# Patient Record
Sex: MALE | Race: BLACK OR AFRICAN AMERICAN | NOT HISPANIC OR LATINO | Employment: UNEMPLOYED | URBAN - METROPOLITAN AREA
[De-identification: names, ages, dates, MRNs, and addresses within clinical notes are randomized per-mention and may not be internally consistent; named-entity substitution may affect disease eponyms.]

---

## 2018-11-08 PROBLEM — R53.1 ACUTE LEFT-SIDED WEAKNESS: Status: ACTIVE | Noted: 2018-11-08

## 2018-11-09 ENCOUNTER — HOSPITAL ENCOUNTER (INPATIENT)
Facility: HOSPITAL | Age: 43
LOS: 13 days | Discharge: NURSING, REHAB OR LONG-TERM CARE | DRG: 023 | End: 2018-11-22
Attending: EMERGENCY MEDICINE | Admitting: RADIOLOGY
Payer: MEDICAID

## 2018-11-09 ENCOUNTER — RESEARCH ENCOUNTER (OUTPATIENT)
Dept: NEUROLOGY | Facility: CLINIC | Age: 43
End: 2018-11-09

## 2018-11-09 DIAGNOSIS — I63.9 STROKE: ICD-10-CM

## 2018-11-09 DIAGNOSIS — Z92.82 S/P ADMN TPA IN DIFF FAC W/N LAST 24 HR BEF ADM TO CRNT FAC: ICD-10-CM

## 2018-11-09 DIAGNOSIS — Z72.0 TOBACCO ABUSE: ICD-10-CM

## 2018-11-09 DIAGNOSIS — I10 ESSENTIAL HYPERTENSION: ICD-10-CM

## 2018-11-09 DIAGNOSIS — I63.411 EMBOLIC STROKE INVOLVING RIGHT MIDDLE CEREBRAL ARTERY: Primary | ICD-10-CM

## 2018-11-09 DIAGNOSIS — R53.1 ACUTE LEFT-SIDED WEAKNESS: ICD-10-CM
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
## 2018-11-09 DIAGNOSIS — G93.6 CYTOTOXIC CEREBRAL EDEMA: ICD-10-CM

## 2018-11-09 DIAGNOSIS — I63.9 CVA (CEREBRAL VASCULAR ACCIDENT): ICD-10-CM

## 2018-11-09 PROBLEM — F17.200 SMOKER: Status: ACTIVE | Noted: 2018-11-09

## 2018-11-09 PROBLEM — I63.412 EMBOLIC STROKE INVOLVING LEFT MIDDLE CEREBRAL ARTERY: Status: ACTIVE | Noted: 2018-11-09

## 2018-11-09 LAB
ALBUMIN SERPL BCP-MCNC: 3.8 G/DL
ALP SERPL-CCNC: 88 U/L
ALT SERPL W/O P-5'-P-CCNC: 20 U/L
AMPHET+METHAMPHET UR QL: NORMAL
ANION GAP SERPL CALC-SCNC: 10 MMOL/L
ANION GAP SERPL CALC-SCNC: 10 MMOL/L
AST SERPL-CCNC: 24 U/L
BARBITURATES UR QL SCN>200 NG/ML: NEGATIVE
BASOPHILS # BLD AUTO: 0.03 K/UL
BASOPHILS NFR BLD: 0.3 %
BENZODIAZ UR QL SCN>200 NG/ML: NORMAL
BILIRUB SERPL-MCNC: 0.8 MG/DL
BUN SERPL-MCNC: 18 MG/DL
BUN SERPL-MCNC: 18 MG/DL
BZE UR QL SCN: NEGATIVE
CALCIUM SERPL-MCNC: 9.2 MG/DL
CALCIUM SERPL-MCNC: 9.4 MG/DL
CANNABINOIDS UR QL SCN: NORMAL
CHLORIDE SERPL-SCNC: 103 MMOL/L
CHLORIDE SERPL-SCNC: 108 MMOL/L
CHOLEST SERPL-MCNC: 201 MG/DL
CHOLEST/HDLC SERPL: 5.7 {RATIO}
CO2 SERPL-SCNC: 23 MMOL/L
CO2 SERPL-SCNC: 24 MMOL/L
CREAT SERPL-MCNC: 0.9 MG/DL
CREAT SERPL-MCNC: 1.2 MG/DL
CREAT UR-MCNC: 215 MG/DL
DIFFERENTIAL METHOD: ABNORMAL
EOSINOPHIL # BLD AUTO: 0 K/UL
EOSINOPHIL NFR BLD: 0.1 %
ERYTHROCYTE [DISTWIDTH] IN BLOOD BY AUTOMATED COUNT: 17.4 %
ERYTHROCYTE [SEDIMENTATION RATE] IN BLOOD BY WESTERGREN METHOD: 30 MM/HR
EST. GFR  (AFRICAN AMERICAN): >60 ML/MIN/1.73 M^2
EST. GFR  (AFRICAN AMERICAN): >60 ML/MIN/1.73 M^2
EST. GFR  (NON AFRICAN AMERICAN): >60 ML/MIN/1.73 M^2
EST. GFR  (NON AFRICAN AMERICAN): >60 ML/MIN/1.73 M^2
ESTIMATED AVG GLUCOSE: 123 MG/DL
ETHANOL UR-MCNC: <10 MG/DL
GLUCOSE SERPL-MCNC: 118 MG/DL
GLUCOSE SERPL-MCNC: 99 MG/DL
HBA1C MFR BLD HPLC: 5.9 %
HCT VFR BLD AUTO: 44.8 %
HDLC SERPL-MCNC: 35 MG/DL
HDLC SERPL: 17.4 %
HGB BLD-MCNC: 14.2 G/DL
IMM GRANULOCYTES # BLD AUTO: 0.02 K/UL
IMM GRANULOCYTES NFR BLD AUTO: 0.2 %
INR PPP: 1
LDLC SERPL CALC-MCNC: 141.4 MG/DL
LYMPHOCYTES # BLD AUTO: 2 K/UL
LYMPHOCYTES NFR BLD: 22.5 %
MCH RBC QN AUTO: 25.4 PG
MCHC RBC AUTO-ENTMCNC: 31.7 G/DL
MCV RBC AUTO: 80 FL
METHADONE UR QL SCN>300 NG/ML: NEGATIVE
MONOCYTES # BLD AUTO: 0.4 K/UL
MONOCYTES NFR BLD: 5 %
NEUTROPHILS # BLD AUTO: 6.4 K/UL
NEUTROPHILS NFR BLD: 71.9 %
NONHDLC SERPL-MCNC: 166 MG/DL
NRBC BLD-RTO: 0 /100 WBC
OPIATES UR QL SCN: NORMAL
PCP UR QL SCN>25 NG/ML: NEGATIVE
PLATELET # BLD AUTO: 242 K/UL
PMV BLD AUTO: 13 FL
POCT GLUCOSE: 113 MG/DL (ref 70–110)
POCT GLUCOSE: 118 MG/DL (ref 70–110)
POCT GLUCOSE: 120 MG/DL (ref 70–110)
POCT GLUCOSE: 130 MG/DL (ref 70–110)
POTASSIUM SERPL-SCNC: 3.5 MMOL/L
POTASSIUM SERPL-SCNC: 4.7 MMOL/L
PROT SERPL-MCNC: 7.5 G/DL
PROTHROMBIN TIME: 10.6 SEC
RBC # BLD AUTO: 5.6 M/UL
SODIUM SERPL-SCNC: 137 MMOL/L
SODIUM SERPL-SCNC: 141 MMOL/L
TOXICOLOGY INFORMATION: NORMAL
TRIGL SERPL-MCNC: 123 MG/DL
TSH SERPL DL<=0.005 MIU/L-ACNC: 1.56 UIU/ML
WBC # BLD AUTO: 8.88 K/UL

## 2018-11-09 PROCEDURE — B3161ZZ FLUOROSCOPY OF RIGHT INTERNAL CAROTID ARTERY USING LOW OSMOLAR CONTRAST: ICD-10-PCS | Performed by: RADIOLOGY

## 2018-11-09 PROCEDURE — G8996 SWALLOW CURRENT STATUS: HCPCS | Mod: CK

## 2018-11-09 PROCEDURE — 96376 TX/PRO/DX INJ SAME DRUG ADON: CPT

## 2018-11-09 PROCEDURE — 93010 ELECTROCARDIOGRAM REPORT: CPT | Mod: ,,, | Performed by: INTERNAL MEDICINE

## 2018-11-09 PROCEDURE — 82962 GLUCOSE BLOOD TEST: CPT

## 2018-11-09 PROCEDURE — G8997 SWALLOW GOAL STATUS: HCPCS | Mod: CI

## 2018-11-09 PROCEDURE — 03CG3ZZ EXTIRPATION OF MATTER FROM INTRACRANIAL ARTERY, PERCUTANEOUS APPROACH: ICD-10-PCS | Performed by: RADIOLOGY

## 2018-11-09 PROCEDURE — 99233 SBSQ HOSP IP/OBS HIGH 50: CPT | Mod: ,,, | Performed by: PSYCHIATRY & NEUROLOGY

## 2018-11-09 PROCEDURE — 85610 PROTHROMBIN TIME: CPT

## 2018-11-09 PROCEDURE — 94761 N-INVAS EAR/PLS OXIMETRY MLT: CPT

## 2018-11-09 PROCEDURE — 96374 THER/PROPH/DIAG INJ IV PUSH: CPT

## 2018-11-09 PROCEDURE — 99291 CRITICAL CARE FIRST HOUR: CPT | Mod: ,,, | Performed by: PHYSICIAN ASSISTANT

## 2018-11-09 PROCEDURE — 80053 COMPREHEN METABOLIC PANEL: CPT

## 2018-11-09 PROCEDURE — 25500020 PHARM REV CODE 255: Performed by: EMERGENCY MEDICINE

## 2018-11-09 PROCEDURE — 63600175 PHARM REV CODE 636 W HCPCS: Performed by: STUDENT IN AN ORGANIZED HEALTH CARE EDUCATION/TRAINING PROGRAM

## 2018-11-09 PROCEDURE — 80048 BASIC METABOLIC PNL TOTAL CA: CPT

## 2018-11-09 PROCEDURE — 63600175 PHARM REV CODE 636 W HCPCS: Performed by: RADIOLOGY

## 2018-11-09 PROCEDURE — 83036 HEMOGLOBIN GLYCOSYLATED A1C: CPT

## 2018-11-09 PROCEDURE — 92610 EVALUATE SWALLOWING FUNCTION: CPT

## 2018-11-09 PROCEDURE — 25000003 PHARM REV CODE 250: Performed by: STUDENT IN AN ORGANIZED HEALTH CARE EDUCATION/TRAINING PROGRAM

## 2018-11-09 PROCEDURE — 80061 LIPID PANEL: CPT

## 2018-11-09 PROCEDURE — 80307 DRUG TEST PRSMV CHEM ANLYZR: CPT

## 2018-11-09 PROCEDURE — 85652 RBC SED RATE AUTOMATED: CPT

## 2018-11-09 PROCEDURE — 25000003 PHARM REV CODE 250: Performed by: NURSE PRACTITIONER

## 2018-11-09 PROCEDURE — 93005 ELECTROCARDIOGRAM TRACING: CPT

## 2018-11-09 PROCEDURE — 99285 EMERGENCY DEPT VISIT HI MDM: CPT | Mod: 25

## 2018-11-09 PROCEDURE — 99231 SBSQ HOSP IP/OBS SF/LOW 25: CPT | Mod: ,,, | Performed by: SURGERY

## 2018-11-09 PROCEDURE — 85025 COMPLETE CBC W/AUTO DIFF WBC: CPT

## 2018-11-09 PROCEDURE — 20000000 HC ICU ROOM

## 2018-11-09 PROCEDURE — B3131ZZ FLUOROSCOPY OF RIGHT COMMON CAROTID ARTERY USING LOW OSMOLAR CONTRAST: ICD-10-PCS | Performed by: RADIOLOGY

## 2018-11-09 PROCEDURE — 84443 ASSAY THYROID STIM HORMONE: CPT

## 2018-11-09 PROCEDURE — 36620 INSERTION CATHETER ARTERY: CPT | Mod: ,,, | Performed by: PSYCHIATRY & NEUROLOGY

## 2018-11-09 PROCEDURE — 86038 ANTINUCLEAR ANTIBODIES: CPT

## 2018-11-09 PROCEDURE — 99223 1ST HOSP IP/OBS HIGH 75: CPT | Mod: 25,,, | Performed by: PSYCHIATRY & NEUROLOGY

## 2018-11-09 PROCEDURE — 25000003 PHARM REV CODE 250

## 2018-11-09 RX ORDER — GLUCAGON 1 MG
1 KIT INJECTION
Status: DISCONTINUED | OUTPATIENT
Start: 2018-11-09 | End: 2018-11-22 | Stop reason: HOSPADM

## 2018-11-09 RX ORDER — LABETALOL HYDROCHLORIDE 5 MG/ML
10 INJECTION, SOLUTION INTRAVENOUS ONCE
Status: DISCONTINUED | OUTPATIENT
Start: 2018-11-09 | End: 2018-11-09

## 2018-11-09 RX ORDER — SODIUM,POTASSIUM PHOSPHATES 280-250MG
2 POWDER IN PACKET (EA) ORAL
Status: DISCONTINUED | OUTPATIENT
Start: 2018-11-09 | End: 2018-11-16

## 2018-11-09 RX ORDER — SODIUM CHLORIDE 0.9 % (FLUSH) 0.9 %
5 SYRINGE (ML) INJECTION
Status: DISCONTINUED | OUTPATIENT
Start: 2018-11-09 | End: 2018-11-22 | Stop reason: HOSPADM

## 2018-11-09 RX ORDER — MIDAZOLAM HYDROCHLORIDE 1 MG/ML
INJECTION INTRAMUSCULAR; INTRAVENOUS CODE/TRAUMA/SEDATION MEDICATION
Status: COMPLETED | OUTPATIENT
Start: 2018-11-09 | End: 2018-11-09

## 2018-11-09 RX ORDER — AMLODIPINE BESYLATE 10 MG/1
10 TABLET ORAL DAILY
Status: DISCONTINUED | OUTPATIENT
Start: 2018-11-09 | End: 2018-11-22 | Stop reason: HOSPADM

## 2018-11-09 RX ORDER — POTASSIUM CHLORIDE 20 MEQ/15ML
40 SOLUTION ORAL
Status: DISCONTINUED | OUTPATIENT
Start: 2018-11-09 | End: 2018-11-16

## 2018-11-09 RX ORDER — ATORVASTATIN CALCIUM 20 MG/1
40 TABLET, FILM COATED ORAL DAILY
Status: DISCONTINUED | OUTPATIENT
Start: 2018-11-09 | End: 2018-11-16

## 2018-11-09 RX ORDER — MIDAZOLAM HYDROCHLORIDE 1 MG/ML
INJECTION INTRAMUSCULAR; INTRAVENOUS
Status: DISPENSED
Start: 2018-11-09 | End: 2018-11-10

## 2018-11-09 RX ORDER — LANOLIN ALCOHOL/MO/W.PET/CERES
800 CREAM (GRAM) TOPICAL
Status: DISCONTINUED | OUTPATIENT
Start: 2018-11-09 | End: 2018-11-16

## 2018-11-09 RX ORDER — LIDOCAINE HCL/PF 100 MG/5ML
SYRINGE (ML) INTRAVENOUS
Status: DISCONTINUED
Start: 2018-11-09 | End: 2018-11-09 | Stop reason: WASHOUT

## 2018-11-09 RX ORDER — NICARDIPINE HYDROCHLORIDE 0.2 MG/ML
1 INJECTION INTRAVENOUS CONTINUOUS
Status: DISCONTINUED | OUTPATIENT
Start: 2018-11-09 | End: 2018-11-10

## 2018-11-09 RX ORDER — LIDOCAINE HYDROCHLORIDE 10 MG/ML
1 INJECTION, SOLUTION EPIDURAL; INFILTRATION; INTRACAUDAL; PERINEURAL
Status: DISCONTINUED | OUTPATIENT
Start: 2018-11-09 | End: 2018-11-12

## 2018-11-09 RX ORDER — LIDOCAINE HYDROCHLORIDE 10 MG/ML
INJECTION INFILTRATION; PERINEURAL
Status: COMPLETED
Start: 2018-11-09 | End: 2018-11-09

## 2018-11-09 RX ORDER — HYDRALAZINE HYDROCHLORIDE 20 MG/ML
10 INJECTION INTRAMUSCULAR; INTRAVENOUS EVERY 4 HOURS PRN
Status: DISCONTINUED | OUTPATIENT
Start: 2018-11-09 | End: 2018-11-22 | Stop reason: HOSPADM

## 2018-11-09 RX ORDER — HYDRALAZINE HYDROCHLORIDE 25 MG/1
25 TABLET, FILM COATED ORAL ONCE
Status: COMPLETED | OUTPATIENT
Start: 2018-11-09 | End: 2018-11-09

## 2018-11-09 RX ORDER — INSULIN ASPART 100 [IU]/ML
1-10 INJECTION, SOLUTION INTRAVENOUS; SUBCUTANEOUS EVERY 6 HOURS PRN
Status: DISCONTINUED | OUTPATIENT
Start: 2018-11-09 | End: 2018-11-12

## 2018-11-09 RX ORDER — AMLODIPINE BESYLATE 10 MG/1
10 TABLET ORAL DAILY
COMMUNITY

## 2018-11-09 RX ORDER — HYDRALAZINE HYDROCHLORIDE 50 MG/1
50 TABLET, FILM COATED ORAL EVERY 8 HOURS
Status: DISCONTINUED | OUTPATIENT
Start: 2018-11-09 | End: 2018-11-13

## 2018-11-09 RX ORDER — POTASSIUM CHLORIDE 20 MEQ/15ML
60 SOLUTION ORAL
Status: DISCONTINUED | OUTPATIENT
Start: 2018-11-09 | End: 2018-11-16

## 2018-11-09 RX ORDER — HYDRALAZINE HYDROCHLORIDE 25 MG/1
25 TABLET, FILM COATED ORAL EVERY 8 HOURS
Status: DISCONTINUED | OUTPATIENT
Start: 2018-11-09 | End: 2018-11-09

## 2018-11-09 RX ORDER — LABETALOL HYDROCHLORIDE 5 MG/ML
INJECTION, SOLUTION INTRAVENOUS
Status: COMPLETED
Start: 2018-11-09 | End: 2018-11-09

## 2018-11-09 RX ADMIN — NICARDIPINE HYDROCHLORIDE 10 MG/HR: 0.2 INJECTION, SOLUTION INTRAVENOUS at 02:11

## 2018-11-09 RX ADMIN — HYDRALAZINE HYDROCHLORIDE 25 MG: 25 TABLET, FILM COATED ORAL at 11:11

## 2018-11-09 RX ADMIN — NICARDIPINE HYDROCHLORIDE 15 MG/HR: 0.2 INJECTION, SOLUTION INTRAVENOUS at 09:11

## 2018-11-09 RX ADMIN — HYDRALAZINE HYDROCHLORIDE 25 MG: 25 TABLET, FILM COATED ORAL at 05:11

## 2018-11-09 RX ADMIN — LABETALOL HYDROCHLORIDE 10 MG: 5 INJECTION INTRAVENOUS at 07:11

## 2018-11-09 RX ADMIN — MIDAZOLAM HYDROCHLORIDE 1 MG: 1 INJECTION, SOLUTION INTRAMUSCULAR; INTRAVENOUS at 05:11

## 2018-11-09 RX ADMIN — HYDRALAZINE HYDROCHLORIDE 10 MG: 20 INJECTION INTRAMUSCULAR; INTRAVENOUS at 09:11

## 2018-11-09 RX ADMIN — ATORVASTATIN CALCIUM 40 MG: 20 TABLET, FILM COATED ORAL at 11:11

## 2018-11-09 RX ADMIN — NICARDIPINE HYDROCHLORIDE 10 MG/HR: 0.2 INJECTION, SOLUTION INTRAVENOUS at 06:11

## 2018-11-09 RX ADMIN — AMLODIPINE BESYLATE 10 MG: 10 TABLET ORAL at 11:11

## 2018-11-09 RX ADMIN — MIDAZOLAM HYDROCHLORIDE 0.5 MG: 1 INJECTION, SOLUTION INTRAMUSCULAR; INTRAVENOUS at 06:11

## 2018-11-09 RX ADMIN — IOHEXOL 100 ML: 350 INJECTION, SOLUTION INTRAVENOUS at 03:11

## 2018-11-09 RX ADMIN — IOHEXOL 45 ML: 300 INJECTION, SOLUTION INTRAVENOUS at 06:11

## 2018-11-09 RX ADMIN — LIDOCAINE HYDROCHLORIDE: 10 INJECTION, SOLUTION INFILTRATION; PERINEURAL at 10:11

## 2018-11-09 NOTE — PT/OT/SLP EVAL
"Speech Language Pathology Evaluation  Bedside Swallow    Patient Name:  Tyree Pablo   MRN:  [id number]  Admitting Diagnosis: <principal problem not specified>    Recommendations:                 General Recommendations:  Speech language evaluation  Diet recommendations:  Puree, Thin   Aspiration Precautions: Check for pocketing/oral residue, Meds crushed in puree, No straws and Standard aspiration precautions   General Precautions: Standard, aspiration  Communication strategies:  none    History:     Past Medical History:   Diagnosis Date    Acute left-sided weakness 11/8/2018    Hypertension        History reviewed. No pertinent surgical history.    Social History: Patient lives with family.        Prior diet: Regular        Subjective     "That tastes good' per pt  Patient goals did not state    Pain/Comfort:  · Pain Rating 1: 0/10  · Pain Rating Post-Intervention 1: 0/10    Objective:     Oral Musculature Evaluation  · Oral Musculature: facial asymmetry present, left weakness  · Dentition: present and adequate  · Mucosal Quality: good  · Oral Labial Strength and Mobility: impaired retraction, impaired seal  · Lingual Strength and Mobility: impaired strength  · Velar Elevation: WFL  · Buccal Strength and Mobility: impaired  · Volitional Cough: strong  · Volitional Swallow: delayed  · Voice Prior to PO Intake: wfl    Bedside Swallow Eval:   Consistencies Assessed:  · Thin liquids 2 teaspoons then 6 sips via cup  · Puree 2 teaspoons  · Solids 1/2 cracker     Oral Phase:   · Anterior loss  · Impaired rotational chew  · Oral residue    Pharyngeal Phase:   · no overt clinical signs/symptoms of aspiration  · no overt clinical signs/symptoms of pharyngeal dysphagia    Compensatory Strategies  · None    Treatment:     Assessment:     Tyree Pablo is a 43 y.o. male with an SLP diagnosis of .  He presents with oral dysphagia  Goals:   Multidisciplinary Problems     SLP Goals        Problem: SLP Goal    Goal Priority " Disciplines Outcome   SLP Goal     SLP    Description:  Goals due 11/16  1.  Tolerate trials of [facility] soft diet with thin liquids with no s/s of aspiration  2.  Participate in speech language evaluation                    Plan:     · Patient to be seen:  5 x/week   · Plan of Care expires:  12/07/18  · Plan of Care reviewed with:  patient   · SLP Follow-Up:  Yes       Discharge recommendations:  rehabilitation facility       Time Tracking:     SLP Treatment Date:   11/09/18  Speech Start Time:  1135  Speech Stop Time:  1145     Speech Total Time (min):  10 min    Billable Minutes: Eval Swallow and Oral Function 10    Estefanía Yanez MA, CCC-SLP  11/09/2018

## 2018-11-09 NOTE — PROCEDURES
Radiology Post-Procedure Note    Pre Op Diagnosis: right MCA stroke    Post Op Diagnosis: same, S/P MCA thrombectomy    Procedure: Cerebral angiogram    Procedure performed by: Marek Solis MD    Written Informed Consent Obtained: Yes    Specimen Removed: YES thrombus from the right M1 segment    Estimated Blood Loss: less than 50     Procedure report:     A 8F sheath was placed into the right femoral artery and a 5F Desmond catheter was advanced into the aortic arch.  The right common and internal carotid arteries were subselected and angiography of the brain was performed.  Using suction aspiration, a thrombus was removed from the right MCA with TICI 2B reperfusion.      Time of groin access was 6:06am.]  !st pass TICI 2B was at 6:13am.      A right femoral artery angiogram was performed, the sheath removed and hemostasis achieved using Angioseal.  No hematoma was present at the time of hemostasis.    The patient tolerated the procedure well.     Marek Solis MD  Attending Radiologist  Interventional Neuroradiology

## 2018-11-09 NOTE — ASSESSMENT & PLAN NOTE
Q1H Neuro checks   Will follow repeat imaging for signs of edema   Consider Neurosurgery Consult for hemicrani

## 2018-11-09 NOTE — H&P
Inpatient Radiology Pre-procedure Note    History of Present Illness:  Tyree Pablo is a 43 y.o. male with right M1 occlusion who presents for cerebral angiogram with possible thrombectomy.    Admission H&P reviewed.  Past Medical History:   Diagnosis Date    Acute left-sided weakness 11/8/2018    Hypertension      History reviewed. No pertinent surgical history.    Review of Systems:   As documented in primary team H&P    Home Meds:   Prior to Admission medications    Medication Sig Start Date End Date Taking? Authorizing Provider   amLODIPine (NORVASC) 10 MG tablet Take 10 mg by mouth once daily.   Yes Historical Provider, MD     Scheduled Meds:   Continuous Infusions:   PRN Meds:  Anticoagulants/Antiplatelets: tpa    Allergies: Review of patient's allergies indicates:  No Known Allergies  Sedation Hx: have not been any systemic reactions    Labs:  Recent Labs   Lab 11/09/18 0353   INR 1.0       Recent Labs   Lab 11/09/18 0353   WBC 8.88   HGB 14.2   HCT 44.8   MCV 80*         Recent Labs   Lab 11/09/18 0353   GLU 99      K 4.7      CO2 24   BUN 18   CREATININE 1.2   CALCIUM 9.2   ALT 20   AST 24   ALBUMIN 3.8   BILITOT 0.8         Vitals:  Temp: 98.2 °F (36.8 °C) (11/09/18 0312)  Pulse: 84 (11/09/18 0534)  Resp: 18 (11/09/18 0504)  BP: 119/80 (11/09/18 0518)  SpO2: 100 % (11/09/18 0534)     Physical Exam:  ASA: 3  Mallampati: 2    Mental Status: alert   HEENT: normocephalic, atraumatic  Chest: unlabored breathing  Heart: regular heart rate  Abdomen: nondistended  Extremity: left hemiplegia    Plan: cerebral angiogram with thrombectomy  Sedation Plan: Moderate    Ashley Kilgore MD  Resident  Department of Radiology  Pager: [phone]
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
## 2018-11-09 NOTE — ED TRIAGE NOTES
Pt presents to ED as transfer from [facility] for CVA. Pt presented with left sided upper and lower extremity paralysis and left facial droop which started at 2030 on 11/8/18. Pt reports having similar symptoms a few days ago but reports that symptoms resolved after 30 minutes. Pt currently AAOx4, following commands, but still exhibits left sided paralysis and left facial droop. Pt's only known history is hypertension.       TPA Administration   Administered by [facility]   Bolus dose of 9mg given at 2234.   Infusion of 81mg started at 2235.   Infusion completed at 2336.   Last Seen Normal: 11/8/18 at 2030   Symptoms Onset 11/8/18 at 2030

## 2018-11-09 NOTE — ASSESSMENT & PLAN NOTE
-Area of cytotoxic cerebral edema identified when reviewing brain imaging in the territory of the right middle cerebral artery. There is no mass effect associated with it. We will continue to monitor the patients clinical exam for any worsening of symptoms which may indicate expansion of the stroke or the area of the edema resulting in the clinical change. The pattern is suggestive of embolic etiology.

## 2018-11-09 NOTE — PROCEDURES
"Tyree Pablo is a 43 y.o. male patient.    Temp: 97.4 °F (36.3 °C) (18 1105)  Pulse: 87 (18 1320)  Resp: 18 (18 1320)  BP: 139/65 (18 1320)  SpO2: 98 % (18 1320)       Arterial Line  Date/Time: 2018 3:59 PM  Location procedure was performed: [facility] NEURO CRITICAL CARE  Performed by: Brenda Adler MD  Authorized by: Brenda Adler MD   Assisting provider: Jeremy Parsons MD  Consent Done: Yes  Consent: Verbal consent obtained. Written consent obtained.  Risks and benefits: risks, benefits and alternatives were discussed  Consent given by: spouse (Verbal from patient, spouse signed due to patient's mechanical limitations in upper extremities)  Patient understanding: patient states understanding of the procedure being performed  Patient consent: the patient's understanding of the procedure matches consent given  Procedure consent: procedure consent matches procedure scheduled  Relevant documents: relevant documents present and verified  Site marked: the operative site was marked  Patient identity confirmed: , name and verbally with patient  Time out: Immediately prior to procedure a "time out" was called to verify the correct patient, procedure, equipment, support staff and site/side marked as required.  Preparation: Patient was prepped and draped in the usual sterile fashion.  Indications: hemodynamic monitoring  Location: right radial  Anesthesia: local infiltration    Anesthesia:  Local Anesthetic: lidocaine 1% without epinephrine  Anesthetic total: 1 mL  Patient sedated: no  Description of findings: Bright red pulsatile blood   Needle gauge: 18  Number of attempts: 1  Complications: No  Estimated blood loss (mL): 3  Specimens: No  Implants: No  Post-procedure: dressing applied  Post-procedure CMS: normal        Brenda Adler  2018    "

## 2018-11-09 NOTE — PROGRESS NOTES
Patient arrived to [facility] from OSH  by EMS    Type of stroke/diagnosis:  R MCA    TPA start and end time (if applicable) start: 2234 end: 2336    Thrombectomy start and end time (if applicable) start: 0606 end: 0622     Current symptoms: L sided weakness, dysarthria    Skin assessment done: Y  Wounds noted: extravasation in L AC; Swelling in R shin    NCC notified: Wong Bowden
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
## 2018-11-09 NOTE — SUBJECTIVE & OBJECTIVE
Past Medical History:   Diagnosis Date    Acute left-sided weakness 11/8/2018    Hypertension      History reviewed. No pertinent surgical history.   No current facility-administered medications on file prior to encounter.      Current Outpatient Medications on File Prior to Encounter   Medication Sig Dispense Refill    amLODIPine (NORVASC) 10 MG tablet Take 10 mg by mouth once daily.        Allergies: Patient has no known allergies.    Family History   Problem Relation Age of Onset    Diabetes Mother     Stroke Father      Social History     Tobacco Use    Smoking status: Current Every Day Smoker     Packs/day: 1.00     Years: 5.00     Pack years: 5.00     Types: Cigarettes    Smokeless tobacco: Never Used   Substance Use Topics    Alcohol use: Yes     Comment: Occasionally    Drug use: Yes     Types: Marijuana     Review of Systems   Constitutional: Negative for chills.   Eyes: Positive for photophobia. Negative for visual disturbance.   Respiratory: Negative for apnea and shortness of breath.    Cardiovascular: Negative for chest pain and leg swelling.   Gastrointestinal: Negative for abdominal distention, abdominal pain, nausea and vomiting.   Endocrine: Negative for cold intolerance.   Neurological: Negative for dizziness, seizures and headaches.     Objective:     Vitals:    Temp: 98.8 °F (37.1 °C)  Pulse: 84  BP: (!) 168/105  MAP (mmHg): 95  Resp: 17  SpO2: 100 %  O2 Device (Oxygen Therapy): nasal cannula    Temp  Min: 98.2 °F (36.8 °C)  Max: 98.8 °F (37.1 °C)  Pulse  Min: 80  Max: 117  BP  Min: 119/80  Max: 168/105  MAP (mmHg)  Min: 95  Max: 95  Resp  Min: 16  Max: 20  SpO2  Min: 97 %  Max: 100 %    No intake/output data recorded.           Physical Exam   Constitutional: He appears well-developed.   Neck: Normal range of motion.   Cardiovascular: Normal rate, regular rhythm and normal heart sounds.   Pulmonary/Chest: Effort normal and breath sounds normal.   Abdominal: Soft. Bowel sounds are  normal. He exhibits no distension and no mass. There is no rebound and no guarding.   Neurological:   E4 V5 M 6  GCS 15  NIH 15  Awaken to voice   Right Gaze Deviation, PERRLA  Left facial weakness, dysarthria    Left UE/ LE flaccid   Decreased sensation LUE/LLE      Psychiatric: He has a normal mood and affect. His speech is slurred. He is slowed.     I have perersonally reviewed pertinent medications, lines/drains/airways, imaging, cardiology results, laboratory results,

## 2018-11-09 NOTE — SUBJECTIVE & OBJECTIVE
Interval History: s/p TPA and angiogram with thrombectomy of right M1 branch of MCA. He continues to have no sensation or motor function of LUE and LLE, with left hemineglect.   States he is in no pain and have no current complaints.     Medications:  Continuous Infusions:   nicardipine 10 mg/hr (11/09/18 1627)     Scheduled Meds:   amLODIPine  10 mg Oral Daily    atorvastatin  40 mg Oral Daily    hydrALAZINE  25 mg Oral Once    hydrALAZINE  50 mg Oral Q8H     PRN Meds:dextrose 50%, glucagon (human recombinant), hydrALAZINE, insulin aspart U-100, lidocaine (PF) 10 mg/ml (1%), magnesium oxide, magnesium oxide, potassium chloride 10%, potassium chloride 10%, potassium chloride 10%, potassium, sodium phosphates, potassium, sodium phosphates, potassium, sodium phosphates, sodium chloride 0.9%     Review of patient's allergies indicates:  No Known Allergies  Objective:     Vital Signs (Most Recent):  Temp: 97.8 °F (36.6 °C) (11/09/18 1505)  Pulse: 91 (11/09/18 1620)  Resp: 18 (11/09/18 1620)  BP: 127/63 (11/09/18 1620)  SpO2: 100 % (11/09/18 1620) Vital Signs (24h Range):  Temp:  [97.4 °F (36.3 °C)-98.8 °F (37.1 °C)] 97.8 °F (36.6 °C)  Pulse:  [] 91  Resp:  [12-28] 18  SpO2:  [94 %-100 %] 100 %  BP: (110-186)/() 127/63  Arterial Line BP: (117-153)/(63-69) 127/63        There is no height or weight on file to calculate BMI.    Intake/Output - Last 3 Shifts       11/07 0700 - 11/08 0659 11/08 0700 - 11/09 0659 11/09 0700 - 11/10 0659    I.V.   520.8    Total Intake   520.8    Net   +520.8                 Physical Exam   Constitutional: He is oriented to person, place, and time. He appears well-developed and well-nourished. No distress.   HENT:   Head: Normocephalic and atraumatic.   Eyes: Pupils are equal, round, and reactive to light.   Left hemineglect   Neck: Normal range of motion. Neck supple.   Cardiovascular: Normal rate and regular rhythm.   Pulmonary/Chest: Effort normal. No respiratory distress.    Abdominal: Soft. He exhibits no distension and no mass. There is no tenderness. No hernia.   Musculoskeletal: Normal range of motion. He exhibits no edema, tenderness or deformity.   5/5 strength RUE and RLE. Normal sensation. Soft tissue swelling on anterior right calf which patient reports as chronic from previous leg surgery.   0/5 strength LLE and LUE. No reported sensation.     No bruising or skin changes. No obvious deformities   Neurological: He is alert and oriented to person, place, and time.   Skin: Skin is warm and dry. He is not diaphoretic.       Significant Labs:  CBC:   Recent Labs   Lab 11/09/18 0353   WBC 8.88   RBC 5.60   HGB 14.2   HCT 44.8      MCV 80*   MCH 25.4*   MCHC 31.7*     BMP:   Recent Labs   Lab 11/09/18  1545   *      K 3.5      CO2 23   BUN 18   CREATININE 0.9   CALCIUM 9.4     CMP:   Recent Labs   Lab 11/09/18 0353 11/09/18  1545   GLU 99 118*   CALCIUM 9.2 9.4   ALBUMIN 3.8  --    PROT 7.5  --     141   K 4.7 3.5   CO2 24 23    108   BUN 18 18   CREATININE 1.2 0.9   ALKPHOS 88  --    ALT 20  --    AST 24  --    BILITOT 0.8  --      LFTs:   Recent Labs   Lab 11/09/18 0353   ALT 20   AST 24   ALKPHOS 88   BILITOT 0.8   PROT 7.5   ALBUMIN 3.8       Significant Diagnostics:  Review imaging from the past 24 hours

## 2018-11-09 NOTE — PLAN OF CARE
Problem: SLP Goal  Goal: SLP Goal  Pureed diet with thin liquids recommended.  Crush meds in puree bolus.    Estefanía Yanez MA/[facility]SLP  Speech Language Pathologist  Pager [phone]  11/9/2018

## 2018-11-09 NOTE — ASSESSMENT & PLAN NOTE
S/p tPA given at 2234 - 1630 MRI order   CTA head reveals distal right M1 occlusion   Consult Vascular Neurology  IR following plans for Thrombectomy   BP <140   Q1H Neuro Monitoring   Repeat Imaging to follow   PT/OT/SLP

## 2018-11-09 NOTE — SUBJECTIVE & OBJECTIVE
Past Medical History:   Diagnosis Date    Acute left-sided weakness 11/8/2018    Hypertension      History reviewed. No pertinent surgical history.  Family History   Problem Relation Age of Onset    Diabetes Mother     Stroke Father      Social History     Tobacco Use    Smoking status: Current Every Day Smoker     Packs/day: 1.00     Years: 5.00     Pack years: 5.00     Types: Cigarettes    Smokeless tobacco: Never Used   Substance Use Topics    Alcohol use: Yes     Comment: Occasionally    Drug use: Yes     Types: Marijuana     Review of patient's allergies indicates:  No Known Allergies    Medications: I have reviewed the current medication administration record.    No current facility-administered medications for this encounter.     Current Outpatient Medications:     amLODIPine (NORVASC) 10 MG tablet, Take 10 mg by mouth once daily., Disp: , Rfl:       Review of Systems   Constitutional: Negative for chills and fever.   HENT: Negative for drooling and trouble swallowing.    Eyes: Positive for visual disturbance. Negative for discharge and redness.   Respiratory: Negative for cough and shortness of breath.    Cardiovascular: Negative for chest pain and leg swelling.   Gastrointestinal: Negative for abdominal pain, diarrhea, nausea and vomiting.   Endocrine: Negative for cold intolerance and heat intolerance.   Genitourinary: Negative for hematuria.   Musculoskeletal: Negative for neck pain and neck stiffness.   Skin: Negative for rash and wound.   Allergic/Immunologic: Negative for environmental allergies and food allergies.   Neurological: Positive for dizziness, facial asymmetry, speech difficulty, weakness, light-headedness, numbness and headaches.   Psychiatric/Behavioral: Negative for agitation and confusion.     Objective:     Vital Signs (Most Recent):  Temp: 98.2 °F (36.8 °C) (11/09/18 0312)  Pulse: 85 (11/09/18 0404)  Resp: 16 (11/09/18 0404)  BP: (!) 156/93 (11/09/18 0404)  SpO2: 99 %  (11/09/18 0404)    Vital Signs Range (Last 24H):  Temp:  [98.2 °F (36.8 °C)]   Pulse:  []   Resp:  [16-20]   BP: (151-160)/()   SpO2:  [97 %-100 %]     Physical Exam   Constitutional: He is oriented to person, place, and time. He appears well-developed and well-nourished. He appears lethargic. No distress.   HENT:   Head: Normocephalic and atraumatic.   Right Ear: External ear normal.   Left Ear: External ear normal.   Nose: Nose normal.   Mouth/Throat: Oropharynx is clear and moist. No oropharyngeal exudate.   Eyes: Conjunctivae and EOM are normal. Pupils are equal, round, and reactive to light. Right eye exhibits no discharge. Left eye exhibits no discharge. No scleral icterus.   Neck: Normal range of motion. Neck supple. No thyromegaly present.   Cardiovascular: Normal rate, regular rhythm, normal heart sounds and intact distal pulses.   No murmur heard.  Pulmonary/Chest: Effort normal and breath sounds normal. No respiratory distress. He has no wheezes.   Abdominal: He exhibits distension.   Musculoskeletal: He exhibits no edema.   Lymphadenopathy:     He has no cervical adenopathy.   Neurological: He is oriented to person, place, and time. He appears lethargic. GCS eye subscore is 4. GCS verbal subscore is 5. GCS motor subscore is 6.   Skin: Skin is warm and dry. He is not diaphoretic.   Psychiatric: He has a normal mood and affect.   Nursing note and vitals reviewed.      Neurological Exam:   LOC: drowsy  Attention Span: poor  Language: Global aphasia  Articulation: Dysarthria  Orientation: Person, Place, Time   Visual Fields: Hemianopsia left  EOM (CN III, IV, VI): Gaze preference  right  Pupils (CN II, III): PERRL  Facial Sensation (CN V): Normal  Facial Movement (CN VII): Lower facial weakness on the Left  Motor: Arm left  Paresis: 1/5  Leg left  Paresis: 1/5  Arm right  Normal 5/5  Leg right Normal 5/5  Cebellar: Lower Extremity Appendicular Ataxia (Heel to Shin)  Right  Sensation:  Yunior-anesthesia left      Laboratory:  CMP: No results for input(s): GLUCOSE, CALCIUM, ALBUMIN, PROT, NA, K, CO2, CL, BUN, CREATININE, ALKPHOS, ALT, AST, BILITOT in the last 24 hours.  CBC: No results for input(s): WBC, RBC, HGB, HCT, PLT, MCV, MCH, MCHC in the last 168 hours.  Lipid Panel: No results for input(s): CHOL, LDLCALC, HDL, TRIG in the last 168 hours.  Coagulation: No results for input(s): PT, INR, APTT in the last 168 hours.  Hgb A1C: No results for input(s): HGBA1C in the last 168 hours.  TSH: No results for input(s): TSH in the last 168 hours.    Diagnostic Results:      Brain imaging:  CTH 11/8/18 performed at OSH.  Negative for acute findings.    MRI Brain pending    Vessel Imaging:  CTA Stroke MP 11/09/2018 Abrupt termination of the distal M1 segment of the right MCA, compatible with occlusion.  Associated hypodensity centered within the right sub insular region is compatible with early ischemic change of right MCA territory infarction.  No definite hemorrhage, noting the adjacent basal ganglia appear hyperdense adjacent to the involved hypodense white matter.  Diminutive caliber of vessels, as above, possibly related to vasospasm.    Cardiac Evaluation:   2D Echo pending

## 2018-11-09 NOTE — HOSPITAL COURSE
11/09/18:  Pt transferred to [facility] s/p tPA. IR for right M1 thrombectomy. Admitted to [facility]  11/12/18:  Pt kept in ICU on hemicrani watch 2/2 large R MCA stroke, no significant L midline shift.  11/13/18:  Pt kept in ICU for frequent neuro checks. CT head w/ 2 mm MLS. If he remains stable, will step down.  11/14   Patient started on lisinopril 11/10 developed angioedema 11/13 lisinopril dcd. Given benadryl 25mg x1 and solumedrol 40mg q6h x24h. Swelling of lips and tongue decreased.today. Continue amlodipine and hydralazine for BP management  11/15Swelling of lips and tongue decreased. No difficulty swallowing. Hemodynamically stable. No significant events over night. Will step down to [facility]. Neuro today
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
## 2018-11-09 NOTE — SIGNIFICANT EVENT
Radiology Contrast Extravasation:    During power injection of contrast for CTA head and neck multiphase, 50 cc of omnipaque 350 contrast extravasated into the soft tissues of the pt's left anticubital fossa. The pt remained vascularly intact within the left upper extremity. However, as the patient suffered a right MCA infarction, he is unable to sense pain or light touch at the site.  At the time of my examination, the patient's left upper extremity had become tense both above and below the elbow.  Pulses remained equal.  NCC and ED aware and will continue to monitor for compartment syndrome.    Plan:  Cold pack was ordered to be placed for 1 hour on, 1 hour off for 4 cycles to be completed in total. The left upper extremity is to be elevated until the local swelling subsides. The arm is to be assessed Q30min x4 hours, then Q4H until discontinued.     The pt's primary team was informed of the situation.     Ashley Kilgore MD  Resident  Department of Radiology  Pager: [phone]

## 2018-11-09 NOTE — PROGRESS NOTES
Occupational Therapy   Not Seen    Tyree Pablo   MRN: [id number]       Patient not seen for initial evaluation on this date 2* being unavailable; team placing A line.  Will attempt eval 11/10.    GABRIELLE Bradshaw  11/9/2018
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
## 2018-11-09 NOTE — PROGRESS NOTES
Procedure complete pt tolerated well. Angioseal 8 fr used to seal R groin. Hemostasis achieved at 0622. Pt to remain flat for 2 hours, until 0822. Pt will transfer to ICU to recovery s/p cerebral angiogram. Pt will be transported back to room via ICU nurseSilvestre
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
## 2018-11-09 NOTE — ASSESSMENT & PLAN NOTE
43 y.o. male with significant past medical history of HTN, smoking presented to hospital as a transfer from [facility] for evaluation of acute left sided weakness likely due to R MCA stroke, s/p tPA administration.  R M1 occlusion with early ischemic changes and cytotoxic edema.    Antithrombotics for secondary stroke prevention: Antiplatelets: None: Hold all Antithrombotics x 24 hours after IV t-PA administration and ASA 81 mg daily 24 hrs post tPA administration    Statins for secondary stroke prevention and hyperlipidemia, if present:   Statins: Atorvastatin- 40 mg daily    Aggressive risk factor modification: HTN, Smoking, Obesity     Rehab efforts: PT/OT/SLP to evaluate and treat    Diagnostics ordered/pending: HgbA1C to assess blood glucose levels, Lipid Profile to assess cholesterol levels, MRI head without contrast to assess brain parenchyma, TTE to assess cardiac function/status , TSH to assess thyroid function    VTE prophylaxis: None: Reason for No Pharmacological VTE Prophylaxis: Mechanical prophylaxis: Place SCDs, hold pharmacologic prophylaxis for 24 hrs post tPA administration    BP parameters: Infarct: Post tPA, SBP <180

## 2018-11-09 NOTE — PLAN OF CARE
[id number] - [place], [place] - [address]  [address]  [place] [address]  Phone: [phone] Fax: [phone]    This CM spoke with patient sister and spouse at bedside.       11/09/18 1330   Discharge Assessment   Assessment Type Discharge Planning Assessment   Confirmed/corrected address and phone number on facesheet? Yes   Assessment information obtained from? Caregiver   Expected Length of Stay (days) 3   Communicated expected length of stay with patient/caregiver no   Prior to hospitilization cognitive status: Alert/Oriented   Prior to hospitalization functional status: Independent   Current cognitive status: Unable to Assess   Current Functional Status: Needs Assistance   Facility Arrived From: (via ambulance from [facility])   Lives With alone   Able to Return to Prior Arrangements unable to determine at this time (comments)   Is patient able to care for self after discharge? Unable to determine at this time (comments)   Who are your caregiver(s) and their phone number(s)? (mother Stacy Logan [phone] & spouse Chhaya Jacome [phone])   Patient's perception of discharge disposition other (comments)  (khai)   Readmission Within The Last 30 Days no previous admission in last 30 days   Patient currently being followed by outpatient case management? No   Patient currently receives any other outside agency services? No   Equipment Currently Used at Home none   Do you have any problems affording any of your prescribed medications? No   Is the patient taking medications as prescribed? yes   Does the patient have transportation home? Yes   Transportation Available family or friend will provide   Does the patient receive services at the [facility]? No   Discharge Plan A Rehab   Discharge Plan B Home with family   Patient/Family In Agreement With Plan yes     Heidy De La Paz RN/BSN/NILSA  [phone]  [facility]

## 2018-11-10 LAB
ALBUMIN SERPL BCP-MCNC: 3.6 G/DL
ALP SERPL-CCNC: 83 U/L
ALT SERPL W/O P-5'-P-CCNC: 15 U/L
ANION GAP SERPL CALC-SCNC: 8 MMOL/L
ASCENDING AORTA: 3.19 CM
AST SERPL-CCNC: 14 U/L
AV MEAN GRADIENT: 12.29 MMHG
AV PEAK GRADIENT: 18.49 MMHG
AV VALVE AREA: 2.58 CM2
BASOPHILS # BLD AUTO: 0.02 K/UL
BASOPHILS NFR BLD: 0.3 %
BILIRUB SERPL-MCNC: 1 MG/DL
BSA FOR ECHO PROCEDURE: 2.22 M2
BUN SERPL-MCNC: 16 MG/DL
CALCIUM SERPL-MCNC: 8.8 MG/DL
CHLORIDE SERPL-SCNC: 105 MMOL/L
CO2 SERPL-SCNC: 25 MMOL/L
CREAT SERPL-MCNC: 0.9 MG/DL
CV ECHO LV RWT: 0.41 CM
DIFFERENTIAL METHOD: ABNORMAL
DOP CALC AO PEAK VEL: 2.15 M/S
DOP CALC AO VTI: 29.12 CM
DOP CALC LVOT AREA: 4.08 CM2
DOP CALC LVOT DIAMETER: 2.28 CM
DOP CALC LVOT STROKE VOLUME: 75 CM3
DOP CALCLVOT PEAK VEL VTI: 18.38 CM
E WAVE DECELERATION TIME: 108.03 MSEC
E/A RATIO: 1.02
ECHO LV POSTERIOR WALL: 1.11 CM (ref 0.6–1.1)
EOSINOPHIL # BLD AUTO: 0 K/UL
EOSINOPHIL NFR BLD: 0.4 %
ERYTHROCYTE [DISTWIDTH] IN BLOOD BY AUTOMATED COUNT: 16.9 %
EST. GFR  (AFRICAN AMERICAN): >60 ML/MIN/1.73 M^2
EST. GFR  (NON AFRICAN AMERICAN): >60 ML/MIN/1.73 M^2
FRACTIONAL SHORTENING: 39 % (ref 28–44)
GLUCOSE SERPL-MCNC: 119 MG/DL
HCT VFR BLD AUTO: 40 %
HGB BLD-MCNC: 13.1 G/DL
IMM GRANULOCYTES # BLD AUTO: 0.02 K/UL
IMM GRANULOCYTES NFR BLD AUTO: 0.3 %
INTERVENTRICULAR SEPTUM: 1.07 CM (ref 0.6–1.1)
IVRT: 0.1 MSEC
LA MAJOR: 5.07 CM
LA MINOR: 5.23 CM
LA WIDTH: 4.21 CM
LEFT ATRIUM SIZE: 3.53 CM
LEFT ATRIUM VOLUME INDEX: 29.3 ML/M2
LEFT ATRIUM VOLUME: 65.04 CM3
LEFT INTERNAL DIMENSION IN SYSTOLE: 3.32 CM (ref 2.1–4)
LEFT VENTRICLE DIASTOLIC VOLUME INDEX: 65.72 ML/M2
LEFT VENTRICLE DIASTOLIC VOLUME: 145.9 ML
LEFT VENTRICLE MASS INDEX: 107 G/M2
LEFT VENTRICLE SYSTOLIC VOLUME INDEX: 20.1 ML/M2
LEFT VENTRICLE SYSTOLIC VOLUME: 44.63 ML
LEFT VENTRICULAR INTERNAL DIMENSION IN DIASTOLE: 5.48 CM (ref 3.5–6)
LEFT VENTRICULAR MASS: 237.63 G
LV LATERAL E/E' RATIO: 12.5
LYMPHOCYTES # BLD AUTO: 2 K/UL
LYMPHOCYTES NFR BLD: 25.2 %
MAGNESIUM SERPL-MCNC: 2.3 MG/DL
MCH RBC QN AUTO: 25.2 PG
MCHC RBC AUTO-ENTMCNC: 32.8 G/DL
MCV RBC AUTO: 77 FL
MONOCYTES # BLD AUTO: 0.5 K/UL
MONOCYTES NFR BLD: 6.1 %
MV PEAK A VEL: 0.98 M/S
MV PEAK E VEL: 1 M/S
NEUTROPHILS # BLD AUTO: 5.2 K/UL
NEUTROPHILS NFR BLD: 67.7 %
NRBC BLD-RTO: 0 /100 WBC
PHOSPHATE SERPL-MCNC: 3.5 MG/DL
PLATELET # BLD AUTO: 280 K/UL
PMV BLD AUTO: 11.4 FL
POCT GLUCOSE: 118 MG/DL (ref 70–110)
POTASSIUM SERPL-SCNC: 3.2 MMOL/L
PROT SERPL-MCNC: 6.6 G/DL
PULM VEIN S/D RATIO: 1.54
PV PEAK D VEL: 0.39 M/S
PV PEAK S VEL: 0.6 M/S
RA MAJOR: 4.37 CM
RA PRESSURE: 3 MMHG
RA WIDTH: 3.8 CM
RBC # BLD AUTO: 5.2 M/UL
RIGHT VENTRICULAR END-DIASTOLIC DIMENSION: 3.53 CM
RV TISSUE DOPPLER FREE WALL SYSTOLIC VELOCITY 1 (APICAL 4 CHAMBER VIEW): 24.44 M/S
SINUS: 3.13 CM
SODIUM SERPL-SCNC: 138 MMOL/L
STJ: 2.86 CM
TDI LATERAL: 0.08
TRICUSPID ANNULAR PLANE SYSTOLIC EXCURSION: 2.94 CM
WBC # BLD AUTO: 7.73 K/UL

## 2018-11-10 PROCEDURE — 80053 COMPREHEN METABOLIC PANEL: CPT

## 2018-11-10 PROCEDURE — 85025 COMPLETE CBC W/AUTO DIFF WBC: CPT

## 2018-11-10 PROCEDURE — 97162 PT EVAL MOD COMPLEX 30 MIN: CPT

## 2018-11-10 PROCEDURE — 97167 OT EVAL HIGH COMPLEX 60 MIN: CPT

## 2018-11-10 PROCEDURE — 63600175 PHARM REV CODE 636 W HCPCS: Performed by: STUDENT IN AN ORGANIZED HEALTH CARE EDUCATION/TRAINING PROGRAM

## 2018-11-10 PROCEDURE — 63600175 PHARM REV CODE 636 W HCPCS: Performed by: PSYCHIATRY & NEUROLOGY

## 2018-11-10 PROCEDURE — 84100 ASSAY OF PHOSPHORUS: CPT

## 2018-11-10 PROCEDURE — 76937 US GUIDE VASCULAR ACCESS: CPT

## 2018-11-10 PROCEDURE — 20000000 HC ICU ROOM

## 2018-11-10 PROCEDURE — 36569 INSJ PICC 5 YR+ W/O IMAGING: CPT

## 2018-11-10 PROCEDURE — 94761 N-INVAS EAR/PLS OXIMETRY MLT: CPT

## 2018-11-10 PROCEDURE — 25000003 PHARM REV CODE 250: Performed by: STUDENT IN AN ORGANIZED HEALTH CARE EDUCATION/TRAINING PROGRAM

## 2018-11-10 PROCEDURE — 83735 ASSAY OF MAGNESIUM: CPT

## 2018-11-10 PROCEDURE — 97112 NEUROMUSCULAR REEDUCATION: CPT

## 2018-11-10 PROCEDURE — 25000003 PHARM REV CODE 250: Performed by: NURSE PRACTITIONER

## 2018-11-10 PROCEDURE — C1751 CATH, INF, PER/CENT/MIDLINE: HCPCS

## 2018-11-10 PROCEDURE — 25000003 PHARM REV CODE 250: Performed by: PSYCHIATRY & NEUROLOGY

## 2018-11-10 PROCEDURE — 02HV33Z INSERTION OF INFUSION DEVICE INTO SUPERIOR VENA CAVA, PERCUTANEOUS APPROACH: ICD-10-PCS | Performed by: PSYCHIATRY & NEUROLOGY

## 2018-11-10 PROCEDURE — 97530 THERAPEUTIC ACTIVITIES: CPT

## 2018-11-10 PROCEDURE — 99233 SBSQ HOSP IP/OBS HIGH 50: CPT | Mod: ,,, | Performed by: NEUROLOGICAL SURGERY

## 2018-11-10 PROCEDURE — 99291 CRITICAL CARE FIRST HOUR: CPT | Mod: ,,, | Performed by: PSYCHIATRY & NEUROLOGY

## 2018-11-10 RX ORDER — NICARDIPINE HYDROCHLORIDE 0.2 MG/ML
1 INJECTION INTRAVENOUS CONTINUOUS
Status: DISCONTINUED | OUTPATIENT
Start: 2018-11-10 | End: 2018-11-11

## 2018-11-10 RX ORDER — HEPARIN SODIUM 5000 [USP'U]/ML
5000 INJECTION, SOLUTION INTRAVENOUS; SUBCUTANEOUS EVERY 8 HOURS
Status: DISCONTINUED | OUTPATIENT
Start: 2018-11-10 | End: 2018-11-22 | Stop reason: HOSPADM

## 2018-11-10 RX ORDER — LISINOPRIL 20 MG/1
40 TABLET ORAL DAILY
Status: DISCONTINUED | OUTPATIENT
Start: 2018-11-10 | End: 2018-11-13

## 2018-11-10 RX ADMIN — HYDRALAZINE HYDROCHLORIDE 10 MG: 20 INJECTION INTRAMUSCULAR; INTRAVENOUS at 08:11

## 2018-11-10 RX ADMIN — HEPARIN SODIUM 5000 UNITS: 5000 INJECTION, SOLUTION INTRAVENOUS; SUBCUTANEOUS at 02:11

## 2018-11-10 RX ADMIN — AMLODIPINE BESYLATE 10 MG: 10 TABLET ORAL at 08:11

## 2018-11-10 RX ADMIN — NICARDIPINE HYDROCHLORIDE 15 MG/HR: 0.2 INJECTION, SOLUTION INTRAVENOUS at 09:11

## 2018-11-10 RX ADMIN — HYDRALAZINE HYDROCHLORIDE 50 MG: 50 TABLET ORAL at 02:11

## 2018-11-10 RX ADMIN — HYDRALAZINE HYDROCHLORIDE 50 MG: 50 TABLET ORAL at 10:11

## 2018-11-10 RX ADMIN — HEPARIN SODIUM 5000 UNITS: 5000 INJECTION, SOLUTION INTRAVENOUS; SUBCUTANEOUS at 10:11

## 2018-11-10 RX ADMIN — HYDRALAZINE HYDROCHLORIDE 50 MG: 50 TABLET ORAL at 05:11

## 2018-11-10 RX ADMIN — ATORVASTATIN CALCIUM 40 MG: 20 TABLET, FILM COATED ORAL at 08:11

## 2018-11-10 RX ADMIN — POTASSIUM CHLORIDE 40 MEQ: 20 SOLUTION ORAL at 05:11

## 2018-11-10 RX ADMIN — POTASSIUM CHLORIDE 40 MEQ: 20 SOLUTION ORAL at 08:11

## 2018-11-10 RX ADMIN — LISINOPRIL 40 MG: 20 TABLET ORAL at 02:11

## 2018-11-10 RX ADMIN — NICARDIPINE HYDROCHLORIDE 12.5 MG/HR: 0.2 INJECTION, SOLUTION INTRAVENOUS at 11:11

## 2018-11-10 RX ADMIN — NICARDIPINE HYDROCHLORIDE 12.5 MG/HR: 0.2 INJECTION, SOLUTION INTRAVENOUS at 02:11

## 2018-11-10 NOTE — PROGRESS NOTES
Pt appears more lethargic during neuro exam with increased dysarthria. NCC notified. Will hold scheduled Hydralazine due to pt not being awake enough to swallow meds. MD Austen aware. Will continue to monitor.

## 2018-11-10 NOTE — PROGRESS NOTES
Radiology Contrast Extravasation Progress Note:    Examined patient at bedside to follow-up 50 cc LUE contrast extravasation.  Pt has remained vascularly intact, associated neuro evaluation limited secondary to right MCA stroke.  Significant LUE swelling remains.  Nursing instructed to continue LUE elevation with application of cold packs until local swelling subsides.      Ashley Kilgore MD  Resident  Department of Radiology  Pager: [phone]

## 2018-11-10 NOTE — PLAN OF CARE
Problem: Occupational Therapy Goal  Goal: Occupational Therapy Goal  OT evaluation completed.  GABRIELLE Bradshaw  11/10/2018

## 2018-11-10 NOTE — PROGRESS NOTES
R groin site assessed. 1 inch by 1/4 inch hematoma assessed. Pt reports pain upon palpation. NCC team notified. Will continue to monitor.

## 2018-11-10 NOTE — CONSULTS
Double lumen PICC placed to(R) BRACHIAL vein.   40 cm in length, 0 cm exposed, and 40 cm arm circumference.  Lot # [id number]

## 2018-11-10 NOTE — PLAN OF CARE
Problem: Patient Care Overview  Goal: Plan of Care Review  Outcome: Ongoing (interventions implemented as appropriate)  POC reviewed with pt and family at 0500. Pt and family verbalized understanding. Questions and concerns addressed. Pt progressing toward goals. Cardene gtt continued to maintain SBP < 140. No sensation remains on LUE/LLE. Ice packs applied on and off and elevation to extravasation site on LUE; swelling remains. Will continue to monitor. See flowsheets for full assessment and VS info

## 2018-11-10 NOTE — PROGRESS NOTES
Pt transferred to MRI at 2000 with 2 RNs, portable monitor, MRI IV tubing and pt transferred to regular sheets. Pt tolerated MRI well. Transported back to  90 at 2045. VSS and neuro status unchanged. Will continue to monitor.

## 2018-11-10 NOTE — PT/OT/SLP EVAL
Physical Therapy Evaluation    Patient Name:  Tyree Pablo   MRN:  [id number]    Recommendations:     Discharge Recommendations:  rehabilitation facility   Discharge Equipment Recommendations: 3-in-1 commode, bath bench   Barriers to discharge: Inaccessible home and Decreased caregiver support    Assessment:     Tyree Pablo is a 43 y.o. male admitted with a medical diagnosis of Embolic stroke involving right middle cerebral artery.  He presents with the following impairments/functional limitations:  weakness, gait instability, decreased upper extremity function, decreased lower extremity function, impaired balance, impaired endurance, decreased safety awareness, impaired functional mobilty, decreased coordination, impaired self care skills, visual deficits, decreased ROM, impaired sensation, abnormal tone, impaired coordination, impaired fine motor, impaired cognition. Pt performed bed mobility max A and sat EOB for 8 min with max A with L lat lean and R UE pushing; limited 2* pt reported HA. Pt will continue to benefit from skilled PT to improve deficits and increase overall functional mobility. Pt will benefit from IP Rehab to return to PLOF, decrease caregiver burden and ensure safety upon d/c.     Rehab Prognosis:  Good; patient would benefit from acute skilled PT services to address these deficits and reach maximum level of function.      Recent Surgery: * No surgery found *      Plan:     During this hospitalization, patient to be seen 4 x/week to address the above listed problems via gait training, therapeutic activities, therapeutic exercises, neuromuscular re-education  · Plan of Care Expires:  12/10/18   Plan of Care Reviewed with: patient, family    Subjective     Communicated with RN prior to session.  Patient found supine in bed and mother present upon PT entry to room, agreeable to evaluation.      Chief Complaint: weakness, HA at end of session  Patient comments/goals: return to  [facility]  Pain/Comfort:  · Pain Rating 1: 0/10  · Pain Rating Post-Intervention 1: 0/10    Patients cultural, spiritual, Buddhism conflicts given the current situation:      Living Environment:  Pt lives with 17 y/o son in 1-story house with 5 ROBBY with R handrails and tub/shower and walk-in with seat. Pt reports (I) with ADLs and amb. Pt currently working as . Pt mother present and reports she is able to provide assist upon d/c   Prior to admission, patients level of function was (I).  Patient has the following equipment: none.  DME owned (not currently used): none.  Upon discharge, patient will have assistance from mother.    Objective:     Patient found with: arterial line, blood pressure cuff, pulse ox (continuous), telemetry, peripheral IV, Condom Catheter     General Precautions: Standard, aspiration, fall, pureed diet   Orthopedic Precautions:N/A   Braces: N/A     Exams:  · Cognitive Exam:  Patient is oriented to Person, Place, Time and Situation  · Gross Motor Coordination:  impaired L LE  · Postural Exam:  Patient presented with the following abnormalities:    · -       Rounded shoulders  · Sensation:    · -       Impaired  light/touch L LE  · Skin Integrity/Edema:      · -       Skin integrity: Visible skin intact  · RLE ROM: WFL  · RLE Strength: WFL  · LLE ROM: WFL  · LLE Strength: 0/5    Functional Mobility:  Bed Mobility:     · Scooting: total assistance and of 2 persons  · Supine to Sit: maximal assistance  · Sit to Supine: maximal assistance    AM-PAC 6 CLICK MOBILITY  Total Score:7       Therapeutic Activities and Exercises:  Pt sat EOB for 8 min with max A with L lat lean and R UE pushing; limited 2* pt reported HA.   Pt with L facial droop and L neglect requiring max vc's to keep head at midline.   Pt and mother educated on:  -role of PT/POC  -safety with mobility  -importance of OOB activity    Patient left with bed in chair position with all lines intact, call button in reach and RN and  mother present.    GOALS:   Multidisciplinary Problems     Physical Therapy Goals        Problem: Physical Therapy Goal    Goal Priority Disciplines Outcome Goal Variances Interventions   Physical Therapy Goal     PT, PT/OT Ongoing (interventions implemented as appropriate)     Description:  Goals to be met by: 2018     Patient will increase functional independence with mobility by performin. Supine to sit with MInimal Assistance  2. Sit to supine with MInimal Assistance  3. Sit to stand transfer with Minimal Assistance  4. Bed to chair transfer with Minimal Assistance  5. Gait  x 50 feet with Moderate Assistance with or without appropriate AD.   6. Sitting at edge of bed x10 minutes with Contact Guard Assistance  7. Lower extremity exercise program x15 reps per handout, with assistance as needed                      History:     Past Medical History:   Diagnosis Date    Acute left-sided weakness 2018    Hypertension        History reviewed. No pertinent surgical history.    Clinical Decision Making:     Decision Making/ Complexity Score   On examination of body system using standardized tests and measures patient presents with 3 or more elements from any of the following: body structures and functions, activity limitations, and/or participation restrictions.  Leading to a clinical presentation that is considered evolving with changing characteristics                              Clinical Decision Making  (Eval Complexity):  Moderate - 27163     Time Tracking:     PT Received On: 11/10/18  PT Start Time: 08     PT Stop Time: 08  PT Total Time (min): 15 min     Billable Minutes: Evaluation 15      VERONICA DUFFY, PT  11/10/2018

## 2018-11-10 NOTE — SIGNIFICANT EVENT
Pt returned from MRI. MRI suggestive of possible hemorrhagic conversion. ASA/hep held. Final read pending.     Trenton Boyce MD  NCC

## 2018-11-10 NOTE — PROCEDURES
"Tyree Pablo is a 43 y.o. male patient.    Temp: 99.2 °F (37.3 °C) (11/10/18 1500)  Pulse: 96 (11/10/18 1600)  Resp: (!) 22 (11/10/18 1600)  BP: 133/66 (11/10/18 1200)  SpO2: 98 % (11/10/18 1600)  Weight: 103 kg (227 lb) (11/10/18 0900)  Height: 5' 8" (172.7 cm) (11/10/18 0900)    PICC  Date/Time: 11/10/2018 5:08 PM  Performed by: Julian Al RN  Consent Done: Yes  Time out: Immediately prior to procedure a time out was called to verify the correct patient, procedure, equipment, support staff and site/side marked as required  Indications: med administration and vascular access  Anesthesia: local infiltration  Local anesthetic: lidocaine 1% without epinephrine  Anesthetic Total (mL): 2  Preparation: skin prepped with ChloraPrep  Skin prep agent dried: skin prep agent completely dried prior to procedure  Sterile barriers: all five maximum sterile barriers used - cap, mask, sterile gown, sterile gloves, and large sterile sheet  Hand hygiene: hand hygiene performed prior to central venous catheter insertion  Location details: right brachial  Catheter type: double lumen  Catheter size: 5 Fr  Catheter Length: 40cm    Ultrasound guidance: yes  Vessel Caliber: medium and patent, compressibility normal  Vascular Doppler: not done  Needle advanced into vessel with real time Ultrasound guidance.  Guidewire confirmed in vessel.  Image recorded and saved.  Sterile sheath used.  no esophageal manometryNumber of attempts: 1  Post-procedure: blood return through all ports, chlorhexidine patch and sterile dressing applied  Specimens: No  Implants: No  Assessment: placement verified by x-ray  Complications: none          Linda Mccarthy  11/10/2018  "

## 2018-11-10 NOTE — PLAN OF CARE
Problem: Physical Therapy Goal  Goal: Physical Therapy Goal  Goals to be met by: 2018     Patient will increase functional independence with mobility by performin. Supine to sit with MInimal Assistance  2. Sit to supine with MInimal Assistance  3. Sit to stand transfer with Minimal Assistance  4. Bed to chair transfer with Minimal Assistance  5. Gait  x 50 feet with Moderate Assistance with or without appropriate AD.   6. Sitting at edge of bed x10 minutes with Contact Guard Assistance  7. Lower extremity exercise program x15 reps per handout, with assistance as needed    Outcome: Ongoing (interventions implemented as appropriate)  Pt evaluation complete; pt goals set.    VERONICA DUFFY, PT  11/10/2018
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
## 2018-11-11 LAB
ALBUMIN SERPL BCP-MCNC: 3.3 G/DL
ALP SERPL-CCNC: 80 U/L
ALT SERPL W/O P-5'-P-CCNC: 15 U/L
ANION GAP SERPL CALC-SCNC: 8 MMOL/L
AST SERPL-CCNC: 12 U/L
BASOPHILS # BLD AUTO: 0.02 K/UL
BASOPHILS NFR BLD: 0.2 %
BILIRUB SERPL-MCNC: 0.8 MG/DL
BUN SERPL-MCNC: 14 MG/DL
CALCIUM SERPL-MCNC: 8.9 MG/DL
CHLORIDE SERPL-SCNC: 107 MMOL/L
CO2 SERPL-SCNC: 25 MMOL/L
CREAT SERPL-MCNC: 0.8 MG/DL
DIFFERENTIAL METHOD: ABNORMAL
EOSINOPHIL # BLD AUTO: 0 K/UL
EOSINOPHIL NFR BLD: 0.4 %
ERYTHROCYTE [DISTWIDTH] IN BLOOD BY AUTOMATED COUNT: 16.9 %
EST. GFR  (AFRICAN AMERICAN): >60 ML/MIN/1.73 M^2
EST. GFR  (NON AFRICAN AMERICAN): >60 ML/MIN/1.73 M^2
GLUCOSE SERPL-MCNC: 110 MG/DL
HCT VFR BLD AUTO: 36.8 %
HGB BLD-MCNC: 12 G/DL
IMM GRANULOCYTES # BLD AUTO: 0.01 K/UL
IMM GRANULOCYTES NFR BLD AUTO: 0.1 %
LYMPHOCYTES # BLD AUTO: 1.7 K/UL
LYMPHOCYTES NFR BLD: 21.5 %
MAGNESIUM SERPL-MCNC: 2.2 MG/DL
MCH RBC QN AUTO: 25.8 PG
MCHC RBC AUTO-ENTMCNC: 32.6 G/DL
MCV RBC AUTO: 79 FL
MONOCYTES # BLD AUTO: 0.6 K/UL
MONOCYTES NFR BLD: 7.7 %
NEUTROPHILS # BLD AUTO: 5.7 K/UL
NEUTROPHILS NFR BLD: 70.1 %
NRBC BLD-RTO: 0 /100 WBC
PHOSPHATE SERPL-MCNC: 3.8 MG/DL
PLATELET # BLD AUTO: 235 K/UL
PMV BLD AUTO: 11.5 FL
POCT GLUCOSE: 100 MG/DL (ref 70–110)
POCT GLUCOSE: 99 MG/DL (ref 70–110)
POTASSIUM SERPL-SCNC: 3.8 MMOL/L
PROT SERPL-MCNC: 6.4 G/DL
RBC # BLD AUTO: 4.65 M/UL
SODIUM SERPL-SCNC: 140 MMOL/L
WBC # BLD AUTO: 8.1 K/UL

## 2018-11-11 PROCEDURE — 25000003 PHARM REV CODE 250: Performed by: PSYCHIATRY & NEUROLOGY

## 2018-11-11 PROCEDURE — 83735 ASSAY OF MAGNESIUM: CPT

## 2018-11-11 PROCEDURE — 85025 COMPLETE CBC W/AUTO DIFF WBC: CPT

## 2018-11-11 PROCEDURE — 99233 SBSQ HOSP IP/OBS HIGH 50: CPT | Mod: ,,, | Performed by: NEUROLOGICAL SURGERY

## 2018-11-11 PROCEDURE — 99291 CRITICAL CARE FIRST HOUR: CPT | Mod: ,,, | Performed by: PSYCHIATRY & NEUROLOGY

## 2018-11-11 PROCEDURE — 20000000 HC ICU ROOM

## 2018-11-11 PROCEDURE — 25000003 PHARM REV CODE 250: Performed by: NURSE PRACTITIONER

## 2018-11-11 PROCEDURE — 94761 N-INVAS EAR/PLS OXIMETRY MLT: CPT

## 2018-11-11 PROCEDURE — 84100 ASSAY OF PHOSPHORUS: CPT

## 2018-11-11 PROCEDURE — 80053 COMPREHEN METABOLIC PANEL: CPT

## 2018-11-11 PROCEDURE — 63600175 PHARM REV CODE 636 W HCPCS: Performed by: PSYCHIATRY & NEUROLOGY

## 2018-11-11 PROCEDURE — 25000003 PHARM REV CODE 250: Performed by: STUDENT IN AN ORGANIZED HEALTH CARE EDUCATION/TRAINING PROGRAM

## 2018-11-11 RX ADMIN — AMLODIPINE BESYLATE 10 MG: 10 TABLET ORAL at 09:11

## 2018-11-11 RX ADMIN — HEPARIN SODIUM 5000 UNITS: 5000 INJECTION, SOLUTION INTRAVENOUS; SUBCUTANEOUS at 06:11

## 2018-11-11 RX ADMIN — HYDRALAZINE HYDROCHLORIDE 50 MG: 50 TABLET ORAL at 02:11

## 2018-11-11 RX ADMIN — HEPARIN SODIUM 5000 UNITS: 5000 INJECTION, SOLUTION INTRAVENOUS; SUBCUTANEOUS at 02:11

## 2018-11-11 RX ADMIN — LISINOPRIL 40 MG: 20 TABLET ORAL at 09:11

## 2018-11-11 RX ADMIN — HYDRALAZINE HYDROCHLORIDE 50 MG: 50 TABLET ORAL at 09:11

## 2018-11-11 RX ADMIN — ATORVASTATIN CALCIUM 40 MG: 20 TABLET, FILM COATED ORAL at 09:11

## 2018-11-11 RX ADMIN — HEPARIN SODIUM 5000 UNITS: 5000 INJECTION, SOLUTION INTRAVENOUS; SUBCUTANEOUS at 09:11

## 2018-11-11 RX ADMIN — HYDRALAZINE HYDROCHLORIDE 50 MG: 50 TABLET ORAL at 06:11

## 2018-11-11 NOTE — CONSULTS
[facility]  Neurosurgery  Consult Note    Inpatient consult to Neurosurgery  Consult performed by: Otilio Lara MD  Consult ordered by: Cindi Barry NP  Reason for consult: hemicrani watch        Subjective:     Chief Complaint/Reason for Admission: R MCA stroke    History of Present Illness: Mr Pablo is a 43M with history of HTN that presented to [facility] 11/9 0500 s/p tPA for R M1 occlusion, was taken for thrombectomy with TICI 2B recanalization. 24 hour MRI after stroke showed R MCA territory stroke and early signs of edema. Patient denies anticoagulation/antiplatelet medications. Neurosurgery consulted for hemicraniectomy watch        Medications Prior to Admission   Medication Sig Dispense Refill Last Dose    amLODIPine (NORVASC) 10 MG tablet Take 10 mg by mouth once daily.   11/9/2018       Review of patient's allergies indicates:  No Known Allergies    Past Medical History:   Diagnosis Date    Acute left-sided weakness 11/8/2018    Hypertension      History reviewed. No pertinent surgical history.  Family History     Problem Relation (Age of Onset)    Diabetes Mother    Stroke Father        Tobacco Use    Smoking status: Current Every Day Smoker     Packs/day: 1.00     Years: 5.00     Pack years: 5.00     Types: Cigarettes    Smokeless tobacco: Never Used   Substance and Sexual Activity    Alcohol use: Yes     Comment: Occasionally    Drug use: Yes     Types: Marijuana    Sexual activity: Not on file     Review of Systems   Constitutional: Negative for activity change and appetite change.   HENT: Negative for congestion and dental problem.    Eyes: Negative for discharge and itching.   Respiratory: Negative for apnea and chest tightness.    Cardiovascular: Negative for chest pain and palpitations.   Gastrointestinal: Negative for abdominal distention and abdominal pain.   Endocrine: Negative for cold intolerance and heat intolerance.   Genitourinary: Negative for  difficulty urinating.   Musculoskeletal: Negative for arthralgias and back pain.   Neurological: Positive for facial asymmetry, weakness and numbness. Negative for dizziness.   Psychiatric/Behavioral: Negative for agitation.     Objective:     Weight: 103 kg (227 lb)  Body mass index is 34.52 kg/m².  Vital Signs (Most Recent):  Temp: 99.1 °F (37.3 °C) (11/10/18 1900)  Pulse: 99 (11/10/18 2100)  Resp: 20 (11/10/18 2100)  BP: 137/70 (11/10/18 2000)  SpO2: 98 % (11/10/18 2100) Vital Signs (24h Range):  Temp:  [98.1 °F (36.7 °C)-99.2 °F (37.3 °C)] 99.1 °F (37.3 °C)  Pulse:  [] 99  Resp:  [17-32] 20  SpO2:  [94 %-100 %] 98 %  BP: (129-148)/(60-78) 137/70  Arterial Line BP: (118-154)/(49-82) 149/67     Date 11/10/18 0700 - 11/11/18 0659   Shift 8701-7027 0835-7115 3435-4293 24 Hour Total   INTAKE   P.O. 750   750   I.V.(mL/kg) 562.5(5.5) 256.3(2.5)  818.8(8)   Shift Total(mL/kg) 1312.5(12.7) 256.3(2.5)  1568.8(15.2)   OUTPUT   Urine(mL/kg/hr)  450  450   Shift Total(mL/kg)  450(4.4)  450(4.4)   Weight (kg) 103 103 103 103              Resp Rate Total:  [20 br/min-23 br/min] 20 br/min    Male External Urinary Catheter 11/09/18 0715 Large (Active)   Collection Container Urimeter 11/10/2018  3:05 PM   Securement Method secured to top of thigh w/ adhesive device 11/10/2018  3:05 PM   Skin no redness;no breakdown 11/10/2018  3:05 PM   Tolerance no signs/symptoms of discomfort 11/10/2018  3:05 PM   Output (mL) 450 mL 11/10/2018  3:00 PM   Catheter Change Date 11/10/18 11/10/2018  3:05 AM   Catheter Change Time 1915 11/9/2018  3:05 PM       Neurosurgery Physical Exam  AAOx3  L facial droop, mild dysarthria  R gaze deviation, PERRL  RUE/RLE 5/5 throughout  LUE/LLE plegic  No sensation LUE/LLE      Significant Labs:  Recent Labs   Lab 11/09/18  0353 11/09/18  1545 11/10/18  0147   GLU 99 118* 119*    141 138   K 4.7 3.5 3.2*    108 105   CO2 24 23 25   BUN 18 18 16   CREATININE 1.2 0.9 0.9   CALCIUM 9.2 9.4 8.8    MG  --   --  2.3     Recent Labs   Lab 11/09/18  0353 11/10/18  0147   WBC 8.88 7.73   HGB 14.2 13.1*   HCT 44.8 40.0    280     Recent Labs   Lab 11/09/18  0353   INR 1.0     Microbiology Results (last 7 days)     ** No results found for the last 168 hours. **        All pertinent labs from the last 24 hours have been reviewed.    Significant Diagnostics:  I have reviewed all pertinent imaging results/findings within the past 24 hours.    Assessment/Plan:     Cytotoxic cerebral edema    43M with R MCA stroke s/p tPA and trombectomy yesterday, now with MRI showing R MCA distribution stroke    -no acute neurosurgical intervention  -will monitor closely  -HOB30  -q1h neuro checks  -Na goal 145-155  -medical management per NCC and stroke neurology           Thank you for your consult. I will follow-up with patient. Please contact us if you have any additional questions.    Otilio Lara MD  Neurosurgery  [facility]

## 2018-11-11 NOTE — SUBJECTIVE & OBJECTIVE
Interval History: NAEON, stable exam    Medications:  Continuous Infusions:  Scheduled Meds:   amLODIPine  10 mg Oral Daily    atorvastatin  40 mg Oral Daily    heparin (porcine)  5,000 Units Subcutaneous Q8H    hydrALAZINE  50 mg Oral Q8H    lisinopril  40 mg Oral Daily     PRN Meds:dextrose 50%, glucagon (human recombinant), hydrALAZINE, insulin aspart U-100, lidocaine (PF) 10 mg/ml (1%), magnesium oxide, magnesium oxide, potassium chloride 10%, potassium chloride 10%, potassium chloride 10%, potassium, sodium phosphates, potassium, sodium phosphates, potassium, sodium phosphates, sodium chloride 0.9%     Review of Systems  Objective:     Weight: 103 kg (227 lb)  Body mass index is 34.52 kg/m².  Vital Signs (Most Recent):  Temp: 98.5 °F (36.9 °C) (11/11/18 1500)  Pulse: 100 (11/11/18 1700)  Resp: 18 (11/11/18 1700)  BP: (!) 140/75 (11/11/18 0000)  SpO2: 97 % (11/11/18 1700) Vital Signs (24h Range):  Temp:  [98.5 °F (36.9 °C)-99.2 °F (37.3 °C)] 98.5 °F (36.9 °C)  Pulse:  [] 100  Resp:  [18-30] 18  SpO2:  [95 %-100 %] 97 %  BP: (137-140)/(70-75) 140/75  Arterial Line BP: ()/(58-88) 106/88     Date 11/11/18 0700 - 11/12/18 0659   Shift 3340-0485 3968-3741 4943-4914 24 Hour Total   INTAKE   P.O. 350   350   I.V.(mL/kg) 500(4.9)   500(4.9)   Shift Total(mL/kg) 850(8.3)   850(8.3)   OUTPUT   Urine(mL/kg/hr) 1050(1.3)   1050   Shift Total(mL/kg) 1050(10.2)   1050(10.2)   Weight (kg) 103 103 103 103              Resp Rate Total:  [18 br/min-23 br/min] 20 br/min    Male External Urinary Catheter 11/09/18 0715 Large (Active)   Collection Container Urimeter 11/11/2018  3:05 PM   Securement Method secured to top of thigh w/ adhesive device 11/11/2018  3:05 PM   Skin no redness;no breakdown 11/11/2018  3:05 PM   Tolerance no signs/symptoms of discomfort 11/11/2018  3:05 PM   Output (mL) 300 mL 11/11/2018  2:00 PM   Catheter Change Date 11/11/18 11/11/2018  3:05 PM   Catheter Change Time 1000 11/11/2018  3:05  PM       Neurosurgery Physical Exam  AAOx3  L facial droop, mild dysarthria  R gaze deviation, PERRL  RUE/RLE 5/5 throughout  LUE/LLE plegic  No sensation LUE/LLE      Significant Labs:  Recent Labs   Lab 11/10/18  0147 11/11/18  0151   * 110    140   K 3.2* 3.8    107   CO2 25 25   BUN 16 14   CREATININE 0.9 0.8   CALCIUM 8.8 8.9   MG 2.3 2.2     Recent Labs   Lab 11/10/18  0147 11/11/18  0151   WBC 7.73 8.10   HGB 13.1* 12.0*   HCT 40.0 36.8*    235     No results for input(s): LABPT, INR, APTT in the last 48 hours.  Microbiology Results (last 7 days)     ** No results found for the last 168 hours. **        All pertinent labs from the last 24 hours have been reviewed.    Significant Diagnostics:  I have reviewed all pertinent imaging results/findings within the past 24 hours.
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
## 2018-11-11 NOTE — SUBJECTIVE & OBJECTIVE
Medications Prior to Admission   Medication Sig Dispense Refill Last Dose    amLODIPine (NORVASC) 10 MG tablet Take 10 mg by mouth once daily.   11/9/2018       Review of patient's allergies indicates:  No Known Allergies    Past Medical History:   Diagnosis Date    Acute left-sided weakness 11/8/2018    Hypertension      History reviewed. No pertinent surgical history.  Family History     Problem Relation (Age of Onset)    Diabetes Mother    Stroke Father        Tobacco Use    Smoking status: Current Every Day Smoker     Packs/day: 1.00     Years: 5.00     Pack years: 5.00     Types: Cigarettes    Smokeless tobacco: Never Used   Substance and Sexual Activity    Alcohol use: Yes     Comment: Occasionally    Drug use: Yes     Types: Marijuana    Sexual activity: Not on file     Review of Systems   Constitutional: Negative for activity change and appetite change.   HENT: Negative for congestion and dental problem.    Eyes: Negative for discharge and itching.   Respiratory: Negative for apnea and chest tightness.    Cardiovascular: Negative for chest pain and palpitations.   Gastrointestinal: Negative for abdominal distention and abdominal pain.   Endocrine: Negative for cold intolerance and heat intolerance.   Genitourinary: Negative for difficulty urinating.   Musculoskeletal: Negative for arthralgias and back pain.   Neurological: Positive for facial asymmetry, weakness and numbness. Negative for dizziness.   Psychiatric/Behavioral: Negative for agitation.     Objective:     Weight: 103 kg (227 lb)  Body mass index is 34.52 kg/m².  Vital Signs (Most Recent):  Temp: 99.1 °F (37.3 °C) (11/10/18 1900)  Pulse: 99 (11/10/18 2100)  Resp: 20 (11/10/18 2100)  BP: 137/70 (11/10/18 2000)  SpO2: 98 % (11/10/18 2100) Vital Signs (24h Range):  Temp:  [98.1 °F (36.7 °C)-99.2 °F (37.3 °C)] 99.1 °F (37.3 °C)  Pulse:  [] 99  Resp:  [17-32] 20  SpO2:  [94 %-100 %] 98 %  BP: (129-148)/(60-78) 137/70  Arterial Line BP:  (118-154)/(49-82) 149/67     Date 11/10/18 0700 - 11/11/18 0659   Shift 4128-1812 3060-5321 4736-1878 24 Hour Total   INTAKE   P.O. 750   750   I.V.(mL/kg) 562.5(5.5) 256.3(2.5)  818.8(8)   Shift Total(mL/kg) 1312.5(12.7) 256.3(2.5)  1568.8(15.2)   OUTPUT   Urine(mL/kg/hr)  450  450   Shift Total(mL/kg)  450(4.4)  450(4.4)   Weight (kg) 103 103 103 103              Resp Rate Total:  [20 br/min-23 br/min] 20 br/min    Male External Urinary Catheter 11/09/18 0715 Large (Active)   Collection Container Urimeter 11/10/2018  3:05 PM   Securement Method secured to top of thigh w/ adhesive device 11/10/2018  3:05 PM   Skin no redness;no breakdown 11/10/2018  3:05 PM   Tolerance no signs/symptoms of discomfort 11/10/2018  3:05 PM   Output (mL) 450 mL 11/10/2018  3:00 PM   Catheter Change Date 11/10/18 11/10/2018  3:05 AM   Catheter Change Time 1915 11/9/2018  3:05 PM       Neurosurgery Physical Exam  AAOx3  L facial droop, mild dysarthria  R gaze deviation, PERRL  RUE/RLE 5/5 throughout  LUE/LLE plegic  No sensation LUE/LLE      Significant Labs:  Recent Labs   Lab 11/09/18  0353 11/09/18  1545 11/10/18  0147   GLU 99 118* 119*    141 138   K 4.7 3.5 3.2*    108 105   CO2 24 23 25   BUN 18 18 16   CREATININE 1.2 0.9 0.9   CALCIUM 9.2 9.4 8.8   MG  --   --  2.3     Recent Labs   Lab 11/09/18  0353 11/10/18  0147   WBC 8.88 7.73   HGB 14.2 13.1*   HCT 44.8 40.0    280     Recent Labs   Lab 11/09/18 0353   INR 1.0     Microbiology Results (last 7 days)     ** No results found for the last 168 hours. **        All pertinent labs from the last 24 hours have been reviewed.    Significant Diagnostics:  I have reviewed all pertinent imaging results/findings within the past 24 hours.

## 2018-11-11 NOTE — HPI
Mr Pablo is a 43M with history of HTN that presented to [facility] 11/9 0500 s/p tPA for R M1 occlusion, was taken for thrombectomy with TICI 2B recanalization. 24 hour MRI after stroke showed R MCA territory stroke and early signs of edema. Patient denies anticoagulation/antiplatelet medications. Neurosurgery consulted for hemicraniectomy watch

## 2018-11-11 NOTE — ASSESSMENT & PLAN NOTE
Exam stable  Mri with large R mca stroke, mild petechial ich holding asa per neurovascular team  Remains on cardene, will add lisinopril today  Goal eunatremia, plan for picc placement for hyperosmolar therapy if herniation occurs  Consult neurosurgery for hc watch, monitor exam and npi  Pt/ot, passed for diet

## 2018-11-11 NOTE — SUBJECTIVE & OBJECTIVE
Interval History:    Drowsy overnight, did not take po meds  Remains on high dose of cardene  Lue w improved edema in setting of contrast extravasation  Kidney function adequate      Objective:     Vitals:  Temp: 99.2 °F (37.3 °C)  Pulse: 100  Rhythm: normal sinus rhythm  BP: 133/66  MAP (mmHg): 92  Resp: 20  SpO2: 100 %  O2 Device (Oxygen Therapy): room air    Temp  Min: 98.1 °F (36.7 °C)  Max: 99.2 °F (37.3 °C)  Pulse  Min: 87  Max: 105  BP  Min: 118/69  Max: 148/68  MAP (mmHg)  Min: 87  Max: 95  Resp  Min: 17  Max: 108  SpO2  Min: 94 %  Max: 100 %    11/09 0701 - 11/10 0700  In: 1401.1 [I.V.:1401.1]  Out: 875 [Urine:875]   Unmeasured Output  Urine Occurrence: 1       Vital signs, lab studies, and imaging reviewed by me  Alert, oriented x3, improved L neglect (could recognize hand, continues to have extinction) eom full w L gaze pref, dysarthria, L plegia  Warm and well perfused, regular rate and rhythm, no murmurs  L ue edematous w good distal perfusion  Breathing comfortably, breath sounds clear  Belly soft, nontender, no hepatosplenomegaly      Medications:  Continuous  nicardipine Last Rate: 2.5 mg/hr (11/10/18 1830)   Scheduled  amLODIPine 10 mg Daily   atorvastatin 40 mg Daily   heparin (porcine) 5,000 Units Q8H   hydrALAZINE 50 mg Q8H   lisinopril 40 mg Daily   PRN  dextrose 50% 12.5 g PRN   glucagon (human recombinant) 1 mg PRN   hydrALAZINE 10 mg Q4H PRN   insulin aspart U-100 1-10 Units Q6H PRN   lidocaine (PF) 10 mg/ml (1%) 1 mL On Call Procedure   magnesium oxide 800 mg PRN   magnesium oxide 800 mg PRN   potassium chloride 10% 40 mEq PRN   potassium chloride 10% 40 mEq PRN   potassium chloride 10% 60 mEq PRN   potassium, sodium phosphates 2 packet PRN   potassium, sodium phosphates 2 packet PRN   potassium, sodium phosphates 2 packet PRN   sodium chloride 0.9% 5 mL PRN     Diet  Diet Dysphagia Pureed (IDDSI Level 4) Thin  Diet Dysphagia Pureed (IDDSI Level 4) Thin

## 2018-11-11 NOTE — ASSESSMENT & PLAN NOTE
43M with R MCA stroke s/p tPA and trombectomy yesterday, now with MRI showing R MCA distribution stroke    -no acute neurosurgical intervention  -will monitor closely  -HOB30  -q1h neuro checks  -Na goal 145-155  -medical management per NCC and stroke neurology

## 2018-11-11 NOTE — ASSESSMENT & PLAN NOTE
Exam stable  goal sbp 160  Goal eunatremia, bolus hyperosmolar therapy if herniation occurs  neurosurgery on board for hc watch, monitor exam and npi  Echo done wout bubble, ordered today   Us le for dvt

## 2018-11-11 NOTE — PROGRESS NOTES
[facility]  Neurosurgery  Progress Note    Subjective:     History of Present Illness: Mr Pablo is a 43M with history of HTN that presented to [facility] 11/9 0500 s/p tPA for R M1 occlusion, was taken for thrombectomy with TICI 2B recanalization. 24 hour MRI after stroke showed R MCA territory stroke and early signs of edema. Patient denies anticoagulation/antiplatelet medications. Neurosurgery consulted for hemicraniectomy watch        Post-Op Info:  * No surgery found *         Interval History: NAEON, stable exam    Medications:  Continuous Infusions:  Scheduled Meds:   amLODIPine  10 mg Oral Daily    atorvastatin  40 mg Oral Daily    heparin (porcine)  5,000 Units Subcutaneous Q8H    hydrALAZINE  50 mg Oral Q8H    lisinopril  40 mg Oral Daily     PRN Meds:dextrose 50%, glucagon (human recombinant), hydrALAZINE, insulin aspart U-100, lidocaine (PF) 10 mg/ml (1%), magnesium oxide, magnesium oxide, potassium chloride 10%, potassium chloride 10%, potassium chloride 10%, potassium, sodium phosphates, potassium, sodium phosphates, potassium, sodium phosphates, sodium chloride 0.9%     Review of Systems  Objective:     Weight: 103 kg (227 lb)  Body mass index is 34.52 kg/m².  Vital Signs (Most Recent):  Temp: 98.5 °F (36.9 °C) (11/11/18 1500)  Pulse: 100 (11/11/18 1700)  Resp: 18 (11/11/18 1700)  BP: (!) 140/75 (11/11/18 0000)  SpO2: 97 % (11/11/18 1700) Vital Signs (24h Range):  Temp:  [98.5 °F (36.9 °C)-99.2 °F (37.3 °C)] 98.5 °F (36.9 °C)  Pulse:  [] 100  Resp:  [18-30] 18  SpO2:  [95 %-100 %] 97 %  BP: (137-140)/(70-75) 140/75  Arterial Line BP: ()/(58-88) 106/88     Date 11/11/18 0700 - 11/12/18 0659   Shift 0942-8119 5525-0332 9038-1035 24 Hour Total   INTAKE   P.O. 350   350   I.V.(mL/kg) 500(4.9)   500(4.9)   Shift Total(mL/kg) 850(8.3)   850(8.3)   OUTPUT   Urine(mL/kg/hr) 1050(1.3)   1050   Shift Total(mL/kg) 1050(10.2)   1050(10.2)   Weight (kg) 103 103 103 103               Resp Rate Total:  [18 br/min-23 br/min] 20 br/min    Male External Urinary Catheter 11/09/18 0715 Large (Active)   Collection Container Urimeter 11/11/2018  3:05 PM   Securement Method secured to top of thigh w/ adhesive device 11/11/2018  3:05 PM   Skin no redness;no breakdown 11/11/2018  3:05 PM   Tolerance no signs/symptoms of discomfort 11/11/2018  3:05 PM   Output (mL) 300 mL 11/11/2018  2:00 PM   Catheter Change Date 11/11/18 11/11/2018  3:05 PM   Catheter Change Time 1000 11/11/2018  3:05 PM       Neurosurgery Physical Exam  AAOx3  L facial droop, mild dysarthria  R gaze deviation, PERRL  RUE/RLE 5/5 throughout  LUE/LLE plegic  No sensation LUE/LLE      Significant Labs:  Recent Labs   Lab 11/10/18  0147 11/11/18  0151   * 110    140   K 3.2* 3.8    107   CO2 25 25   BUN 16 14   CREATININE 0.9 0.8   CALCIUM 8.8 8.9   MG 2.3 2.2     Recent Labs   Lab 11/10/18  0147 11/11/18  0151   WBC 7.73 8.10   HGB 13.1* 12.0*   HCT 40.0 36.8*    235     No results for input(s): LABPT, INR, APTT in the last 48 hours.  Microbiology Results (last 7 days)     ** No results found for the last 168 hours. **        All pertinent labs from the last 24 hours have been reviewed.    Significant Diagnostics:  I have reviewed all pertinent imaging results/findings within the past 24 hours.    Assessment/Plan:     Cytotoxic cerebral edema    43M with R MCA stroke s/p tPA and trombectomy yesterday, now with MRI showing R MCA distribution stroke    -no acute neurosurgical intervention  -will monitor closely  -HOB30  -q1h neuro checks  -Na goal 145-155  -medical management per NCC and stroke neurology           Otilio Lara MD  Neurosurgery  [facility]

## 2018-11-11 NOTE — SUBJECTIVE & OBJECTIVE
Interval History: ***    Medications:  Continuous Infusions:   nicardipine 2.5 mg/hr (11/10/18 2100)     Scheduled Meds:   amLODIPine  10 mg Oral Daily    atorvastatin  40 mg Oral Daily    heparin (porcine)  5,000 Units Subcutaneous Q8H    hydrALAZINE  50 mg Oral Q8H    lisinopril  40 mg Oral Daily     PRN Meds:dextrose 50%, glucagon (human recombinant), hydrALAZINE, insulin aspart U-100, lidocaine (PF) 10 mg/ml (1%), magnesium oxide, magnesium oxide, potassium chloride 10%, potassium chloride 10%, potassium chloride 10%, potassium, sodium phosphates, potassium, sodium phosphates, potassium, sodium phosphates, sodium chloride 0.9%     Review of Systems   Constitutional: Negative for activity change and appetite change.   HENT: Negative for congestion and dental problem.    Eyes: Negative for pain and discharge.   Respiratory: Negative for apnea and chest tightness.    Cardiovascular: Negative for chest pain.   Gastrointestinal: Negative for abdominal distention and abdominal pain.   Endocrine: Negative for cold intolerance and heat intolerance.   Genitourinary: Negative for difficulty urinating.   Musculoskeletal: Negative for arthralgias and back pain.   Neurological: Positive for facial asymmetry, weakness and numbness. Negative for dizziness, seizures and headaches.   Psychiatric/Behavioral: Negative for agitation and behavioral problems.     Objective:     Weight: 103 kg (227 lb)  Body mass index is 34.52 kg/m².  Vital Signs (Most Recent):  Temp: 99.1 °F (37.3 °C) (11/10/18 1900)  Pulse: 99 (11/10/18 2100)  Resp: 20 (11/10/18 2100)  BP: 137/70 (11/10/18 2000)  SpO2: 98 % (11/10/18 2100) Vital Signs (24h Range):  Temp:  [98.1 °F (36.7 °C)-99.2 °F (37.3 °C)] 99.1 °F (37.3 °C)  Pulse:  [] 99  Resp:  [17-32] 20  SpO2:  [94 %-100 %] 98 %  BP: (129-148)/(60-78) 137/70  Arterial Line BP: (118-154)/(49-82) 149/67     Date 11/10/18 0700 - 11/11/18 0659   Shift 7632-5776 1388-0767 3887-6438 24 Hour Total    INTAKE   P.O. 750   750   I.V.(mL/kg) 562.5(5.5) 256.3(2.5)  818.8(8)   Shift Total(mL/kg) 1312.5(12.7) 256.3(2.5)  1568.8(15.2)   OUTPUT   Urine(mL/kg/hr)  450  450   Shift Total(mL/kg)  450(4.4)  450(4.4)   Weight (kg) 103 103 103 103              Resp Rate Total:  [20 br/min-23 br/min] 20 br/min    Male External Urinary Catheter 11/09/18 0715 Large (Active)   Collection Container Urimeter 11/10/2018  3:05 PM   Securement Method secured to top of thigh w/ adhesive device 11/10/2018  3:05 PM   Skin no redness;no breakdown 11/10/2018  3:05 PM   Tolerance no signs/symptoms of discomfort 11/10/2018  3:05 PM   Output (mL) 450 mL 11/10/2018  3:00 PM   Catheter Change Date 11/10/18 11/10/2018  3:05 AM   Catheter Change Time 1915 11/9/2018  3:05 PM       Neurosurgery Physical Exam  AAOx3  PERRL, R gaze deviation  L facial droop, no left face sensation to light touch  RUE/RLE 5/5 throughout  LUE/LLE 0/5 throughout  No sensation LUE/LLE      Significant Labs:  Recent Labs   Lab 11/09/18  0353 11/09/18  1545 11/10/18  0147   GLU 99 118* 119*    141 138   K 4.7 3.5 3.2*    108 105   CO2 24 23 25   BUN 18 18 16   CREATININE 1.2 0.9 0.9   CALCIUM 9.2 9.4 8.8   MG  --   --  2.3     Recent Labs   Lab 11/09/18  0353 11/10/18  0147   WBC 8.88 7.73   HGB 14.2 13.1*   HCT 44.8 40.0    280     Recent Labs   Lab 11/09/18  0353   INR 1.0     Microbiology Results (last 7 days)     ** No results found for the last 168 hours. **        All pertinent labs from the last 24 hours have been reviewed.    Significant Diagnostics:  I have reviewed all pertinent imaging results/findings within the past 24 hours.

## 2018-11-11 NOTE — ASSESSMENT & PLAN NOTE
43M with R MCA M1 LVO s/p tPA and trombectomy (11/9) now with MRI showing R MCA distribution AIS.    No acute events overnight. Pt remains neurologically stable on exam. [id number].    --Continue care per primary team.  --Continue hourly neuromonitoring, please call immediately with any changes in neurologic exam.  --Continue goal sodium 145-155.  --Continue to elevate head of bed 30-45 degrees at all times.  --We will continue to monitor closely, please contact us with any questions or concerns.

## 2018-11-12 PROBLEM — R47.1 DYSARTHRIA: Status: ACTIVE | Noted: 2018-11-12

## 2018-11-12 PROBLEM — E78.2 MIXED HYPERLIPIDEMIA: Status: ACTIVE | Noted: 2018-11-12

## 2018-11-12 PROBLEM — Z72.0 TOBACCO ABUSE: Status: ACTIVE | Noted: 2018-11-09

## 2018-11-12 PROBLEM — F19.10: Status: ACTIVE | Noted: 2018-11-12

## 2018-11-12 LAB
ALBUMIN SERPL BCP-MCNC: 3.4 G/DL
ALP SERPL-CCNC: 77 U/L
ALT SERPL W/O P-5'-P-CCNC: 15 U/L
ANA SER QL IF: NORMAL
ANION GAP SERPL CALC-SCNC: 9 MMOL/L
ASCENDING AORTA: 3.09 CM
AST SERPL-CCNC: 13 U/L
AV MEAN GRADIENT: 6.1 MMHG
AV PEAK GRADIENT: 9.12 MMHG
AV VALVE AREA: 2.36 CM2
BASOPHILS # BLD AUTO: 0.03 K/UL
BASOPHILS NFR BLD: 0.3 %
BILIRUB SERPL-MCNC: 0.9 MG/DL
BSA FOR ECHO PROCEDURE: 2.22 M2
BUN SERPL-MCNC: 14 MG/DL
CALCIUM SERPL-MCNC: 9.3 MG/DL
CHLORIDE SERPL-SCNC: 104 MMOL/L
CO2 SERPL-SCNC: 24 MMOL/L
CREAT SERPL-MCNC: 0.8 MG/DL
CV ECHO LV RWT: 0.54 CM
DIFFERENTIAL METHOD: ABNORMAL
DOP CALC AO PEAK VEL: 1.51 M/S
DOP CALC AO VTI: 18.88 CM
DOP CALC LVOT AREA: 3.11 CM2
DOP CALC LVOT DIAMETER: 1.99 CM
DOP CALC LVOT STROKE VOLUME: 44.55 CM3
DOP CALCLVOT PEAK VEL VTI: 14.33 CM
ECHO LV POSTERIOR WALL: 1.33 CM (ref 0.6–1.1)
EOSINOPHIL # BLD AUTO: 0 K/UL
EOSINOPHIL NFR BLD: 0.3 %
ERYTHROCYTE [DISTWIDTH] IN BLOOD BY AUTOMATED COUNT: 16.7 %
EST. GFR  (AFRICAN AMERICAN): >60 ML/MIN/1.73 M^2
EST. GFR  (NON AFRICAN AMERICAN): >60 ML/MIN/1.73 M^2
FRACTIONAL SHORTENING: 48 % (ref 28–44)
GLUCOSE SERPL-MCNC: 110 MG/DL
HCT VFR BLD AUTO: 36.9 %
HGB BLD-MCNC: 12.3 G/DL
IMM GRANULOCYTES # BLD AUTO: 0.01 K/UL
IMM GRANULOCYTES NFR BLD AUTO: 0.1 %
INTERVENTRICULAR SEPTUM: 1.4 CM (ref 0.6–1.1)
LA MAJOR: 5.61 CM
LA MINOR: 5.27 CM
LA WIDTH: 2.88 CM
LEFT ATRIUM SIZE: 3.77 CM
LEFT ATRIUM VOLUME INDEX: 22.6 ML/M2
LEFT ATRIUM VOLUME: 50.16 CM3
LEFT INTERNAL DIMENSION IN SYSTOLE: 2.54 CM (ref 2.1–4)
LEFT VENTRICLE DIASTOLIC VOLUME INDEX: 51.66 ML/M2
LEFT VENTRICLE DIASTOLIC VOLUME: 114.68 ML
LEFT VENTRICLE MASS INDEX: 123.8 G/M2
LEFT VENTRICLE SYSTOLIC VOLUME INDEX: 10.5 ML/M2
LEFT VENTRICLE SYSTOLIC VOLUME: 23.2 ML
LEFT VENTRICULAR INTERNAL DIMENSION IN DIASTOLE: 4.93 CM (ref 3.5–6)
LEFT VENTRICULAR MASS: 274.85 G
LYMPHOCYTES # BLD AUTO: 1.7 K/UL
LYMPHOCYTES NFR BLD: 20 %
MAGNESIUM SERPL-MCNC: 2.3 MG/DL
MCH RBC QN AUTO: 25.4 PG
MCHC RBC AUTO-ENTMCNC: 33.3 G/DL
MCV RBC AUTO: 76 FL
MONOCYTES # BLD AUTO: 0.7 K/UL
MONOCYTES NFR BLD: 8.6 %
NEUTROPHILS # BLD AUTO: 6.1 K/UL
NEUTROPHILS NFR BLD: 70.7 %
NRBC BLD-RTO: 0 /100 WBC
PHOSPHATE SERPL-MCNC: 4.1 MG/DL
PLATELET # BLD AUTO: 252 K/UL
PMV BLD AUTO: 12.1 FL
POCT GLUCOSE: 100 MG/DL (ref 70–110)
POCT GLUCOSE: 90 MG/DL (ref 70–110)
POTASSIUM SERPL-SCNC: 4.1 MMOL/L
PROT SERPL-MCNC: 6.9 G/DL
RA MAJOR: 3.84 CM
RA WIDTH: 2.77 CM
RBC # BLD AUTO: 4.84 M/UL
RIGHT VENTRICULAR END-DIASTOLIC DIMENSION: 3.76 CM
RV TISSUE DOPPLER FREE WALL SYSTOLIC VELOCITY 1 (APICAL 4 CHAMBER VIEW): 20 M/S
SINUS: 3.44 CM
SODIUM SERPL-SCNC: 137 MMOL/L
STJ: 3.15 CM
TDI LATERAL: 0.09
TDI SEPTAL: 0.08
TDI: 0.09
TRICUSPID ANNULAR PLANE SYSTOLIC EXCURSION: 2.29 CM
WBC # BLD AUTO: 8.61 K/UL

## 2018-11-12 PROCEDURE — 99233 SBSQ HOSP IP/OBS HIGH 50: CPT | Mod: ,,, | Performed by: PSYCHIATRY & NEUROLOGY

## 2018-11-12 PROCEDURE — 25000003 PHARM REV CODE 250: Performed by: PSYCHIATRY & NEUROLOGY

## 2018-11-12 PROCEDURE — 25000003 PHARM REV CODE 250: Performed by: STUDENT IN AN ORGANIZED HEALTH CARE EDUCATION/TRAINING PROGRAM

## 2018-11-12 PROCEDURE — 25000242 PHARM REV CODE 250 ALT 637 W/ HCPCS: Performed by: STUDENT IN AN ORGANIZED HEALTH CARE EDUCATION/TRAINING PROGRAM

## 2018-11-12 PROCEDURE — 86147 CARDIOLIPIN ANTIBODY EA IG: CPT | Mod: 59

## 2018-11-12 PROCEDURE — 63600175 PHARM REV CODE 636 W HCPCS: Performed by: PSYCHIATRY & NEUROLOGY

## 2018-11-12 PROCEDURE — 20000000 HC ICU ROOM

## 2018-11-12 PROCEDURE — 94761 N-INVAS EAR/PLS OXIMETRY MLT: CPT

## 2018-11-12 PROCEDURE — 85025 COMPLETE CBC W/AUTO DIFF WBC: CPT

## 2018-11-12 PROCEDURE — 80053 COMPREHEN METABOLIC PANEL: CPT

## 2018-11-12 PROCEDURE — 97530 THERAPEUTIC ACTIVITIES: CPT

## 2018-11-12 PROCEDURE — 92526 ORAL FUNCTION THERAPY: CPT

## 2018-11-12 PROCEDURE — 99233 SBSQ HOSP IP/OBS HIGH 50: CPT | Mod: ,,, | Performed by: NEUROLOGICAL SURGERY

## 2018-11-12 PROCEDURE — 85613 RUSSELL VIPER VENOM DILUTED: CPT

## 2018-11-12 PROCEDURE — 83090 ASSAY OF HOMOCYSTEINE: CPT

## 2018-11-12 PROCEDURE — 81240 F2 GENE: CPT

## 2018-11-12 PROCEDURE — 92610 EVALUATE SWALLOWING FUNCTION: CPT

## 2018-11-12 PROCEDURE — 83021 HEMOGLOBIN CHROMOTOGRAPHY: CPT

## 2018-11-12 PROCEDURE — 94640 AIRWAY INHALATION TREATMENT: CPT

## 2018-11-12 PROCEDURE — 83695 ASSAY OF LIPOPROTEIN(A): CPT

## 2018-11-12 PROCEDURE — 83735 ASSAY OF MAGNESIUM: CPT

## 2018-11-12 PROCEDURE — 25000003 PHARM REV CODE 250: Performed by: NURSE PRACTITIONER

## 2018-11-12 PROCEDURE — 97110 THERAPEUTIC EXERCISES: CPT

## 2018-11-12 PROCEDURE — 86146 BETA-2 GLYCOPROTEIN ANTIBODY: CPT | Mod: 59

## 2018-11-12 PROCEDURE — 84100 ASSAY OF PHOSPHORUS: CPT

## 2018-11-12 RX ORDER — ASPIRIN 81 MG/1
81 TABLET ORAL DAILY
Status: DISCONTINUED | OUTPATIENT
Start: 2018-11-12 | End: 2018-11-12

## 2018-11-12 RX ORDER — NAPROXEN SODIUM 220 MG/1
81 TABLET, FILM COATED ORAL DAILY
Status: DISCONTINUED | OUTPATIENT
Start: 2018-11-12 | End: 2018-11-22 | Stop reason: HOSPADM

## 2018-11-12 RX ORDER — IPRATROPIUM BROMIDE AND ALBUTEROL SULFATE 2.5; .5 MG/3ML; MG/3ML
3 SOLUTION RESPIRATORY (INHALATION)
Status: DISCONTINUED | OUTPATIENT
Start: 2018-11-12 | End: 2018-11-22 | Stop reason: HOSPADM

## 2018-11-12 RX ADMIN — HEPARIN SODIUM 5000 UNITS: 5000 INJECTION, SOLUTION INTRAVENOUS; SUBCUTANEOUS at 02:11

## 2018-11-12 RX ADMIN — ATORVASTATIN CALCIUM 40 MG: 20 TABLET, FILM COATED ORAL at 08:11

## 2018-11-12 RX ADMIN — IPRATROPIUM BROMIDE AND ALBUTEROL SULFATE 3 ML: .5; 3 SOLUTION RESPIRATORY (INHALATION) at 08:11

## 2018-11-12 RX ADMIN — IPRATROPIUM BROMIDE AND ALBUTEROL SULFATE 3 ML: .5; 3 SOLUTION RESPIRATORY (INHALATION) at 10:11

## 2018-11-12 RX ADMIN — HYDRALAZINE HYDROCHLORIDE 50 MG: 50 TABLET ORAL at 02:11

## 2018-11-12 RX ADMIN — HEPARIN SODIUM 5000 UNITS: 5000 INJECTION, SOLUTION INTRAVENOUS; SUBCUTANEOUS at 09:11

## 2018-11-12 RX ADMIN — HYDRALAZINE HYDROCHLORIDE 50 MG: 50 TABLET ORAL at 09:11

## 2018-11-12 RX ADMIN — HEPARIN SODIUM 5000 UNITS: 5000 INJECTION, SOLUTION INTRAVENOUS; SUBCUTANEOUS at 05:11

## 2018-11-12 RX ADMIN — HYDRALAZINE HYDROCHLORIDE 50 MG: 50 TABLET ORAL at 05:11

## 2018-11-12 RX ADMIN — AMLODIPINE BESYLATE 10 MG: 10 TABLET ORAL at 08:11

## 2018-11-12 RX ADMIN — LISINOPRIL 40 MG: 20 TABLET ORAL at 08:11

## 2018-11-12 RX ADMIN — ASPIRIN 81 MG CHEWABLE TABLET 81 MG: 81 TABLET CHEWABLE at 02:11

## 2018-11-12 NOTE — ASSESSMENT & PLAN NOTE
-Goal SBP < 160   -Echo w/ normal LVEF  -Continue amlodipine 10 mg qd  -Continue lisinopril 40 mg qd  -Continue hydralazine 50 mg q8h

## 2018-11-12 NOTE — PLAN OF CARE
right M1 occlusion s/p tPA and thrombectomy.  On hemicraniectomy watch.   Repeat CT head in am to pace cerebral edema.   Stroke risk factors - hypertension.  Normal LA diameter   hypercoaguable work up  PT/OT  Tolerating po intake.   Echo Bubble study  LED neg for DVT

## 2018-11-12 NOTE — ASSESSMENT & PLAN NOTE
-Continue q1h neuro checks  -Hemicrani watch, bolus HTS for exam change  -NSGY following, appreciate recs

## 2018-11-12 NOTE — PT/OT/SLP PROGRESS
Occupational Therapy   Treatment    Name: Tyree Pablo  MRN: [id number]  Admitting Diagnosis:  Embolic stroke involving right middle cerebral artery       Recommendations:     Discharge Recommendations: rehabilitation facility      Subjective   Pt with no complaints.  Communicated with: RN prior to session.  Pain/Comfort:  · Pain Rating 1: 0/10  · Pain Rating Post-Intervention 1: 0/10    Patients cultural, spiritual, L[employer] day conflicts given the current situation: Mormon    Objective:     Patient found with: arterial line, blood pressure cuff, Condom Catheter, pulse ox (continuous), telemetry, SCD, peripheral IV, pressure relief boots    General Precautions: Standard, aspiration, fall, pureed diet     Occupational Performance:    Bed Mobility:    · Patient completed Rolling/Turning to Right with moderate assistance  · Patient completed Scooting/Bridging with maximal assistance scooting forward on EOB; Max-Total (A) with scooting up hOB while supine  · Patient completed Supine to Sit with moderate assistance  · Patient completed Sit to Supine with moderate assistance     Activities of Daily Living:  · Grooming: moderate assistance while seated EOB    Patient left supine with all lines intact, call button in reach, RN notified, mother present and white board updated.    [facility] 6 Click:  [facility] Total Score: 10    Treatment & Education:  Pt required CGA-Min (A) postural control while seated EOB.  Provided verbal & physical cues to facilitate postural control while seated EOB.  Pt performed visual scanning to the left activities while seated EOB.  Provided cues to address techniques to decrease posterior & leftward leaning.  Provided LUE weight bearing while seated EOB.  Provided LUE PROM in all available planes x 10 reps each while supine.  Provided education to pt & pt's mother regarding therapy POC, pt deficits, & techniques to facilitate improvement in deficits.  Provided elevated positioning of LUE for shoulder  approximation at end of session. Pt had no further questions & when asked whether there were any concerns pt reported none.    Education:    Assessment:     Tyree Pablo is a 43 y.o. male with a medical diagnosis of Embolic stroke involving right middle cerebral artery.  He presents with fair participation and motivation.  Pt lethargic at end of session.  Pt is at risk for falls.  Pt continues to require (A) with all ADL's.  Performance deficits affecting function are weakness, impaired endurance, impaired sensation, impaired self care skills, impaired functional mobilty, impaired balance, visual deficits, impaired cognition, decreased lower extremity function, decreased upper extremity function, decreased coordination, decreased safety awareness, decreased ROM, impaired coordination, abnormal tone, impaired fine motor, impaired cardiopulmonary response to activity.      Rehab Prognosis:  fair; patient would benefit from acute skilled OT services to address these deficits and reach maximum level of function.       Plan:     Patient to be seen 4 x/week to address the above listed problems via self-care/home management, therapeutic activities, therapeutic exercises, sensory integration, cognitive retraining, neuromuscular re-education  · Plan of Care Expires: 12/08/18  · Plan of Care Reviewed with: patient, mother    This Plan of care has been discussed with the patient who was involved in its development and understands and is in agreement with the identified goals and treatment plan    GOALS:   Multidisciplinary Problems     Occupational Therapy Goals        Problem: Occupational Therapy Goal    Goal Priority Disciplines Outcome Interventions   Occupational Therapy Goal     OT, PT/OT Ongoing (interventions implemented as appropriate)    Description:  Goals set 11/10 to be addressed for 7 days with expiration date, 11/17:  Patient will increase functional independence with ADLs by performing:    Patient will  demonstrate rolling to the right with min assist.  Not met   Patient will demonstrate rolling to the left with modified independence.   Not met  Patient will demonstrate supine -sit with SBA.   Not met  Patient will demonstrate stand pivot transfers with min assist.   Not met  Patient will demonstrate grooming while standing with min assist.   Not met  Patient will demonstrate upper body dressing with mod assist while seated EOB.   Not met  Patient will demonstrate lower body dressing with mod assist while seated EOB.   Not met  Patient will demonstrate toileting with mod assist.   Not met  Patient's family / caregiver will demonstrate independence and safety with assisting patient with self-care skills and functional mobility.     Not met  Patient and/or patient's family will verbalize understanding of stroke prevention guidelines, personal risk factors and stroke warning signs via teachback method.  Not met                           Time Tracking:     OT Date of Treatment: 11/12/18  OT Start Time: 0923  OT Stop Time: 0950  OT Total Time (min): 27 min    Billable Minutes:Therapeutic Activity 15  Therapeutic Exercise 12    GABRIELLE Walden  11/12/2018

## 2018-11-12 NOTE — PLAN OF CARE
SW met with Pt and Pt wife at bedside. Discussed therapy recs for rehab and provided list. Family to review and give choices asap.    Marysol Kim LMSW  Neurocritical Care   [facility]  [id number]

## 2018-11-12 NOTE — ASSESSMENT & PLAN NOTE
43 y.o. male with significant past medical history of obesity, HTN, tobacco abuse who was transferred from [facility] after receiving IV-tPA for acute left sided weakness.  Found to have R M1 occlusion of the right middle cerebral artery and taken to interventional radiology for thrombectomy.  Patient had successful thromboaspiration with TICI 3 reperfusion       MRI brain showed large right middle cerebral artery territory infarct.  Mild petechial hemorrhage.  Utox for multiple drugs of abuse including THC with amphetamine.  Amphetamines can cause abnormal heart rhythms and could be the etiology of stroke.  Patient does have other risk factors such as family history of early stroke, obesity, tobacco abuse, hypertension.      Antithrombotics for secondary stroke prevention: ASA 81mg qd     Statins for secondary stroke prevention and hyperlipidemia, if present:   Statins: Atorvastatin- 40 mg daily     Aggressive risk factor modification: HTN, Smoking, Obesity, Drug use     Rehab efforts: PT/OT/SLP to evaluate and treat inpatient rehab     Diagnostics ordered/pending: hypercoagulable panel, TTE with bubble, serial CTs     VTE prophylaxis: SCDs. Heparin 5000 units q 8hours     BP parameters: Infarct: Post tPA, SBP <180
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
## 2018-11-12 NOTE — ASSESSMENT & PLAN NOTE
-Area of cytotoxic cerebral edema identified when reviewing brain imaging in the territory of the right middle cerebral artery. There is no mass effect associated with it. We will continue to monitor the patients clinical exam for any worsening of symptoms which may indicate expansion of the stroke or the area of the edema resulting in the clinical change. The pattern is suggestive of embolic etiology.    Patient on hemicrani watch

## 2018-11-12 NOTE — PT/OT/SLP EVAL
"Speech Language Pathology Evaluation  Cognitive Communication    Patient Name:  Tyree Pablo   MRN:  [id number]  Admitting Diagnosis: Embolic stroke involving right middle cerebral artery    Recommendations:     Recommendations:                General Recommendations:  Dysphagia therapy and Cognitive-linguistic therapy  Diet recommendations:  Dental Soft, Thin   Aspiration Precautions: Alternating bites/sips, Check for pocketing/oral residue and Standard aspiration precautions   General Precautions: Standard, aspiration, fall, pureed diet  Communication strategies:  provide increased time to answer and go to room if call light pushed    History:     Past Medical History:   Diagnosis Date    Acute left-sided weakness 11/8/2018    Hypertension        History reviewed. No pertinent surgical history.    Social History: Patient lives with son.     Prior diet: regular / thin liquids.     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  Patient also reported fishing as a hobby.       Subjective     Awake & alert, mother at bedside for session.     Pain/Comfort:  · Pain Rating 1: 0/10    Objective:   Cognitive Status:    Arousal/Alertness Inconsistent responses noted throughout eval.   Attention Sustained attention deficit noted throughout evaluation. patient benefittef from reps of stim and instruction for completion of task.   Perseveration Not present  Orientation Oriented x4  Memory Immediate Recall WFL, Delayed recall WFL as patient recalled 3/3 unassociated words after 1 min filled delay.  and recall general information WFL  Problem Solving Sequencing completed with 0% acc indep. , Solutions to hypothetical situations completed with 100% acc indep.  and Compare/contrast compelted with 100% acc.       Receptive Language:   Comprehension:      Questions Complex yes/no WFL  Commands  multistep basic commands WFL    Expressive Language:  Verbal:    Repetition Words 100% acc, Phrases " 100% acc and Sentences with 60% acc  Naming Confrontation WFL and Single word responsive naming WFL  Conversational speech WFL      Motor Speech:  Dysarthria mild. patient presents with ~80-90% intelligible during decontextualized conversational discourse.     Voice:   WFL    Visual-Spatial:  Left Neglectnoted. patient did not attend to ST when positioned on the L and did not visually track ST to midline given cues.     Reading:   did not assess     Written Expression:   did not assess    Treatment: Patient tolerated thin liquids via straw sips x5 along with solid trials with no overt signs of airway compromise. Patient unable to complete lingual sweep of L buccal cavity, though cavity clear via finger sweep completed by ST. No pocketing evident. Patient appearing appropriate for diet advancement to dental soft consistencies with continued thin liquids. Patient and mother were provided skilled education regarding diet recommendations, aspiration precautions, as well as general goals/objectives/targets of ST POC. Patient and mother verbalized understanding.     Assessment:   Tyree Pablo is a 43 y.o. male with an SLP diagnosis of Dysphagia, Dysarthria, Cognitive-Linguistic Impairment and Visio-Spatial Impairment.      Goals:   Multidisciplinary Problems     SLP Goals        Problem: SLP Goal    Goal Priority Disciplines Outcome   SLP Goal     SLP    Description:  Speech Language Pathology Goals  Goals expected to be met by 11/19:  1. Patient will tolerate a dental soft diet and thin liquids with no overt signs of airway compromise.   2. Patient will complete OME's x10 each for improved lingual/labial strength and ROM.  3. Patient will demonstrate sustained attention to therapy tasks for 2 min with no cues.   4. Patient will provide 12+ items within concrete category in 1 minute indep.   5. Patient will complete mental manipulation tasks with 80% acc indep.   6. Patient will complete high level problem  solving/reasoning tasks with 80% acc indep.   7. Patient will complete visual spatial/tracking tasks to improve left neglect given mod A.   7. Patient will participate in further assessments regarding reading and writing.       Goals due 11/16  1.  Tolerate trials of [facility] soft diet with thin liquids with no s/s of aspiration  2.  Participate in speech language evaluation                     Plan:   · Patient to be seen:  4 x/week   · Plan of Care expires:  12/07/18  · Plan of Care reviewed with:  patient, mother   · SLP Follow-Up:  Yes       Discharge recommendations:  Discharge Facility/Level Of Care Needs: rehabilitation facility   Barriers to Discharge:  None    Time Tracking:   SLP Treatment Date:   11/12/18  Speech Start Time:  1045  Speech Stop Time:  1101     Speech Total Time (min):  16 min    Billable Minutes: Eval 8  and Treatment Swallowing Dysfunction 8    Emily P. Abadie M.S., CCC-SLP  Speech Language Pathologist  [phone]  11/12/2018

## 2018-11-12 NOTE — HOSPITAL COURSE
43 y.o. male with significant past medical history of obesity, HTN, tobacco abuse who was transferred from [facility] after receiving IV-tPA for acute left sided weakness.  Found to have R M1 occlusion of the right middle cerebral artery and taken to interventional radiology for thrombectomy.  Patient had successful thromboaspiration with TICI 3 reperfusion      MRI brain showed large right middle cerebral artery territory infarct.  Mild petechial hemorrhage.  Etiology ESUS.  Utox for multiple drugs of abuse including THC with amphetamine.  Amphetamines can cause abnormal heart rhythms and could be the etiology of stroke.  Patient does have other risk factors such as family history of early stroke, obesity, tobacco abuse, hypertension.  Echo with bubble 11/12: negative for intracardiac shunt.  He was started on ASA 81mg qd and atorvastatin 80mg qhs for secondary stroke prevention. Patient needs 30 day event monitor to investigate for occult atrial fibrillation and follow up with hypercoagulable panel.  He will follow up with PCP within 10 days of hospital discharge and in 4-6 weeks stroke clinic.      11/12/18 Investigating etiologies of stroke.  Hypercoagulable and echo with bubble ordered. Patient utox + for amphetamines, THC, benzo, opiates.     11/13/18 NAEON patient neurologically stable.  Repeat CT head showing 0.2 cm midline shift and mass effect.  NCC will continue to monitor cerebral edema.  11/14/18 Patient experiencing angioedema from lisinopril.  Will stay in in the ICU for intubation watch.  IV benadryl and solumedrol ordered   11/15/18 angioedema improved with steroids and benadryl  11/16/18. Patient neurologically stable on exam with continued left sided weakness. -191 and HR  in last 24 hour. Discussed with Dr. Dunbar, continue amlodipine 10mg and increase hydralazine to 150mg TID. Plan to increase Lipitor to 80mg daily per Dr. Kapoor since LDL 14.  hypercoagulable  panel overall negative, Lipoprotein A elevated at 134. Pending Rehab placement.   11/17/2018 Patient w/ new leukocytosis noted and increased temp and swelling in the LUE; US ordered to evaluate the vasculature. No other complaints at this time. Awaiting placement to Rehab. Starting Fluoxetine 10 mg QD  11/18/2018 No DVT on US. Patient doing well. Shoulder pain relieved w/ Tylenol PRN  11/19/18 Neurologically stable.  Awaiting placement to inpatient rehab. Patient is agreeable to go to substance abuse program after inpatient rehab.    11/20/18 MAKENZIE.  Waiting for placement.  More information given for substance abuse program and does not want to pursue treatment   11/22: Added Plavix to med regimen. Pt d/c'd to inpatient rehab today.

## 2018-11-12 NOTE — PROGRESS NOTES
[facility]  Vascular Neurology  Comprehensive Stroke Center  Progress Note    Assessment/Plan:     * Embolic stroke involving right middle cerebral artery    43 y.o. male with significant past medical history of obesity, HTN, tobacco abuse who was transferred from [facility] after receiving IV-tPA for acute left sided weakness.  Found to have R M1 occlusion of the right middle cerebral artery and taken to interventional radiology for thrombectomy.  Patient had successful thromboaspiration        MRI brain showed large right middle cerebral artery territory infarct.  Mild petechial hemorrhage.  Utox for multiple drugs of abuse including THC with amphetamine.  Amphetamines can cause abnormal heart rhythms and could be the etiology of stroke.  Patient does have other risk factors such as family history of early stroke, obesity, tobacco abuse, hypertension.      Antithrombotics for secondary stroke prevention:start ASA 81mg qd     Statins for secondary stroke prevention and hyperlipidemia, if present:   Statins: Atorvastatin- 40 mg daily     Aggressive risk factor modification: HTN, Smoking, Obesity, Drug use     Rehab efforts: PT/OT/SLP to evaluate and treat inpatient rehab     Diagnostics ordered/pending: hypercoagulable panel, TTE with bubble, serial CTs     VTE prophylaxis: SCDs. Heparin 5000 units q 8hours     BP parameters: Infarct: Post tPA, SBP <180         Abuse, drug or alcohol    Positive Utox screen for THC, amphetamine, opioids, benzodiazepines   Etiology of stroke        Cytotoxic cerebral edema    -Area of cytotoxic cerebral edema identified when reviewing brain imaging in the territory of the right middle cerebral artery. There is no mass effect associated with it. We will continue to monitor the patients clinical exam for any worsening of symptoms which may indicate expansion of the stroke or the area of the edema resulting in the clinical change. The  pattern is suggestive of embolic etiology.    Patient on hemicrani watch         Essential hypertension    -Stroke risk factor.  SBP<180.  -Resume home meds when appropriate.     Dysarthria    Due to stroke   SLP ordered      Tobacco abuse    -Stroke risk factor.  Encourage cessation.  -Nicotine patch prn.     S/P admn tPA in diff fac w/n last 24 hr bef adm to crnt fac    -Admitted to [facility] for close monitoring.     Acute left-sided weakness    Due stroke  PT, OT ordered       mixed hyperlipidemia  Risk factor for stroke     Atorvastatin 40mg qhs      11/12/18 Investigating etiologies of stroke.  Hypercoagulable and echo with bubble ordered. Patient utox + for amphetamines, THC, benzo, opiates.       STROKE DOCUMENTATION   Acute Stroke Times   Last Known Normal Date: 11/08/18  Last Known Normal Time: 1930  Symptom Onset Date: 11/08/18  Symptom Onset Time: 2015  Stroke Team Called Date: 11/08/18  Stroke Team Called Time: 2212  Stroke Team Arrival Date: 11/08/18  Stroke Team Arrival Time: 2213  CT Interpretation Time: 2213(no acute findings)  Decision to Treat Time for Alteplase: 2228(tPA administered)  Decision to Treat Time for IR: 0347(no intervention at this time)    NIH Scale:  1a. Level Of Consciousness: 0-->Alert: keenly responsive  1b. LOC Questions: 0-->Answers both questions correctly  1c. LOC Commands: 0-->Performs both tasks correctly  2. Best Gaze: 1-->Partial gaze palsy: gaze is abnormal in one or both eyes, but forced deviation or total gaze paresis is not present  3. Visual: 2-->Complete hemianopia  4. Facial Palsy: 1-->Minor paralysis (flattened nasolabial fold, asymmetry on smiling)  5a. Motor Arm, Left: 4-->No movement  5b. Motor Arm, Right: 0-->No drift: limb holds 90 (or 45) degrees for full 10 secs  6a. Motor Leg, Left: 4-->No movement(has tone )  6b. Motor Leg, Right: 0-->No drift: leg holds 30 degree position for full 5 secs  7. Limb Ataxia: 0-->Absent  8. Sensory: 2-->Severe to total  sensory loss: patient is not aware of being touched in the face, arm, and leg  9. Best Language: 1-->Mild-to-moderate aphasia: some obvious loss of fluency or facility of comprehension, without significant limitation on ideas expressed or form of expression. Reduction of speech and/or comprehension, however, makes conversation. . . (see row details)  10. Dysarthria: 1-->Mild-to-moderate dysarthria: patient slurs at least some words and, at worst, can be understood with some difficulty  11. Extinction and Inattention (formerly Neglect): 2-->Profound rito-inattention/extinction more than 1 modality  Total (NIH Stroke Scale): 18       Modified Danilo Score: 0  [place] Coma Scale:    ABCD2 Score:    [id number] Score:   HAS -BLED Score:   ICH Score:   [employer] Classification:      Hemorrhagic change of an Ischemic Stroke: Does this patient have an ischemic stroke with hemorrhagic changes? Yes, Grading Scale: PH Type 1 (PH-1) = hematoma in < 30% of the infarcted area with some slight space-occupying effect. Is this a symptomatic change?  No - Hemorrhage is not clinically significant     Neurologic Chief Complaint: right MCA infarct    Subjective:     Interval History: Patient is seen for follow-up neurological assessment and treatment recommendations:Investigating etiologies of stroke.  Hypercoagulable and echo with bubble ordered. Patient utox + for amphetamines, THC, benzo, opiates.     HPI, Past Medical, Family, and Social History remains the same as documented in the initial encounter.     Review of Systems   Constitutional: Negative for chills and fever.   HENT: Positive for congestion. Negative for drooling.    Respiratory: Negative for cough and shortness of breath.    Gastrointestinal: Negative for diarrhea and vomiting.   Skin: Negative for pallor and rash.   Psychiatric/Behavioral: Negative for agitation and behavioral problems.     Scheduled Meds:   albuterol-ipratropium  3 mL Nebulization Q6H WAKE     amLODIPine  10 mg Oral Daily    atorvastatin  40 mg Oral Daily    heparin (porcine)  5,000 Units Subcutaneous Q8H    hydrALAZINE  50 mg Oral Q8H    lisinopril  40 mg Oral Daily     Continuous Infusions:  PRN Meds:dextrose 50%, glucagon (human recombinant), hydrALAZINE, lidocaine (PF) 10 mg/ml (1%), magnesium oxide, magnesium oxide, potassium chloride 10%, potassium chloride 10%, potassium chloride 10%, potassium, sodium phosphates, potassium, sodium phosphates, potassium, sodium phosphates, sodium chloride 0.9%    Objective:     Vital Signs (Most Recent):  Temp: 98.5 °F (36.9 °C) (11/12/18 0700)  Pulse: 97 (11/12/18 1020)  Resp: 20 (11/12/18 1020)  BP: (!) 140/75 (11/11/18 0000)  SpO2: 97 % (11/12/18 1020)  BP Location: Left arm    Vital Signs Range (Last 24H):  Temp:  [98.4 °F (36.9 °C)-98.6 °F (37 °C)]   Pulse:  []   Resp:  []   SpO2:  [96 %-99 %]   Arterial Line BP: (106-158)/(64-88)   BP Location: Left arm    Physical Exam   Constitutional: He appears well-developed and well-nourished.   HENT:   Head: Normocephalic and atraumatic.   Eyes: Pupils are equal, round, and reactive to light.   Left gaze palsy    Cardiovascular: Normal rate and regular rhythm.   Pulmonary/Chest: No respiratory distress.   Abdominal: He exhibits no distension. There is no guarding.   Musculoskeletal: He exhibits no edema or deformity.       Neurological Exam:   LOC: alert  Attention Span: Good   Language: mild expressive aphasia  Articulation: mild dysarthria  Orientation: Person, Place, Time   Visual Fields: Hemianopsia left  EOM (CN III, IV, VI): Palsy CN III: left  Pupils (CN II, III): PERRL  Motor: Arm left  Plegia 0/5  Leg left  Paresis: 1/5  Arm right  Normal 5/5  Leg right Normal 5/5  Sensation: Yunior-anesthesia left  Tone:flaccid in left upper extremity     Laboratory:  CMP:   Recent Labs   Lab 11/12/18  0128   CALCIUM 9.3   ALBUMIN 3.4*   PROT 6.9      K 4.1   CO2 24      BUN 14   CREATININE 0.8    ALKPHOS 77   ALT 15   AST 13   BILITOT 0.9     CBC:   Recent Labs   Lab 11/12/18  0128   WBC 8.61   RBC 4.84   HGB 12.3*   HCT 36.9*      MCV 76*   MCH 25.4*   MCHC 33.3     Lipid Panel:   Recent Labs   Lab 11/09/18  0353   CHOL 201*   LDLCALC 141.4   HDL 35*   TRIG 123     Hgb A1C:   Recent Labs   Lab 11/09/18  0353   HGBA1C 5.9*     TSH:   Recent Labs   Lab 11/09/18  0353   TSH 1.560       Diagnostic Results     Brain Imaging   MRI brain 11/09/18    Large right MCA infarct   Cytotoxic cerebral edema  Petechial hemorrhage inside lesion     Vessel Imaging   Cerebral angiogram 11/09/18  Thromboaspiration of the m1 segment of right middle cerebral artery with TICI 3 reperfusion.      CTA MP 11/09/18  Abrupt distal m1 occlusion.    Cardiac Imaging   Echo 11/10/18  · Left ventricle ejection fraction is normal at 65%  · Normal LV diastolic function.  · RV systolic function is normal.  · Trace tricuspid regurgitation.  · Normal central venous pressure (3 mm Hg).  · No pericardial effusion.             Fabiana Michael PA-C  Comprehensive Stroke Center  Department of Vascular Neurology   [facility]

## 2018-11-12 NOTE — SUBJECTIVE & OBJECTIVE
Interval History:  >4 elements OR status of 3 inpatient conditions:  Last saw patient 11/09/18 am.  He seems somewhat more alert than when I initially saw him.  Still has a diffuse hemineglect of the entire L side without feeling in LUE, but with slight withdrawal to noxious stimuli in LLE that appears to be volitional rather than just stereotyped triple flexion.      Review of Systems   Constitutional: Positive for fatigue. Negative for chills and fever.   HENT: Negative for rhinorrhea and sneezing.    Eyes: Negative for photophobia and visual disturbance.   Respiratory: Negative for cough and shortness of breath.    Gastrointestinal: Negative for nausea and vomiting.   Musculoskeletal: Positive for gait problem. Negative for neck pain and neck stiffness.   Skin: Negative for rash and wound.   Neurological: Positive for facial asymmetry, weakness and numbness.   Hematological: Negative for adenopathy. Does not bruise/bleed easily.   Psychiatric/Behavioral: Negative for agitation and confusion.     Objective:     Vitals:  Temp: 98.5 °F (36.9 °C)  Pulse: 98  Rhythm: normal sinus rhythm  Resp: 20  SpO2: 96 %  O2 Device (Oxygen Therapy): room air    Temp  Min: 98.4 °F (36.9 °C)  Max: 98.7 °F (37.1 °C)  Pulse  Min: 85  Max: 101  Resp  Min: 18  Max: 101  SpO2  Min: 96 %  Max: 99 %    11/11 0701 - 11/12 0700  In: 950 [P.O.:450; I.V.:500]  Out: 1775 [Urine:1775]   Unmeasured Output  Urine Occurrence: 1  Stool Occurrence: 1     Physical Exam  General:  Well-developed, well-nourished, nad  HEENT:  NCAT, PERRLA, EOMI, oropharyngeal membranes non-erythematous/without exudate  Neck:  Supple, normal ROM without nuchal rigidity  Resp:  Symmetric expansion, no increased wob  CVS:  No LE edema, peripheral pulses 2+ (radial, dorsalis pedis)  GI:  Abd soft, non-distended, non-tender to palpation  Neurologic Exam:  Mental Status:  AAOx3.  Speech, thought content appropriate.   Cranial Nerves:  VFs intact on counting fingers in all  quadrants bilaterally, no clear visual field deficits.  PERRLA, EOMI.  Mild L lower facial droop.  Palate raises symmetrically, tongue protrudes midline. Trapezius 5/5 RUE, barely perceptible LUE.  +Visuospatial hemineglect of L.  Motor:  Normal bulk and tone RUE/RLE.  LUE flaccid. RUE shoulder abduction, biceps/triceps,  strength 5/5.  LUE without volitional movement or movement to noxious stimuli.  LLE with slight withdrawal to noxious stimuli--appears to have lateral movement with noxious stimuli to the knee, +grimace.  Sensory:  Denies sensation to LUE, no movement with noxious stimuli.  LLE with grimace to noxious stimuli.  +inattention to LUE, LLE on bilateral tactile stimulation.  Reflexes:  Biceps, brachioradialis, patellar 2+ and symmetric.  No ankle clonus. Equivocal toe bilaterally.  Coordination:  R FNF, RE, HTS intact with no dysmetria/ataxia/dysdiadochokinesia.  No resting tremor or ataxia--limited 2/2 weakness in LUE/LLE  Gait:  Deferred 2/2 weakness, fall risk.    Medications:  Continuous Scheduled  amLODIPine 10 mg Daily   atorvastatin 40 mg Daily   heparin (porcine) 5,000 Units Q8H   hydrALAZINE 50 mg Q8H   lisinopril 40 mg Daily   PRN  dextrose 50% 12.5 g PRN   glucagon (human recombinant) 1 mg PRN   hydrALAZINE 10 mg Q4H PRN   insulin aspart U-100 1-10 Units Q6H PRN   lidocaine (PF) 10 mg/ml (1%) 1 mL On Call Procedure   magnesium oxide 800 mg PRN   magnesium oxide 800 mg PRN   potassium chloride 10% 40 mEq PRN   potassium chloride 10% 40 mEq PRN   potassium chloride 10% 60 mEq PRN   potassium, sodium phosphates 2 packet PRN   potassium, sodium phosphates 2 packet PRN   potassium, sodium phosphates 2 packet PRN   sodium chloride 0.9% 5 mL PRN     Today I personally reviewed pertinent lines/drains/airways, imaging, cardiology results, laboratory results, microbiology results, notably:    Diet  Diet Dysphagia Pureed (IDDSI Level 4) Thin  Diet Dysphagia Pureed (IDDSI Level 4)  Thin    LDA:  18 R radial arterial line  18 PIV RUE  11/10/18 PICC R brachial  18 Condom catheter    Imagin18 CTA Stroke Multiphase:  Abrupt termination of the distal M1 segment of the right MCA, compatible with occlusion.  Associated hypodensity centered within the right sub insular region is compatible with early ischemic change of right MCA territory infarction.  No definite hemorrhage, noting the adjacent basal ganglia appear hyperdense adjacent to the involved hypodense white matter.  Diminutive caliber of vessels, as above, possibly related to vasospasm.    18 IR angiogram:  The left carotid bifurcation had no significant atherosclerotic disease.  Intracranial views demonstrated abrupt M1 segment occlusion about 1 cm beyond its origin with moderate collateral flow to the right MCA distal territory from the DAPHNE and PCA.  It was elected to perform mechanical thrombectomy.    18 MRI Brain w/o contrast:  Large right MCA territory distribution acute infarct without significant leftward midline shift.  There is scattered gradient susceptibility throughout the region of infarction, most notably within the right basal ganglia, suggestive of superimposed petechial hemorrhage.    Cardiology:  18 TTE w/ bubble study:  PENDING    11/10/18 TTE w/ CFD:  · Left ventricle ejection fraction is normal at 65%  · Normal LV diastolic function.  · RV systolic function is normal.  · Trace tricuspid regurgitation.  · Normal central venous pressure (3 mm Hg).  · No pericardial effusion.    Labs:  Recent Labs   Lab 18  0128   WBC 8.61   RBC 4.84   HGB 12.3*   HCT 36.9*      MCV 76*   MCH 25.4*   MCHC 33.3     Recent Labs   Lab 18  0128   CALCIUM 9.3   PROT 6.9      K 4.1   CO2 24      BUN 14   CREATININE 0.8   ALKPHOS 77   ALT 15   AST 13   BILITOT 0.9     Microbiology:  Microbiology Results (last 7 days)     ** No results found for the last 168 hours. **

## 2018-11-12 NOTE — PLAN OF CARE
11/12/18 1548   Discharge Reassessment   Assessment Type Discharge Planning Reassessment   Provided patient/caregiver education on the expected discharge date and the discharge plan Yes   Do you have any problems affording any of your prescribed medications? No   Discharge Plan A Rehab   Discharge Plan B Home with family   Patient choice form signed by patient/caregiver N/A   Anticipated Discharge Disposition Rehab   Can the patient answer the patient profile reliably? Yes, cognitively intact   How does the patient rate their overall health at the present time? Fair   Describe the patient's ability to walk at the present time. Major restrictions/daily assistance from another person   How often would a person be available to care for the patient? Whenever needed   Number of comorbid conditions (as recorded on the chart) One   During the past month, has the patient often been bothered by feeling down, depressed or hopeless? No   During the past month, has the patient often been bothered by little interest or pleasure in doing things? No   Post-Acute Status   Post-Acute Authorization Placement   Post-Acute Placement Status Patient List Provided       Patient not medically stable for discharge.    Sabrina Chaney RN, CCRN-K, [facility]  Neuro-Critical Care   [phone]

## 2018-11-12 NOTE — PLAN OF CARE
Problem: Patient Care Overview  Goal: Plan of Care Review  Outcome: Ongoing (interventions implemented as appropriate)  Outcome: Ongoing (interventions implemented as appropriate)  POC reviewed with pt and family at 0300. Pt was able to verbalize understanding; along with mother at bedside. Questions and concerns addressed. No acute events today.  Pt progressing toward goals. Will continue to monitor. See flowsheets for full assessment and VS info.      -Pt hemodynamically stable.   -No respiratory distress noted during shift.  -no gtts currently infusing.   -Condom catheter in place with adequate output.

## 2018-11-12 NOTE — PROGRESS NOTES
[facility]  Neurosurgery  Progress Note    Subjective:     History of Present Illness: Mr Pablo is a 43M with history of HTN that presented to [facility] 11/9 0500 s/p tPA for R M1 occlusion, was taken for thrombectomy with TICI 2B recanalization. 24 hour MRI after stroke showed R MCA territory stroke and early signs of edema. Patient denies anticoagulation/antiplatelet medications. Neurosurgery consulted for hemicraniectomy watch        Post-Op Info:  * No surgery found *             Medications:  Continuous Infusions:  Scheduled Meds:   amLODIPine  10 mg Oral Daily    atorvastatin  40 mg Oral Daily    heparin (porcine)  5,000 Units Subcutaneous Q8H    hydrALAZINE  50 mg Oral Q8H    lisinopril  40 mg Oral Daily     PRN Meds:dextrose 50%, glucagon (human recombinant), hydrALAZINE, insulin aspart U-100, lidocaine (PF) 10 mg/ml (1%), magnesium oxide, magnesium oxide, potassium chloride 10%, potassium chloride 10%, potassium chloride 10%, potassium, sodium phosphates, potassium, sodium phosphates, potassium, sodium phosphates, sodium chloride 0.9%     Review of Systems    Objective:     Weight: 107.5 kg (236 lb 15.9 oz)  Body mass index is 36.03 kg/m².  Vital Signs (Most Recent):  Temp: 98.5 °F (36.9 °C) (11/12/18 0700)  Pulse: 98 (11/12/18 0700)  Resp: 20 (11/12/18 0700)  BP: (!) 140/75 (11/11/18 0000)  SpO2: 96 % (11/12/18 0700) Vital Signs (24h Range):  Temp:  [98.4 °F (36.9 °C)-98.7 °F (37.1 °C)] 98.5 °F (36.9 °C)  Pulse:  [] 98  Resp:  [] 20  SpO2:  [96 %-99 %] 96 %  Arterial Line BP: (106-158)/(64-88) 135/78                      Male External Urinary Catheter 11/09/18 0715 Large (Active)   Collection Container Urimeter 11/11/2018  3:05 PM   Securement Method secured to top of thigh w/ adhesive device 11/11/2018  3:05 PM   Skin no redness;no breakdown 11/11/2018  3:05 PM   Tolerance no signs/symptoms of discomfort 11/11/2018  3:05 PM   Output (mL) 300 mL 11/11/2018  2:00 PM    Catheter Change Date 11/11/18 11/11/2018  3:05 PM   Catheter Change Time 1000 11/11/2018  3:05 PM       Neurosurgery Physical Exam    AAOx3  L facial droop, mild dysarthria  R gaze deviation, PERRL  RUE/RLE 5/5 throughout, follows commands  LUE/LLE plegic  No sensation LUE/LLE      Significant Labs:  Recent Labs   Lab 11/11/18  0151 11/12/18  0128    110    137   K 3.8 4.1    104   CO2 25 24   BUN 14 14   CREATININE 0.8 0.8   CALCIUM 8.9 9.3   MG 2.2 2.3     Recent Labs   Lab 11/11/18  0151 11/12/18  0128   WBC 8.10 8.61   HGB 12.0* 12.3*   HCT 36.8* 36.9*    252     No results for input(s): LABPT, INR, APTT in the last 48 hours.  Microbiology Results (last 7 days)     ** No results found for the last 168 hours. **        All pertinent labs from the last 24 hours have been reviewed.    Significant Diagnostics:  I have reviewed all pertinent imaging results/findings within the past 24 hours.    Assessment/Plan:     Cytotoxic cerebral edema    43M with R MCA M1 LVO s/p tPA and trombectomy (11/9) now with MRI showing R MCA distribution AIS.    No acute events overnight. Pt remains neurologically stable on exam. [id number].    --Continue care per primary team.  --Continue hourly neuromonitoring, please call immediately with any changes in neurologic exam.  --Continue goal sodium 145-155.  --Continue to elevate head of bed 30-45 degrees at all times.  --We will continue to monitor closely, please contact us with any questions or concerns.             Rakan Renteria MD  Neurosurgery  [facility]

## 2018-11-12 NOTE — PLAN OF CARE
Problem: SLP Goal  Goal: SLP Goal  Speech Language Pathology Goals  Goals expected to be met by 11/19:  1. Patient will tolerate a dental soft diet and thin liquids with no overt signs of airway compromise.   2. Patient will complete OME's x10 each for improved lingual/labial strength and ROM.  3. Patient will demonstrate sustained attention to therapy tasks for 2 min with no cues.   4. Patient will provide 12+ items within concrete category in 1 minute indep.   5. Patient will complete mental manipulation tasks with 80% acc indep.   6. Patient will complete high level problem solving/reasoning tasks with 80% acc indep.   7. Patient will complete visual spatial/tracking tasks to improve left neglect given mod A.   7. Patient will participate in further assessments regarding reading and writing.       Goals due 11/16  1.  Tolerate trials of [facility] soft diet with thin liquids with no s/s of aspiration  2.  Participate in speech language evaluation   Speech language and cognitive assessment completed with implemented plan of care. ST also recommend diet advancement to dental soft consistencies with continued thin liquids.   Emily P. Abadie M.S., CCC-SLP  Speech Language Pathologist  [phone]  11/12/2018

## 2018-11-12 NOTE — PLAN OF CARE
Problem: Occupational Therapy Goal  Goal: Occupational Therapy Goal  Goals set 11/10 to be addressed for 7 days with expiration date, 11/17:  Patient will increase functional independence with ADLs by performing:    Patient will demonstrate rolling to the right with min assist.  Not met   Patient will demonstrate rolling to the left with modified independence.   Not met  Patient will demonstrate supine -sit with SBA.   Not met  Patient will demonstrate stand pivot transfers with min assist.   Not met  Patient will demonstrate grooming while standing with min assist.   Not met  Patient will demonstrate upper body dressing with mod assist while seated EOB.   Not met  Patient will demonstrate lower body dressing with mod assist while seated EOB.   Not met  Patient will demonstrate toileting with mod assist.   Not met  Patient's family / caregiver will demonstrate independence and safety with assisting patient with self-care skills and functional mobility.     Not met  Patient and/or patient's family will verbalize understanding of stroke prevention guidelines, personal risk factors and stroke warning signs via teachback method.  Not met          Outcome: Ongoing (interventions implemented as appropriate)  Goals remain appropriate

## 2018-11-12 NOTE — SUBJECTIVE & OBJECTIVE
Interval History:    Off cardene on 3 po meds  Exam stable  Na normal       Objective:     Vitals:  Temp: 98.5 °F (36.9 °C)  Pulse: 95  Rhythm: normal sinus rhythm  Resp: 20  SpO2: 99 %  O2 Device (Oxygen Therapy): room air    Temp  Min: 98.5 °F (36.9 °C)  Max: 99.2 °F (37.3 °C)  Pulse  Min: 88  Max: 102  BP  Min: 137/70  Max: 140/75  MAP (mmHg)  Min: 96  Max: 103  Resp  Min: 18  Max: 30  SpO2  Min: 95 %  Max: 99 %    11/10 0701 - 11/11 0700  In: 1531.3 [P.O.:750; I.V.:781.3]  Out: 3150 [Urine:3150]   Unmeasured Output  Urine Occurrence: 1     Vital signs, lab studies, and imaging reviewed by me  Alert, oriented x3, R gaze pref crosses and regards past midline, sensory extinction on L, L hemiplegia Warm and well perfused, regular rate and rhythm, no murmurs  L ue edema from contrast extrav improving  Breathing comfortably, breath sounds clear  Belly soft, nontender, no hepatosplenomegaly    Medications:  Continuous Scheduled  amLODIPine 10 mg Daily   atorvastatin 40 mg Daily   heparin (porcine) 5,000 Units Q8H   hydrALAZINE 50 mg Q8H   lisinopril 40 mg Daily   PRN  dextrose 50% 12.5 g PRN   glucagon (human recombinant) 1 mg PRN   hydrALAZINE 10 mg Q4H PRN   insulin aspart U-100 1-10 Units Q6H PRN   lidocaine (PF) 10 mg/ml (1%) 1 mL On Call Procedure   magnesium oxide 800 mg PRN   magnesium oxide 800 mg PRN   potassium chloride 10% 40 mEq PRN   potassium chloride 10% 40 mEq PRN   potassium chloride 10% 60 mEq PRN   potassium, sodium phosphates 2 packet PRN   potassium, sodium phosphates 2 packet PRN   potassium, sodium phosphates 2 packet PRN   sodium chloride 0.9% 5 mL PRN     Diet  Diet Dysphagia Pureed (IDDSI Level 4) Thin  Diet Dysphagia Pureed (IDDSI Level 4) Thin

## 2018-11-12 NOTE — ASSESSMENT & PLAN NOTE
-s/p tPA, thrombectomy  -Vascular Neurology following, appreciate recs  -Goal SBP < 160--on lisinopril 40 mg qd, hydralazine 50 mg q8h, amlodipine 10 mg qd  -11/10/18 MRI Brain w/o contrast showing large R MCA stroke  -Hemicrani watch w/ NSGY following--recommending Na 145-155  -Exam largely unchanged, plan to bolus with hypertonic saline for exam worsening  -PT/OT/SLP following, appreciate recs  -Etiology still unclear--2D Echo w/ bubble contrast for PFO pending (No DVT on US BLE)   -Consider hypercoagulability panel

## 2018-11-12 NOTE — SUBJECTIVE & OBJECTIVE
Medications:  Continuous Infusions:  Scheduled Meds:   amLODIPine  10 mg Oral Daily    atorvastatin  40 mg Oral Daily    heparin (porcine)  5,000 Units Subcutaneous Q8H    hydrALAZINE  50 mg Oral Q8H    lisinopril  40 mg Oral Daily     PRN Meds:dextrose 50%, glucagon (human recombinant), hydrALAZINE, insulin aspart U-100, lidocaine (PF) 10 mg/ml (1%), magnesium oxide, magnesium oxide, potassium chloride 10%, potassium chloride 10%, potassium chloride 10%, potassium, sodium phosphates, potassium, sodium phosphates, potassium, sodium phosphates, sodium chloride 0.9%     Review of Systems    Objective:     Weight: 107.5 kg (236 lb 15.9 oz)  Body mass index is 36.03 kg/m².  Vital Signs (Most Recent):  Temp: 98.5 °F (36.9 °C) (11/12/18 0700)  Pulse: 98 (11/12/18 0700)  Resp: 20 (11/12/18 0700)  BP: (!) 140/75 (11/11/18 0000)  SpO2: 96 % (11/12/18 0700) Vital Signs (24h Range):  Temp:  [98.4 °F (36.9 °C)-98.7 °F (37.1 °C)] 98.5 °F (36.9 °C)  Pulse:  [] 98  Resp:  [] 20  SpO2:  [96 %-99 %] 96 %  Arterial Line BP: (106-158)/(64-88) 135/78                      Male External Urinary Catheter 11/09/18 0715 Large (Active)   Collection Container Urimeter 11/11/2018  3:05 PM   Securement Method secured to top of thigh w/ adhesive device 11/11/2018  3:05 PM   Skin no redness;no breakdown 11/11/2018  3:05 PM   Tolerance no signs/symptoms of discomfort 11/11/2018  3:05 PM   Output (mL) 300 mL 11/11/2018  2:00 PM   Catheter Change Date 11/11/18 11/11/2018  3:05 PM   Catheter Change Time 1000 11/11/2018  3:05 PM       Neurosurgery Physical Exam    AAOx3  L facial droop, mild dysarthria  R gaze deviation, PERRL  RUE/RLE 5/5 throughout, follows commands  LUE/LLE plegic  No sensation LUE/LLE      Significant Labs:  Recent Labs   Lab 11/11/18  0151 11/12/18  0128    110    137   K 3.8 4.1    104   CO2 25 24   BUN 14 14   CREATININE 0.8 0.8   CALCIUM 8.9 9.3   MG 2.2 2.3     Recent Labs   Lab  11/11/18  0151 11/12/18  0128   WBC 8.10 8.61   HGB 12.0* 12.3*   HCT 36.8* 36.9*    252     No results for input(s): LABPT, INR, APTT in the last 48 hours.  Microbiology Results (last 7 days)     ** No results found for the last 168 hours. **        All pertinent labs from the last 24 hours have been reviewed.    Significant Diagnostics:  I have reviewed all pertinent imaging results/findings within the past 24 hours.
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
## 2018-11-12 NOTE — SUBJECTIVE & OBJECTIVE
Neurologic Chief Complaint: right MCA infarct    Subjective:     Interval History: Patient is seen for follow-up neurological assessment and treatment recommendations:Investigating etiologies of stroke.  Hypercoagulable and echo with bubble ordered. Patient utox + for amphetamines, THC, benzo, opiates.     HPI, Past Medical, Family, and Social History remains the same as documented in the initial encounter.     Review of Systems   Constitutional: Negative for chills and fever.   HENT: Positive for congestion. Negative for drooling.    Respiratory: Negative for cough and shortness of breath.    Gastrointestinal: Negative for diarrhea and vomiting.   Skin: Negative for pallor and rash.   Psychiatric/Behavioral: Negative for agitation and behavioral problems.     Scheduled Meds:   albuterol-ipratropium  3 mL Nebulization Q6H WAKE    amLODIPine  10 mg Oral Daily    atorvastatin  40 mg Oral Daily    heparin (porcine)  5,000 Units Subcutaneous Q8H    hydrALAZINE  50 mg Oral Q8H    lisinopril  40 mg Oral Daily     Continuous Infusions:  PRN Meds:dextrose 50%, glucagon (human recombinant), hydrALAZINE, lidocaine (PF) 10 mg/ml (1%), magnesium oxide, magnesium oxide, potassium chloride 10%, potassium chloride 10%, potassium chloride 10%, potassium, sodium phosphates, potassium, sodium phosphates, potassium, sodium phosphates, sodium chloride 0.9%    Objective:     Vital Signs (Most Recent):  Temp: 98.5 °F (36.9 °C) (11/12/18 0700)  Pulse: 97 (11/12/18 1020)  Resp: 20 (11/12/18 1020)  BP: (!) 140/75 (11/11/18 0000)  SpO2: 97 % (11/12/18 1020)  BP Location: Left arm    Vital Signs Range (Last 24H):  Temp:  [98.4 °F (36.9 °C)-98.6 °F (37 °C)]   Pulse:  []   Resp:  []   SpO2:  [96 %-99 %]   Arterial Line BP: (106-158)/(64-88)   BP Location: Left arm    Physical Exam   Constitutional: He appears well-developed and well-nourished.   HENT:   Head: Normocephalic and atraumatic.   Eyes: Pupils are equal, round, and  reactive to light.   Left gaze palsy    Cardiovascular: Normal rate and regular rhythm.   Pulmonary/Chest: No respiratory distress.   Abdominal: He exhibits no distension. There is no guarding.   Musculoskeletal: He exhibits no edema or deformity.       Neurological Exam:   LOC: alert  Attention Span: Good   Language: mild expressive aphasia  Articulation: mild dysarthria  Orientation: Person, Place, Time   Visual Fields: Hemianopsia left  EOM (CN III, IV, VI): Palsy CN III: left  Pupils (CN II, III): PERRL  Motor: Arm left  Plegia 0/5  Leg left  Paresis: 1/5  Arm right  Normal 5/5  Leg right Normal 5/5  Sensation: Yunior-anesthesia left  Tone:flaccid in left upper extremity     Laboratory:  CMP:   Recent Labs   Lab 11/12/18  0128   CALCIUM 9.3   ALBUMIN 3.4*   PROT 6.9      K 4.1   CO2 24      BUN 14   CREATININE 0.8   ALKPHOS 77   ALT 15   AST 13   BILITOT 0.9     CBC:   Recent Labs   Lab 11/12/18  0128   WBC 8.61   RBC 4.84   HGB 12.3*   HCT 36.9*      MCV 76*   MCH 25.4*   MCHC 33.3     Lipid Panel:   Recent Labs   Lab 11/09/18  0353   CHOL 201*   LDLCALC 141.4   HDL 35*   TRIG 123     Hgb A1C:   Recent Labs   Lab 11/09/18  0353   HGBA1C 5.9*     TSH:   Recent Labs   Lab 11/09/18  0353   TSH 1.560       Diagnostic Results     Brain Imaging   MRI brain 11/09/18    Large right MCA infarct   Cytotoxic cerebral edema  Petechial hemorrhage inside lesion     Vessel Imaging   Cerebral angiogram 11/09/18  Thromboaspiration of the m1 segment of right middle cerebral artery with TICI 3 reperfusion.      CTA MP 11/09/18  Abrupt distal m1 occlusion.    Cardiac Imaging   Echo 11/10/18  · Left ventricle ejection fraction is normal at 65%  · Normal LV diastolic function.  · RV systolic function is normal.  · Trace tricuspid regurgitation.  · Normal central venous pressure (3 mm Hg).  · No pericardial effusion.

## 2018-11-13 LAB
ANION GAP SERPL CALC-SCNC: 9 MMOL/L
B2 GLYCOPROT1 IGA SER QL: <9 SAU
B2 GLYCOPROT1 IGG SER QL: <9 SGU
B2 GLYCOPROT1 IGM SER QL: <9 SMU
BASOPHILS # BLD AUTO: 0.03 K/UL
BASOPHILS NFR BLD: 0.4 %
BUN SERPL-MCNC: 20 MG/DL
CALCIUM SERPL-MCNC: 9.2 MG/DL
CARDIOLIPIN IGG SER IA-ACNC: <9.4 GPL
CARDIOLIPIN IGM SER IA-ACNC: <9.4 MPL
CHLORIDE SERPL-SCNC: 103 MMOL/L
CO2 SERPL-SCNC: 25 MMOL/L
CREAT SERPL-MCNC: 0.9 MG/DL
DIFFERENTIAL METHOD: ABNORMAL
EOSINOPHIL # BLD AUTO: 0 K/UL
EOSINOPHIL NFR BLD: 0.4 %
ERYTHROCYTE [DISTWIDTH] IN BLOOD BY AUTOMATED COUNT: 16.8 %
EST. GFR  (AFRICAN AMERICAN): >60 ML/MIN/1.73 M^2
EST. GFR  (NON AFRICAN AMERICAN): >60 ML/MIN/1.73 M^2
GLUCOSE SERPL-MCNC: 100 MG/DL
HCT VFR BLD AUTO: 39.1 %
HCYS SERPL-SCNC: 13.4 UMOL/L
HGB BLD-MCNC: 12.5 G/DL
IMM GRANULOCYTES # BLD AUTO: 0.02 K/UL
IMM GRANULOCYTES NFR BLD AUTO: 0.3 %
LA PPP-IMP: NEGATIVE
LPA SERPL-MCNC: 134 MG/DL (ref 0–30)
LYMPHOCYTES # BLD AUTO: 1.8 K/UL
LYMPHOCYTES NFR BLD: 24.3 %
MAGNESIUM SERPL-MCNC: 2.4 MG/DL
MCH RBC QN AUTO: 25.1 PG
MCHC RBC AUTO-ENTMCNC: 32 G/DL
MCV RBC AUTO: 79 FL
MONOCYTES # BLD AUTO: 0.6 K/UL
MONOCYTES NFR BLD: 7.9 %
NEUTROPHILS # BLD AUTO: 5 K/UL
NEUTROPHILS NFR BLD: 66.7 %
NRBC BLD-RTO: 0 /100 WBC
PHOSPHATE SERPL-MCNC: 4.3 MG/DL
PLATELET # BLD AUTO: 242 K/UL
PMV BLD AUTO: 11.5 FL
POTASSIUM SERPL-SCNC: 4.3 MMOL/L
RBC # BLD AUTO: 4.98 M/UL
SODIUM SERPL-SCNC: 137 MMOL/L
WBC # BLD AUTO: 7.56 K/UL

## 2018-11-13 PROCEDURE — 99233 SBSQ HOSP IP/OBS HIGH 50: CPT | Mod: ,,, | Performed by: PSYCHIATRY & NEUROLOGY

## 2018-11-13 PROCEDURE — 25000003 PHARM REV CODE 250: Performed by: STUDENT IN AN ORGANIZED HEALTH CARE EDUCATION/TRAINING PROGRAM

## 2018-11-13 PROCEDURE — 97112 NEUROMUSCULAR REEDUCATION: CPT

## 2018-11-13 PROCEDURE — 63600175 PHARM REV CODE 636 W HCPCS: Performed by: PSYCHIATRY & NEUROLOGY

## 2018-11-13 PROCEDURE — 94640 AIRWAY INHALATION TREATMENT: CPT

## 2018-11-13 PROCEDURE — 97535 SELF CARE MNGMENT TRAINING: CPT

## 2018-11-13 PROCEDURE — 80048 BASIC METABOLIC PNL TOTAL CA: CPT

## 2018-11-13 PROCEDURE — 84100 ASSAY OF PHOSPHORUS: CPT

## 2018-11-13 PROCEDURE — 85025 COMPLETE CBC W/AUTO DIFF WBC: CPT

## 2018-11-13 PROCEDURE — 25000003 PHARM REV CODE 250: Performed by: PSYCHIATRY & NEUROLOGY

## 2018-11-13 PROCEDURE — 97530 THERAPEUTIC ACTIVITIES: CPT

## 2018-11-13 PROCEDURE — 92526 ORAL FUNCTION THERAPY: CPT

## 2018-11-13 PROCEDURE — 25000003 PHARM REV CODE 250: Performed by: NURSE PRACTITIONER

## 2018-11-13 PROCEDURE — 92507 TX SP LANG VOICE COMM INDIV: CPT

## 2018-11-13 PROCEDURE — 63600175 PHARM REV CODE 636 W HCPCS: Performed by: STUDENT IN AN ORGANIZED HEALTH CARE EDUCATION/TRAINING PROGRAM

## 2018-11-13 PROCEDURE — 20000000 HC ICU ROOM

## 2018-11-13 PROCEDURE — 83735 ASSAY OF MAGNESIUM: CPT

## 2018-11-13 PROCEDURE — 25000242 PHARM REV CODE 250 ALT 637 W/ HCPCS: Performed by: STUDENT IN AN ORGANIZED HEALTH CARE EDUCATION/TRAINING PROGRAM

## 2018-11-13 RX ORDER — HYDRALAZINE HYDROCHLORIDE 50 MG/1
100 TABLET, FILM COATED ORAL EVERY 8 HOURS
Status: DISCONTINUED | OUTPATIENT
Start: 2018-11-13 | End: 2018-11-16

## 2018-11-13 RX ADMIN — HEPARIN SODIUM 5000 UNITS: 5000 INJECTION, SOLUTION INTRAVENOUS; SUBCUTANEOUS at 05:11

## 2018-11-13 RX ADMIN — HYDRALAZINE HYDROCHLORIDE 50 MG: 50 TABLET ORAL at 05:11

## 2018-11-13 RX ADMIN — AMLODIPINE BESYLATE 10 MG: 10 TABLET ORAL at 09:11

## 2018-11-13 RX ADMIN — LISINOPRIL 40 MG: 20 TABLET ORAL at 09:11

## 2018-11-13 RX ADMIN — HEPARIN SODIUM 5000 UNITS: 5000 INJECTION, SOLUTION INTRAVENOUS; SUBCUTANEOUS at 09:11

## 2018-11-13 RX ADMIN — IPRATROPIUM BROMIDE AND ALBUTEROL SULFATE 3 ML: .5; 3 SOLUTION RESPIRATORY (INHALATION) at 08:11

## 2018-11-13 RX ADMIN — ATORVASTATIN CALCIUM 40 MG: 20 TABLET, FILM COATED ORAL at 09:11

## 2018-11-13 RX ADMIN — ASPIRIN 81 MG CHEWABLE TABLET 81 MG: 81 TABLET CHEWABLE at 09:11

## 2018-11-13 RX ADMIN — HYDRALAZINE HYDROCHLORIDE 100 MG: 50 TABLET ORAL at 09:11

## 2018-11-13 RX ADMIN — HYDRALAZINE HYDROCHLORIDE 10 MG: 20 INJECTION INTRAMUSCULAR; INTRAVENOUS at 01:11

## 2018-11-13 RX ADMIN — IPRATROPIUM BROMIDE AND ALBUTEROL SULFATE 3 ML: .5; 3 SOLUTION RESPIRATORY (INHALATION) at 01:11

## 2018-11-13 RX ADMIN — HYDRALAZINE HYDROCHLORIDE 50 MG: 50 TABLET ORAL at 01:11

## 2018-11-13 RX ADMIN — HEPARIN SODIUM 5000 UNITS: 5000 INJECTION, SOLUTION INTRAVENOUS; SUBCUTANEOUS at 01:11

## 2018-11-13 NOTE — PT/OT/SLP PROGRESS
"     Physical Therapy Treatment    Patient Name: Tyree Pablo  MRN: [id number]   Diagnosis: Embolic stroke involving right middle cerebral artery    Recommendations:     Discharge Recommendations:  rehabilitation facility   Discharge Equipment Recommendations: (TBD)   Barriers to Discharge: decreased caregiver support, fall risk, increased caregiver burden    Plan:   During this hospitalization, the patient will be seen 5 x/week for gait training, therapeutic activities, therapeutic exercises, neuromuscular re-education to address impairments and functional mobility deficits.   · Plan of Care Expires: 12/10/18   Plan of Care Reviewed with: patient, mother    This plan of care has been discussed with the patient and/or family who were involved in its development and are in agreement with the identified goals and treatment plan.     Subjective   PT communicated with RN prior to therapy.     Patient comments/goals: "My goal is to strive to do more tomorrow than I can do today."  Pain/Comfort:  · Pain Rating 1: 0/10  · Pain Rating Post-Intervention 1: 0/10    Recent Vital Signs: (Last documentation)  Temp: 98.8 °F (37.1 °C) (11/13/18 1500)  Pulse: 105 (11/13/18 1500)  Resp: (!) 24 (11/13/18 1500)  BP: (!) 158/84 (11/13/18 1500)  SpO2: 97 % (11/13/18 1500)     Objective:   General Precautions: aspiration, fall, vision impaired  Recent Surgery: * No surgery found *    The patient currently has bed alarm, pulse ox (continuous), telemetry, blood pressure cuff, SCD, pressure relief boots.    The patient was found supine in ICU in NAD with his mother at bedside.  He agreed to therapy.  /84 in supine; 190/121 sitting EOB.  BP reading fluctuating 121/110 in sitting (cuff on R ankle).  BP was retaken several times with inconsistencies between readings (RN notified).  PT erred on the side of caution and limited OOB mobility d/t inability to get accurate BP reading in ICU setting. He also complained of lower lip swelling (he " informed RN). See below for mobility.    Functional Mobility:  Bed Mobility:   · Rolling Right: max assistance d/t L hemiplegia  · Rolling Left: NT  · Supine to Sit: moderate assistance from R sidelying; PT assisted to remove LLE from bed and to initiate transition; Patient used RUE to assist with elevation of trunk into sitting  · Sit to Supine: moderate assistance into R sidelying  · Scooting up in bed moderate assistance for positioning and tactile cues LLE to encourage bilateral bridging.     Sitting Balance at Edge of Bed:  · Assistance Level Required: min assistance with periods of CGA for LUE positioning only  · Time: 20 minutes  · Postural deviations noted: LUE hemiplegia (positioned in weight bearing); R visual gaze preference; decreased stability  · Comments/Activities:  · Visual tracking activities to the L in sitting with verbal and visual cues  · Limites of stability x 2 minutes with patient exploring carla-posterior and lateral (CGA from therapist)  · Static sitting balance     Transfers: NT d/t BP    Therapeutic Activities, Education, or Exercises:  0/5 MMT LLE, however:   - L hip extensors 2/5 in bridging activity while scooting in bed   - L hip flexors 2/5 spontaneous movement in sidelying during bed mobility     The therapist discussed the patients current mobility status, deficits, and level of assistance with patient and mother. PT educated them on elevation of LUE and strategies to improve visual tracking L. Time was provided for active listening, discussion of health disposition, and discussion of safe discharge recommendations. Therapist answered questions to patient/familys satisfaction within scope of practice.  Patient and family are aware of patient's deficits and therapy progression. White board updated to reflect current level of assistance.    The patient was left supine with all lines intact, call bell in reach, RN notified, and mother at bedside.     FUNCTIONAL OUTCOME  MEASURES:  Turning over in bed (including adjusting bedclothes, sheets and blankets)?: 2  Sitting down on and standing up from a chair with arms (e.g., wheelchair, bedside commode, etc.): 2  Moving from lying on back to sitting on the side of the bed?: 2  Moving to and from a bed to a chair (including a wheelchair)?: 1  Need to walk in hospital room?: 1  Climbing 3-5 steps with a railing?: 1  Basic Mobility Total Score: 9    Goals:     Multidisciplinary Problems     Physical Therapy Goals        Problem: Physical Therapy Goal    Goal Priority Disciplines Outcome Goal Variances Interventions   Physical Therapy Goal     PT, PT/OT Ongoing (interventions implemented as appropriate)     Description:  Goals to be met by: 2018     Patient will increase functional independence with mobility by performin. Supine to sit with MInimal Assistance  2. Sit to supine with MInimal Assistance  3. Sit to stand transfer with Minimal Assistance  4. Bed to chair transfer with Minimal Assistance  5. Gait  x 50 feet with Moderate Assistance with or without appropriate AD.   6. Sitting at edge of bed x10 minutes with Contact Guard Assistance  7. Lower extremity exercise program x15 reps per handout, with assistance as needed                    Assessment:   Tyree Pablo is a 43 y.o. male admitted with a medical diagnosis of Embolic stroke involving right middle cerebral artery.  He was motivated to participate in therapy.  Writing PT conservatively held OOB mobility d/t inconsistent BP readings in ICU setting.  Despite MMT 0/5 on LLE, he demonstrated active LLE movement during functional bed mobility.  He is expected to progress well with additional therapy.  PT will hopefully progress to transfer and gait training this week.        Problems/Impairments:  weakness, impaired functional mobilty, impaired cognition, decreased safety awareness, impaired cardiopulmonary response to activity, impaired endurance, impaired fine motor,  impaired sensation, impaired balance, decreased upper extremity function, decreased lower extremity function, visual deficits, impaired self care skills, abnormal tone     Rehab Prognosis:  good. The patient would benefit from acute skilled PT services to address these deficits and maximize their functional independence.     Time Tracking:     PT Received On:  11/13/18  PT Start Time:   1450    PT Stop Time:  1527  PT Total Time (min): 37 min     Billable Minutes: Therapeutic Activity 15 and Neuromuscular Re-education 15     Val Guerra, PT  11/13/2018  [phone] (pager)

## 2018-11-13 NOTE — ASSESSMENT & PLAN NOTE
-s/p tPA, thrombectomy  -Vascular Neurology following, appreciate recs  -Goal SBP < 160--hydralazine 100 mg q8h, amlodipine 10 mg qd  -11/10/18 MRI Brain w/o contrast showing large R MCA stroke  -Hemicrani watch w/ NSGY following--recommending Na 145-155  -Exam largely unchanged, bolus with hypertonic saline for exam worsening  -PT/OT/SLP following, appreciate recs  -Etiology still unclear--2D Echo w/ bubble contrast for PFO pending (No DVT on US BLE)   -Hypercoagulability panel: so far negative beta-glycoproteins, DRVVT; nml homocysteine

## 2018-11-13 NOTE — PLAN OF CARE
Problem: Patient Care Overview  Goal: Plan of Care Review  Outcome: Ongoing (interventions implemented as appropriate)  POC reviewed with pt and family at 0300. Pt verbalized understanding. Mother at bedside. Questions and concerns addressed. No acute events today. Pt progressing toward goals. Will continue to monitor. See flowsheets for full assessment and VS info.     -Went to CT at 0200 with cardiac monitor. RN x2. Patient tolerated transfer and exam well.   -Hemodynamically stable  -No respiratory distress noted.   -No complaints of pain.   -Pt is still experiencing L side hemiparesis, numbness. Neuro exam has been unchanged.

## 2018-11-13 NOTE — PLAN OF CARE
Problem: Physical Therapy Goal  Goal: Physical Therapy Goal  Goals to be met by: 2018     Patient will increase functional independence with mobility by performin. Supine to sit with MInimal Assistance  2. Sit to supine with MInimal Assistance  3. Sit to stand transfer with Minimal Assistance  4. Bed to chair transfer with Minimal Assistance  5. Gait  x 50 feet with Moderate Assistance with or without appropriate AD.   6. Sitting at edge of bed x10 minutes with Contact Guard Assistance  7. Lower extremity exercise program x15 reps per handout, with assistance as needed     Patient participated well in therapy. OOB mobility held d/t BP readings during therapy (appeared inaccurate, but unable to verify).  Conservative therapy performed instead in ICU setting with inconsistent BP  POC and goals remain appropriate.  Please refer to the progress note for functional mobility.     Val Guerra, PT  2018  [phone] (pager)

## 2018-11-13 NOTE — ASSESSMENT & PLAN NOTE
-Goal SBP < 160   -Echo w/ normal LVEF  -Continue amlodipine 10 mg qd  -Continue hydralazine 100 mg q8h
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
## 2018-11-13 NOTE — PT/OT/SLP PROGRESS
"Occupational Therapy   Treatment    Name: Tyree Pablo  MRN: [id number]  Admitting Diagnosis:  Embolic stroke involving right middle cerebral artery       Recommendations:     Discharge Recommendations: rehabilitation facility  Discharge Equipment Recommendations:  3-in-1 commode, bath bench  Barriers to discharge:  Inaccessible home environment, Decreased caregiver support    Subjective   Patient:  "I strive every day to do better than the day before."  "I want to do rehab in [place]."  Communicated with: Nurse prior to session.  Pain/Comfort:  · Pain Rating 1: 0/10  · Pain Rating Post-Intervention 1: 0/10    Patients cultural, spiritual, Temple conflicts given the current situation: Mandaen    Objective:     Patient found with: arterial line, bed alarm, SCD, pulse ox (continuous), telemetry, peripheral IV, pressure relief boots, Condom Catheter  Mother present.  General Precautions: Standard, aspiration, fall, pureed diet   Orthopedic Precautions:N/A   Braces: N/A     Occupational Performance:    Bed Mobility:    · Patient completed Rolling/Turning to Left with  stand by assistance  · Patient completed Rolling/Turning to Right with moderate assistance  · Patient completed Scooting/Bridging with moderate assistance  · Patient completed Supine to Sit with moderate assistance  · Patient completed Sit to Supine with moderate assistance     Functional Mobility/Transfers:  · Total assist with scooting along the EOB    Activities of Daily Living:  · Grooming: moderate assistance while seated EOB with left UE in weight bearing  · Upper Body Dressing: maximal assistance while seated EOB  · Lower Body Dressing: total assistance while seated EOB    Patient left supine with all lines intact, call button in reach and bed alarm on    AMPA 6 Click:  [facility] Total Score: 11    Treatment & Education:  Patient/ Family education provided for stroke warning signs, prevention guidelines and personal risk factors.  Patient/ " Family verbalizing understanding via teach back method.   Patient education provided on role of OT and need for rehab upon discharge.  Patient education provided on hemiplegic dressing technique, left UE weight bearing / positioning, postural control, and transfers.   Continued education, patient/ family training recommended. Patient alert and oriented x 3; able to follow 4/4 one step commands.  Patient attentive and interactive throughout the session. Addressed increasing attention to the left.  Daily orientation provided.  PROM performed left UE one set x 10 rep in all planes of motion with stretches provided at end range; sustained stretch provided for external rotation.  Assistance and facilitation provided for upward rotation of the scapula during shoulder flexion and abduction.  Addressed left UE weight bearing while seated EOB and during transitional movement patterns.  SBA-min assist with postural control while seated EOB.  Positioning provided for midline orientation with bilateral UEs elevated and heels lifted off mattress.  Gentle cervical rotation provided.   Family present during the session.  Patient's functional status and disposition recommendation discussed with patient and nurse.  White board updated in patient's room.  OT asked if there were any other questions; patient/ family had no further questions.    Education:    Assessment:     Tyree Pablo is a 43 y.o. male with a medical diagnosis of Embolic stroke involving right middle cerebral artery.  He presents with performance deficits affecting function are weakness, impaired endurance, gait instability, impaired cognition, impaired functional mobilty, impaired self care skills, impaired balance, decreased coordination, decreased upper extremity function, decreased lower extremity function, abnormal tone, impaired fine motor, impaired coordination, decreased safety awareness, decreased ROM.      Rehab Prognosis: Good; patient would benefit from  acute skilled OT services to address these deficits and reach maximum level of function.       Plan:     Patient to be seen 4 x/week to address the above listed problems via self-care/home management, neuromuscular re-education, cognitive retraining, therapeutic activities, therapeutic exercises, sensory integration  · Plan of Care Expires: 12/08/18  · Plan of Care Reviewed with: patient, mother    This Plan of care has been discussed with the patient who was involved in its development and understands and is in agreement with the identified goals and treatment plan    GOALS:   Multidisciplinary Problems     Occupational Therapy Goals        Problem: Occupational Therapy Goal    Goal Priority Disciplines Outcome Interventions   Occupational Therapy Goal     OT, PT/OT Ongoing (interventions implemented as appropriate)    Description:  Goals set 11/10 to be addressed for 7 days with expiration date, 11/17:  Patient will increase functional independence with ADLs by performing:    Patient will demonstrate rolling to the right with min assist.  Not met   Patient will demonstrate rolling to the left with modified independence.   Not met  Patient will demonstrate supine -sit with SBA.   Not met  Patient will demonstrate stand pivot transfers with min assist.   Not met  Patient will demonstrate grooming while standing with min assist.   Not met  Patient will demonstrate upper body dressing with mod assist while seated EOB.   Not met  Patient will demonstrate lower body dressing with mod assist while seated EOB.   Not met  Patient will demonstrate toileting with mod assist.   Not met  Patient's family / caregiver will demonstrate independence and safety with assisting patient with self-care skills and functional mobility.     Not met  Patient and/or patient's family will verbalize understanding of stroke prevention guidelines, personal risk factors and stroke warning signs via teachback method.  Not met                            Time Tracking:     OT Date of Treatment: 11/13/18  OT Start Time: 0917  OT Stop Time: 1012  OT Total Time (min): 55 min    Billable Minutes:Self Care/Home Management 30  Neuromuscular Re-education 25    GABRIELLE Bradshaw  11/13/2018

## 2018-11-13 NOTE — SUBJECTIVE & OBJECTIVE
Neurologic Chief Complaint: right MCA infarct    Subjective:     Interval History: Patient is seen for follow-up neurological assessment and treatment recommendations:RENNYON patient neurologically stable.  Repeat CT head showing 0.2 cm midline shift and mass effect.  NCC will continue to monitor cerebral edema.      HPI, Past Medical, Family, and Social History remains the same as documented in the initial encounter.     Review of Systems   Constitutional: Negative for chills and fever.   HENT: Positive for congestion. Negative for drooling.    Respiratory: Negative for cough and shortness of breath.    Gastrointestinal: Negative for diarrhea and vomiting.   Skin: Negative for pallor and rash.   Psychiatric/Behavioral: Negative for agitation and behavioral problems.     Scheduled Meds:   albuterol-ipratropium  3 mL Nebulization Q6H WAKE    amLODIPine  10 mg Oral Daily    aspirin  81 mg Oral Daily    atorvastatin  40 mg Oral Daily    heparin (porcine)  5,000 Units Subcutaneous Q8H    hydrALAZINE  50 mg Oral Q8H    lisinopril  40 mg Oral Daily     Continuous Infusions:  PRN Meds:dextrose 50%, glucagon (human recombinant), hydrALAZINE, magnesium oxide, magnesium oxide, potassium chloride 10%, potassium chloride 10%, potassium chloride 10%, potassium, sodium phosphates, potassium, sodium phosphates, potassium, sodium phosphates, sodium chloride 0.9%    Objective:     Vital Signs (Most Recent):  Temp: 98.4 °F (36.9 °C) (11/13/18 0701)  Pulse: 93 (11/13/18 0822)  Resp: 19 (11/13/18 0822)  BP: 127/68 (11/13/18 0931)  SpO2: 100 % (11/13/18 0822)  BP Location: Left arm    Vital Signs Range (Last 24H):  Temp:  [98.1 °F (36.7 °C)-98.8 °F (37.1 °C)]   Pulse:  []   Resp:  [9-99]   BP: (101-127)/(68-85)   SpO2:  [94 %-100 %]   Arterial Line BP: ()/(69-92)   BP Location: Left arm    Physical Exam   Constitutional: He appears well-developed and well-nourished.   HENT:   Head: Normocephalic and atraumatic.   Eyes:  Pupils are equal, round, and reactive to light.   Left gaze palsy    Cardiovascular: Normal rate and regular rhythm.   Pulmonary/Chest: No respiratory distress.   Abdominal: He exhibits no distension. There is no guarding.   Musculoskeletal: He exhibits no edema or deformity.       Neurological Exam:   LOC: alert  Attention Span: Good   Language: mild expressive aphasia  Articulation: mild dysarthria  Orientation: Person, Place, Time   Visual Fields: Hemianopsia left  EOM (CN III, IV, VI): Palsy CN III: left  Pupils (CN II, III): PERRL  Motor: Arm left  Plegia 0/5  Leg left  Paresis: 1/5  Arm right  Normal 5/5  Leg right Normal 5/5  Sensation: Yunior-anesthesia left  Tone:flaccid in left upper extremity     Laboratory:  CMP:   Recent Labs   Lab 11/13/18  0143   CALCIUM 9.2      K 4.3   CO2 25      BUN 20   CREATININE 0.9     CBC:   Recent Labs   Lab 11/13/18  0143   WBC 7.56   RBC 4.98   HGB 12.5*   HCT 39.1*      MCV 79*   MCH 25.1*   MCHC 32.0     Lipid Panel:   Recent Labs   Lab 11/09/18  0353   CHOL 201*   LDLCALC 141.4   HDL 35*   TRIG 123     Hgb A1C:   Recent Labs   Lab 11/09/18  0353   HGBA1C 5.9*     TSH:   Recent Labs   Lab 11/09/18  0353   TSH 1.560       Diagnostic Results     Brain Imaging   CT head 11/13/18  Evolving right MCA infarct with 0.2cm MLS  Mass effect upon the right lateral ventricle   Petechial hemorrhage     MRI brain 11/09/18    Large right MCA infarct   Cytotoxic cerebral edema  Petechial hemorrhage inside lesion     Vessel Imaging   Cerebral angiogram 11/09/18  Thromboaspiration of the m1 segment of right middle cerebral artery with TICI 3 reperfusion.      CTA MP 11/09/18  Abrupt distal m1 occlusion.    Cardiac Imaging   Echo 11/10/18  · Left ventricle ejection fraction is normal at 65%  · Normal LV diastolic function.  · RV systolic function is normal.  · Trace tricuspid regurgitation.  · Normal central venous pressure (3 mm Hg).  · No pericardial effusion.

## 2018-11-13 NOTE — PLAN OF CARE
Problem: Occupational Therapy Goal  Goal: Occupational Therapy Goal  Goals set 11/10 to be addressed for 7 days with expiration date, 11/17:  Patient will increase functional independence with ADLs by performing:    Patient will demonstrate rolling to the right with min assist.  Not met   Patient will demonstrate rolling to the left with modified independence.   Not met  Patient will demonstrate supine -sit with SBA.   Not met  Patient will demonstrate stand pivot transfers with min assist.   Not met  Patient will demonstrate grooming while standing with min assist.   Not met  Patient will demonstrate upper body dressing with mod assist while seated EOB.   Not met  Patient will demonstrate lower body dressing with mod assist while seated EOB.   Not met  Patient will demonstrate toileting with mod assist.   Not met  Patient's family / caregiver will demonstrate independence and safety with assisting patient with self-care skills and functional mobility.     Not met  Patient and/or patient's family will verbalize understanding of stroke prevention guidelines, personal risk factors and stroke warning signs via teachback method.  Not met          Goals remain appropriate.  GABRIELLE Bradshaw  11/13/2018

## 2018-11-13 NOTE — ASSESSMENT & PLAN NOTE
43 y.o. male with significant past medical history of obesity, HTN, tobacco abuse who was transferred from [facility] after receiving IV-tPA for acute left sided weakness.  Found to have R M1 occlusion of the right middle cerebral artery and taken to interventional radiology for thrombectomy.  Patient had successful thromboaspiration with TICI 3 reperfusion       MRI brain showed large right middle cerebral artery territory infarct.  Mild petechial hemorrhage.  Utox for multiple drugs of abuse including THC with amphetamine.  Amphetamines can cause abnormal heart rhythms and could be the etiology of stroke.  Patient does have other risk factors such as family history of early stroke, obesity, tobacco abuse, hypertension.      CT 11/13 Showing 0.2cm MLS shift and mass effect    Statins for secondary stroke prevention and hyperlipidemia, if present:   Statins: Atorvastatin- 40 mg daily     Aggressive risk factor modification: HTN, Smoking, Obesity, Drug use     Rehab efforts: PT/OT/SLP to evaluate and treat inpatient rehab     Diagnostics ordered/pending: hypercoagulable panel, serial CTs     VTE prophylaxis: SCDs. Heparin 5000 units q 8hours     BP parameters: Infarct: Post tPA, SBP <180

## 2018-11-13 NOTE — SUBJECTIVE & OBJECTIVE
Interval History:  >4 elements OR status of 3 inpatient conditions:  Patient is slightly more awake, alert this am.  His CT head was notable for 2 mm midline shift with stable findings otherwise, now day 5 s/p stroke.  Plan to watch with q1h neuro checks for another day prior to stepdown.  CM/SW trying to work on rehab options for patient. Of note, patient has been on lisinopril for a few days, today c/o lip swelling and noted that he had to stop lisinopril once before due to lip/tongue swelling that sounds consistent with angioedema.    Review of Systems   Constitutional: Positive for fatigue. Negative for chills and fever.   HENT: Negative for rhinorrhea and sneezing.    Eyes: Negative for photophobia and visual disturbance.   Respiratory: Negative for cough and shortness of breath.    Gastrointestinal: Negative for nausea and vomiting.   Musculoskeletal: Positive for gait problem. Negative for neck pain and neck stiffness.   Skin: Negative for rash and wound.   Neurological: Positive for facial asymmetry, weakness and numbness.   Hematological: Negative for adenopathy. Does not bruise/bleed easily.   Psychiatric/Behavioral: Negative for agitation and confusion.     Objective:     Vitals:  Temp: 98.8 °F (37.1 °C)  Pulse: 105  Rhythm: normal sinus rhythm  BP: (!) 158/84  MAP (mmHg): 114  Resp: (!) 24  SpO2: 97 %  O2 Device (Oxygen Therapy): room air    Temp  Min: 98.1 °F (36.7 °C)  Max: 99 °F (37.2 °C)  Pulse  Min: 84  Max: 109  BP  Min: 101/82  Max: 179/88  MAP (mmHg)  Min: 96  Max: 117  Resp  Min: 9  Max: 99  SpO2  Min: 94 %  Max: 100 %    11/12 0701 - 11/13 0700  In: 730 [P.O.:730]  Out: 1485 [Urine:1485]   Unmeasured Output  Urine Occurrence: 1  Stool Occurrence: 1  Pad Count: 1     Physical Exam  General:  Well-developed, well-nourished, nad  HEENT:  NCAT, PERRLA, EOMI, oropharyngeal membranes non-erythematous/without exudate  Neck:  Supple, normal ROM without nuchal rigidity  Resp:  Symmetric expansion, no  increased wob  CVS:  No LE edema, peripheral pulses 2+ (radial, dorsalis pedis)  GI:  Abd soft, non-distended, non-tender to palpation  Neurologic Exam:  Mental Status:  AAOx3.  Speech, thought content appropriate.   Cranial Nerves:  VFs intact on counting fingers in all quadrants bilaterally, no clear visual field deficits.  PERRLA, EOMI.  Mild L lower facial droop.  Palate raises symmetrically, tongue protrudes midline. Trapezius 5/5 RUE.  +Visuospatial hemineglect of L.  Motor:  Normal bulk and tone RUE/RLE.  LUE flaccid. RUE shoulder abduction, biceps/triceps,  strength 5/5.  LUE without volitional movement or movement to noxious stimuli.  LLE with slight withdrawal to noxious stimuli--appears to have lateral movement with noxious stimuli to the knee, +grimace.  Sensory:  Denies sensation to LUE, no movement with noxious stimuli.  LLE with grimace to noxious stimuli.  +inattention to LUE, LLE on bilateral tactile stimulation.  Reflexes:  Biceps, brachioradialis, patellar 2+ and symmetric.  No ankle clonus. Equivocal toe bilaterally.  Coordination:  R FNF, RE, HTS intact with no dysmetria/ataxia/dysdiadochokinesia.  No resting tremor or ataxia--limited 2/2 weakness in LUE/LLE  Gait:  Deferred 2/2 weakness, fall risk.    Medications:  Continuous Scheduled    albuterol-ipratropium 3 mL Q6H WAKE   amLODIPine 10 mg Daily   aspirin 81 mg Daily   atorvastatin 40 mg Daily   heparin (porcine) 5,000 Units Q8H   hydrALAZINE 100 mg Q8H   PRN    dextrose 50% 12.5 g PRN   glucagon (human recombinant) 1 mg PRN   hydrALAZINE 10 mg Q4H PRN   magnesium oxide 800 mg PRN   magnesium oxide 800 mg PRN   potassium chloride 10% 40 mEq PRN   potassium chloride 10% 40 mEq PRN   potassium chloride 10% 60 mEq PRN   potassium, sodium phosphates 2 packet PRN   potassium, sodium phosphates 2 packet PRN   potassium, sodium phosphates 2 packet PRN   sodium chloride 0.9% 5 mL PRN     Today I personally reviewed pertinent  lines/drains/airways, imaging, cardiology results, laboratory results, microbiology results, notably:    Diet  Diet Dysphagia Mechanical Soft (IDDSI Level 5) Thin  Diet Dysphagia Mechanical Soft (IDDSI Level 5) Thin    LDA:  18 R radial arterial line  18 PIV RUE  11/10/18 PICC R brachial  18 Condom catheter    Imagin18 CTA Stroke Multiphase:  Abrupt termination of the distal M1 segment of the right MCA, compatible with occlusion.  Associated hypodensity centered within the right sub insular region is compatible with early ischemic change of right MCA territory infarction.  No definite hemorrhage, noting the adjacent basal ganglia appear hyperdense adjacent to the involved hypodense white matter.  Diminutive caliber of vessels, as above, possibly related to vasospasm.    18 IR angiogram:  The left carotid bifurcation had no significant atherosclerotic disease.  Intracranial views demonstrated abrupt M1 segment occlusion about 1 cm beyond its origin with moderate collateral flow to the right MCA distal territory from the DAPHNE and PCA.  It was elected to perform mechanical thrombectomy.    18 MRI Brain w/o contrast:  Large right MCA territory distribution acute infarct without significant leftward midline shift.  There is scattered gradient susceptibility throughout the region of infarction, most notably within the right basal ganglia, suggestive of superimposed petechial hemorrhage.    Cardiology:  18 TTE w/ bubble study:  PENDING    11/10/18 TTE w/ CFD:  · Left ventricle ejection fraction is normal at 65%  · Normal LV diastolic function.  · RV systolic function is normal.  · Trace tricuspid regurgitation.  · Normal central venous pressure (3 mm Hg).  · No pericardial effusion.    Labs:  Recent Labs   Lab 18  0143   WBC 7.56   RBC 4.98   HGB 12.5*   HCT 39.1*      MCV 79*   MCH 25.1*   MCHC 32.0     Recent Labs   Lab 18  0143   CALCIUM 9.2      K  4.3   CO2 25      BUN 20   CREATININE 0.9     Microbiology:  Microbiology Results (last 7 days)     ** No results found for the last 168 hours. **

## 2018-11-13 NOTE — PLAN OF CARE
CT head 2mm MLS with mass effect  Neuro exam unchanged  Possible transfer to [facility] in am  hypercoaguable work up in progress.   TTE bubble study neg for shunt/LED neg for DVT  Mobilize oob to chair as tolerated with PT/OT  Begin placement

## 2018-11-13 NOTE — PLAN OF CARE
Problem: SLP Goal  Goal: SLP Goal  Speech Language Pathology Goals  Goals expected to be met by 11/19:  1. Patient will tolerate a dental soft diet and thin liquids with no overt signs of airway compromise.   2. Patient will complete OME's x10 each for improved lingual/labial strength and ROM.  3. Patient will demonstrate sustained attention to therapy tasks for 2 min with no cues.   4. Patient will provide 12+ items within concrete category in 1 minute indep.   5. Patient will complete mental manipulation tasks with 80% acc indep.   6. Patient will complete high level problem solving/reasoning tasks with 80% acc indep.   7. Patient will complete visual spatial/tracking tasks to improve left neglect given mod A.   7. Patient will participate in further assessments regarding reading and writing.       Goals due 11/16  1.  Tolerate trials of [facility] soft diet with thin liquids with no s/s of aspiration  2.  Participate in speech language evaluation    Outcome: Ongoing (interventions implemented as appropriate)  Goals remain appropriate, cont POC. Okay for meds whole 1-2 at a time with thin liquids.  Please check L buccal cavity for pocketing with medications and soft solids.  MARC Lovelace, CCC/SLP

## 2018-11-13 NOTE — ASSESSMENT & PLAN NOTE
43 y.o. male with significant past medical history of obesity, HTN, tobacco abuse who was transferred from [facility] after receiving IV-tPA for acute left sided weakness.  Found to have R M1 occlusion of the right middle cerebral artery and taken to interventional radiology for thrombectomy.  Patient had successful thromboaspiration with TICI 3 reperfusion       MRI brain showed large right middle cerebral artery territory infarct.  Mild petechial hemorrhage.  Utox for multiple drugs of abuse including THC with amphetamine.  Amphetamines can cause abnormal heart rhythms and could be the etiology of stroke.  Patient does have other risk factors such as family history of early stroke, obesity, tobacco abuse, hypertension.  Echo with bubble 11/12: negative for intracardiac shunt    CT 11/13 Showing 0.2cm MLS shift and mass effect.  Neurologically stable    Antithrombotics: asa 81mg qd     Statins for secondary stroke prevention and hyperlipidemia, if present:   Statins: Atorvastatin- 40 mg daily     Aggressive risk factor modification: HTN, Smoking, Obesity, Drug use     Rehab efforts: PT/OT/SLP to evaluate and treat inpatient rehab     Diagnostics ordered/pending: hypercoagulable panel    VTE prophylaxis: SCDs. Heparin 5000 units q 8hours     BP parameters: Infarct: Post tPA, SBP <160

## 2018-11-13 NOTE — PLAN OF CARE
Problem: Patient Care Overview  Goal: Plan of Care Review  Outcome: Ongoing (interventions implemented as appropriate)  POC reviewed with pt and family at 0900. Pt and mom verbalized understanding. Questions and concerns addressed. No acute events today. Pt progressing toward goals. Will continue to monitor. See flowsheets for full assessment and VS info.

## 2018-11-13 NOTE — PLAN OF CARE
Sw met with patient and patient family members at bedside regarding rehab list left yesterday. Pt's wife reported, patient's kids are coming tonight to review the list with the patient.     Marysol Kim LMSW  Neurocritical Care   [facility]  [id number]

## 2018-11-13 NOTE — PROGRESS NOTES
[facility]  Vascular Neurology  Comprehensive Stroke Center  Progress Note    Assessment/Plan:     * Embolic stroke involving right middle cerebral artery    43 y.o. male with significant past medical history of obesity, HTN, tobacco abuse who was transferred from [facility] after receiving IV-tPA for acute left sided weakness.  Found to have R M1 occlusion of the right middle cerebral artery and taken to interventional radiology for thrombectomy.  Patient had successful thromboaspiration with TICI 3 reperfusion       MRI brain showed large right middle cerebral artery territory infarct.  Mild petechial hemorrhage.  Utox for multiple drugs of abuse including THC with amphetamine.  Amphetamines can cause abnormal heart rhythms and could be the etiology of stroke.  Patient does have other risk factors such as family history of early stroke, obesity, tobacco abuse, hypertension.  Echo with bubble 11/12: negative for intracardiac shunt    CT 11/13 Showing 0.2cm MLS shift and mass effect    Statins for secondary stroke prevention and hyperlipidemia, if present:   Statins: Atorvastatin- 40 mg daily     Aggressive risk factor modification: HTN, Smoking, Obesity, Drug use     Rehab efforts: PT/OT/SLP to evaluate and treat inpatient rehab     Diagnostics ordered/pending: hypercoagulable panel, serial CTs     VTE prophylaxis: SCDs. Heparin 5000 units q 8hours     BP parameters: Infarct: Post tPA, SBP <180         Abuse, drug or alcohol    Positive Utox screen for THC, amphetamine, opioids, benzodiazepines   Etiology of stroke        Cytotoxic cerebral edema    -Area of cytotoxic cerebral edema identified when reviewing brain imaging in the territory of the right middle cerebral artery. There is no mass effect associated with it. We will continue to monitor the patients clinical exam for any worsening of symptoms which may indicate expansion of the stroke or the area of the edema  resulting in the clinical change. The pattern is suggestive of embolic etiology.    Patient on hemicrani watch, neurologically stable but has new midline shift and mass effect          Essential hypertension    -Stroke risk factor.  SBP<180.  -Resume home meds when appropriate.     Mixed hyperlipidemia    Risk factor for stroke     Atorvastatin 40mg qhs     Dysarthria    Due to stroke   SLP ordered      Tobacco abuse    -Stroke risk factor.  Encourage cessation.  -Nicotine patch prn.     S/P admn tPA in diff fac w/n last 24 hr bef adm to crnt fac    -Admitted to [facility]CC for close monitoring.     Acute left-sided weakness    Due stroke  PT, OT ordered           11/12/18 Investigating etiologies of stroke.  Hypercoagulable and echo with bubble ordered. Patient utox + for amphetamines, THC, benzo, opiates.     11/13/18 NAEON patient neurologically stable.  Repeat CT head showing 0.2 cm midline shift and mass effect.  NCC will continue to monitor cerebral edema.      STROKE DOCUMENTATION   Acute Stroke Times   Last Known Normal Date: 11/08/18  Last Known Normal Time: 1930  Symptom Onset Date: 11/08/18  Symptom Onset Time: 2015  Stroke Team Called Date: 11/08/18  Stroke Team Called Time: 2212  Stroke Team Arrival Date: 11/08/18  Stroke Team Arrival Time: 2213  CT Interpretation Time: 2213(no acute findings)  Decision to Treat Time for Alteplase: 2228(tPA administered)  Decision to Treat Time for IR: 0347(no intervention at this time)    NIH Scale:  1a. Level Of Consciousness: 0-->Alert: keenly responsive  1b. LOC Questions: 0-->Answers both questions correctly  1c. LOC Commands: 0-->Performs both tasks correctly  2. Best Gaze: 1-->Partial gaze palsy: gaze is abnormal in one or both eyes, but forced deviation or total gaze paresis is not present  3. Visual: 1-->Partial hemianopia(some preservation )  4. Facial Palsy: 1-->Minor paralysis (flattened nasolabial fold, asymmetry on smiling)  5a. Motor Arm, Left: 4-->No  movement  5b. Motor Arm, Right: 0-->No drift: limb holds 90 (or 45) degrees for full 10 secs  6a. Motor Leg, Left: 4-->No movement  6b. Motor Leg, Right: 0-->No drift: leg holds 30 degree position for full 5 secs  7. Limb Ataxia: 0-->Absent  8. Sensory: 2-->Severe to total sensory loss: patient is not aware of being touched in the face, arm, and leg  9. Best Language: 1-->Mild-to-moderate aphasia: some obvious loss of fluency or facility of comprehension, without significant limitation on ideas expressed or form of expression. Reduction of speech and/or comprehension, however, makes conversation. . . (see row details)  10. Dysarthria: 1-->Mild-to-moderate dysarthria: patient slurs at least some words and, at worst, can be understood with some difficulty  11. Extinction and Inattention (formerly Neglect): 2-->Profound rito-inattention/extinction more than 1 modality  Total (NIH Stroke Scale): 17       Modified [place] Score: 0  [place] Coma Scale:    ABCD2 Score:    [id number] Score:   HAS -BLED Score:   ICH Score:   [employer] Classification:      Hemorrhagic change of an Ischemic Stroke: Does this patient have an ischemic stroke with hemorrhagic changes? Yes, Grading Scale: HI Type 1 (HI-1) = small petechiae along the margins of the infarct. Is this a symptomatic change?  No - Hemorrhage is not clinically significant     Neurologic Chief Complaint: right MCA infarct    Subjective:     Interval History: Patient is seen for follow-up neurological assessment and treatment recommendations:MAKENZIE patient neurologically stable.  Repeat CT head showing 0.2 cm midline shift and mass effect.  NCC will continue to monitor cerebral edema.      HPI, Past Medical, Family, and Social History remains the same as documented in the initial encounter.     Review of Systems   Constitutional: Negative for chills and fever.   HENT: Positive for congestion. Negative for drooling.    Respiratory: Negative for cough and shortness of  breath.    Gastrointestinal: Negative for diarrhea and vomiting.   Skin: Negative for pallor and rash.   Psychiatric/Behavioral: Negative for agitation and behavioral problems.     Scheduled Meds:   albuterol-ipratropium  3 mL Nebulization Q6H WAKE    amLODIPine  10 mg Oral Daily    aspirin  81 mg Oral Daily    atorvastatin  40 mg Oral Daily    heparin (porcine)  5,000 Units Subcutaneous Q8H    hydrALAZINE  50 mg Oral Q8H    lisinopril  40 mg Oral Daily     Continuous Infusions:  PRN Meds:dextrose 50%, glucagon (human recombinant), hydrALAZINE, magnesium oxide, magnesium oxide, potassium chloride 10%, potassium chloride 10%, potassium chloride 10%, potassium, sodium phosphates, potassium, sodium phosphates, potassium, sodium phosphates, sodium chloride 0.9%    Objective:     Vital Signs (Most Recent):  Temp: 98.4 °F (36.9 °C) (11/13/18 0701)  Pulse: 93 (11/13/18 0822)  Resp: 19 (11/13/18 0822)  BP: 127/68 (11/13/18 0931)  SpO2: 100 % (11/13/18 0822)  BP Location: Left arm    Vital Signs Range (Last 24H):  Temp:  [98.1 °F (36.7 °C)-98.8 °F (37.1 °C)]   Pulse:  []   Resp:  [9-99]   BP: (101-127)/(68-85)   SpO2:  [94 %-100 %]   Arterial Line BP: ()/(69-92)   BP Location: Left arm    Physical Exam   Constitutional: He appears well-developed and well-nourished.   HENT:   Head: Normocephalic and atraumatic.   Eyes: Pupils are equal, round, and reactive to light.   Left gaze palsy    Cardiovascular: Normal rate and regular rhythm.   Pulmonary/Chest: No respiratory distress.   Abdominal: He exhibits no distension. There is no guarding.   Musculoskeletal: He exhibits no edema or deformity.       Neurological Exam:   LOC: alert  Attention Span: Good   Language: mild expressive aphasia  Articulation: mild dysarthria  Orientation: Person, Place, Time   Visual Fields: Hemianopsia left  EOM (CN III, IV, VI): Palsy CN III: left  Pupils (CN II, III): PERRL  Motor: Arm left  Plegia 0/5  Leg left  Paresis: 1/5  Arm  right  Normal 5/5  Leg right Normal 5/5  Sensation: Yunior-anesthesia left  Tone:flaccid in left upper extremity     Laboratory:  CMP:   Recent Labs   Lab 11/13/18  0143   CALCIUM 9.2      K 4.3   CO2 25      BUN 20   CREATININE 0.9     CBC:   Recent Labs   Lab 11/13/18  0143   WBC 7.56   RBC 4.98   HGB 12.5*   HCT 39.1*      MCV 79*   MCH 25.1*   MCHC 32.0     Lipid Panel:   Recent Labs   Lab 11/09/18  0353   CHOL 201*   LDLCALC 141.4   HDL 35*   TRIG 123     Hgb A1C:   Recent Labs   Lab 11/09/18  0353   HGBA1C 5.9*     TSH:   Recent Labs   Lab 11/09/18  0353   TSH 1.560       Diagnostic Results     Brain Imaging   CT head 11/13/18  Evolving right MCA infarct with 0.2cm MLS  Mass effect upon the right lateral ventricle   Petechial hemorrhage     MRI brain 11/09/18    Large right MCA infarct   Cytotoxic cerebral edema  Petechial hemorrhage inside lesion     Vessel Imaging   Cerebral angiogram 11/09/18  Thromboaspiration of the m1 segment of right middle cerebral artery with TICI 3 reperfusion.      CTA MP 11/09/18  Abrupt distal m1 occlusion.    Cardiac Imaging   Echo 11/10/18  · Left ventricle ejection fraction is normal at 65%  · Normal LV diastolic function.  · RV systolic function is normal.  · Trace tricuspid regurgitation.  · Normal central venous pressure (3 mm Hg).  · No pericardial effusion.     Echo with bubble 11/12: negative for intracardiac shunt        Fabiana Michael PA-C  Comprehensive Stroke Center  Department of Vascular Neurology   [facility]

## 2018-11-13 NOTE — NURSING
Notified Dr. Adler pt having swelling of lower lip, no tongue or throat involvement and no SOB. Pt states when he previously took Lisinopril he had facial swelling. Instructed pt to notify RN if any SOB or throat swelling occurs. She will D/C Lisinopril.

## 2018-11-13 NOTE — SUBJECTIVE & OBJECTIVE
Medications:  Continuous Infusions:  Scheduled Meds:   albuterol-ipratropium  3 mL Nebulization Q6H WAKE    amLODIPine  10 mg Oral Daily    aspirin  81 mg Oral Daily    atorvastatin  40 mg Oral Daily    heparin (porcine)  5,000 Units Subcutaneous Q8H    hydrALAZINE  50 mg Oral Q8H    lisinopril  40 mg Oral Daily     PRN Meds:dextrose 50%, glucagon (human recombinant), hydrALAZINE, magnesium oxide, magnesium oxide, potassium chloride 10%, potassium chloride 10%, potassium chloride 10%, potassium, sodium phosphates, potassium, sodium phosphates, potassium, sodium phosphates, sodium chloride 0.9%     Review of Systems    Objective:     Weight: 100.3 kg (221 lb 1.9 oz)  Body mass index is 33.62 kg/m².  Vital Signs (Most Recent):  Temp: 98.6 °F (37 °C) (11/13/18 0300)  Pulse: 88 (11/13/18 0600)  Resp: (!) 31 (11/13/18 0600)  BP: 108/85 (11/13/18 0500)  SpO2: 95 % (11/13/18 0600) Vital Signs (24h Range):  Temp:  [98.1 °F (36.7 °C)-98.8 °F (37.1 °C)] 98.6 °F (37 °C)  Pulse:  [] 88  Resp:  [16-99] 31  SpO2:  [94 %-99 %] 95 %  BP: (101-108)/(82-85) 108/85  Arterial Line BP: ()/(69-92) 132/82                      Male External Urinary Catheter 11/09/18 0715 Large (Active)   Collection Container Urimeter 11/11/2018  3:05 PM   Securement Method secured to top of thigh w/ adhesive device 11/11/2018  3:05 PM   Skin no redness;no breakdown 11/11/2018  3:05 PM   Tolerance no signs/symptoms of discomfort 11/11/2018  3:05 PM   Output (mL) 300 mL 11/11/2018  2:00 PM   Catheter Change Date 11/11/18 11/11/2018  3:05 PM   Catheter Change Time 1000 11/11/2018  3:05 PM       Neurosurgery Physical Exam    AAOx3  L facial droop, mild dysarthria  R gaze deviation, PERRL  RUE/RLE 5/5 throughout, follows commands  LUE/LLE plegic  No sensation LUE/LLE      Significant Labs:  Recent Labs   Lab 11/12/18  0128 11/13/18  0143    100    137   K 4.1 4.3    103   CO2 24 25   BUN 14 20   CREATININE 0.8 0.9    CALCIUM 9.3 9.2   MG 2.3 2.4     Recent Labs   Lab 11/12/18  0128 11/13/18  0143   WBC 8.61 7.56   HGB 12.3* 12.5*   HCT 36.9* 39.1*    242     No results for input(s): LABPT, INR, APTT in the last 48 hours.  Microbiology Results (last 7 days)     ** No results found for the last 168 hours. **        All pertinent labs from the last 24 hours have been reviewed.    Significant Diagnostics:  I have reviewed all pertinent imaging results/findings within the past 24 hours.

## 2018-11-13 NOTE — ASSESSMENT & PLAN NOTE
43M with R MCA M1 LVO s/p tPA and trombectomy (11/9) now with MRI showing R MCA distribution AIS.    No acute events overnight. Pt remains neurologically stable on exam. Head CT shows stable temporal evolution of infarct.    --Continue care per primary team.  --Continue goal sodium 145-155.  --Continue to elevate head of bed 30-45 degrees at all times.  --Neurosurgery will be signing off, please contact us with any questions or concerns.

## 2018-11-13 NOTE — ASSESSMENT & PLAN NOTE
-Area of cytotoxic cerebral edema identified when reviewing brain imaging in the territory of the right middle cerebral artery. There is no mass effect associated with it. We will continue to monitor the patients clinical exam for any worsening of symptoms which may indicate expansion of the stroke or the area of the edema resulting in the clinical change. The pattern is suggestive of embolic etiology.    Patient on hemicrani watch, neurologically stable but has new midline shift and mass effect

## 2018-11-13 NOTE — PROGRESS NOTES
[facility]  Neurosurgery  Progress Note    Subjective:     History of Present Illness: Mr Pablo is a 43M with history of HTN that [place] [facility] [place] s/p tPA for R M1 occlusion, was taken for thrombectomy with TICI 2B recanalization. 24 hour MRI after stroke showed R MCA territory stroke and early signs of edema. Patient denies anticoagulation/antiplatelet medications. Neurosurgery consulted for hemicraniectomy watch        Post-Op Info:  * No surgery found *             Medications:  Continuous Infusions:  Scheduled Meds:   albuterol-ipratropium  3 mL Nebulization Q6H WAKE    amLODIPine  10 mg Oral Daily    aspirin  81 mg Oral Daily    atorvastatin  40 mg Oral Daily    heparin (porcine)  5,000 Units Subcutaneous Q8H    hydrALAZINE  50 mg Oral Q8H    lisinopril  40 mg Oral Daily     PRN Meds:dextrose 50%, glucagon (human recombinant), hydrALAZINE, magnesium oxide, magnesium oxide, potassium chloride 10%, potassium chloride 10%, potassium chloride 10%, potassium, sodium phosphates, potassium, sodium phosphates, potassium, sodium phosphates, sodium chloride 0.9%     Review of Systems    Objective:     Weight: 100.3 kg (221 lb 1.9 oz)  Body mass index is 33.62 kg/m².  Vital Signs (Most Recent):  Temp: 98.6 °F (37 °C) (11/13/18 0300)  Pulse: 88 (11/13/18 0600)  Resp: (!) 31 (11/13/18 0600)  BP: 108/85 (11/13/18 0500)  SpO2: 95 % (11/13/18 0600) Vital Signs (24h Range):  Temp:  [98.1 °F (36.7 °C)-98.8 °F (37.1 °C)] 98.6 °F (37 °C)  Pulse:  [] 88  Resp:  [16-99] 31  SpO2:  [94 %-99 %] 95 %  BP: (101-108)/(82-85) 108/85  Arterial Line BP: ()/(69-92) 132/82                      Male External Urinary Catheter 11/09/18 0715 Large (Active)   Collection Container Urimeter 11/11/2018  3:05 PM   Securement Method secured to top of thigh w/ adhesive device 11/11/2018  3:05 PM   Skin no redness;no breakdown 11/11/2018  3:05 PM   Tolerance no signs/symptoms of discomfort 11/11/2018   3:05 PM   Output (mL) 300 mL 11/11/2018  2:00 PM   Catheter Change Date 11/11/18 11/11/2018  3:05 PM   Catheter Change Time 1000 11/11/2018  3:05 PM       Neurosurgery Physical Exam    AAOx3  L facial droop, mild dysarthria  R gaze deviation, PERRL  RUE/RLE 5/5 throughout, follows commands  LUE/LLE plegic  No sensation LUE/LLE      Significant Labs:  Recent Labs   Lab 11/12/18 0128 11/13/18  0143    100    137   K 4.1 4.3    103   CO2 24 25   BUN 14 20   CREATININE 0.8 0.9   CALCIUM 9.3 9.2   MG 2.3 2.4     Recent Labs   Lab 11/12/18 0128 11/13/18 0143   WBC 8.61 7.56   HGB 12.3* 12.5*   HCT 36.9* 39.1*    242     No results for input(s): LABPT, INR, APTT in the last 48 hours.  Microbiology Results (last 7 days)     ** No results found for the last 168 hours. **        All pertinent labs from the last 24 hours have been reviewed.    Significant Diagnostics:  I have reviewed all pertinent imaging results/findings within the past 24 hours.    Assessment/Plan:     Cytotoxic cerebral edema    43M with R MCA M1 LVO s/p tPA and trombectomy (11/9) now with MRI showing R MCA distribution AIS.    No acute events overnight. Pt remains neurologically stable on exam. Head CT shows stable temporal evolution of infarct.    --Continue care per primary team.  --Continue goal sodium 145-155.  --Continue to elevate head of bed 30-45 degrees at all times.  --Neurosurgery will be signing off, please contact us with any questions or concerns.             Rakan Renteria MD  Neurosurgery  [facility]

## 2018-11-13 NOTE — PT/OT/SLP PROGRESS
"Speech Language Pathology Treatment    Patient Name:  Tyree Pablo   MRN:  [id number]  Admitting Diagnosis: Embolic stroke involving right middle cerebral artery    Recommendations:                 General Recommendations:  Dysphagia therapy, Speech/language therapy and Cognitive-linguistic therapy  Diet recommendations:  Mechanical soft, Liquid Diet Level: Thin   Aspiration Precautions: 1 bite/sip at a time, Alternating bites/sips, Check for pocketing/oral residue, Meds whole 1 at a time, Monitor for s/s of aspiration and Strict aspiration precautions   General Precautions: Standard, aspiration, fall, dental soft  Communication strategies:  provide increased time to answer and go to room if call light pushed    Subjective     "It's after one now isn't it?" Pt with difficulty using clock in room 2/2 L neglect.   "He's been eating well."       Pain/Comfort:  · Pain Rating 1: 0/10  · Pain Rating Post-Intervention 1: 0/10    Objective:     Has the patient been evaluated by SLP for swallowing?   Yes  Keep patient NPO? No   Current Respiratory Status: room air      Pt seen bedside with mother present.  Nursing requesting clarification on best route of administering medications.  HOB raised upright for safety.  mother reports good intake and denied pocketing with solids.  Pt observed with 1 alyse cracker and approx 2oz of thin via straw.  Mild pocketing of solids noted in L buccal cavity.  Multiple verbal cues and liquid wash to clear.  No overt s/s aspiration noted.  Education provided on importance of using compensatory strategies to clear any stasis with meds and meals, including oral care following meals as needed.  Pt and mother verbalized understanding.  Pt required min-mod A to attend to L side of clock located on R side fo room.  Mod-max cues to visually attend to SLP on L.  Simple scanning task for 10 stimuli completed with 40% accy indptly and 90% given max cues including edgeness strategy and barrier line.  " Importance of L attn on safety, compensatory strategies and functional activities/stimuli to target L attn reviewed.  Pt did not respond though mother verbalized understanding.  5-12 items stated per concrete cat with min A.  Decreased sustained attn to task noted.  POC reviewed with pt and mother who verbalized agreement. White board updated.            Assessment:     Tyree Pablo is a 43 y.o. male with an SLP diagnosis of Dysphagia, Dysarthria, Cognitive-Linguistic Impairment and Visio-Spatial Impairment.      Goals:   Multidisciplinary Problems     SLP Goals        Problem: SLP Goal    Goal Priority Disciplines Outcome   SLP Goal     SLP Ongoing (interventions implemented as appropriate)   Description:  Speech Language Pathology Goals  Goals expected to be met by 11/19:  1. Patient will tolerate a dental soft diet and thin liquids with no overt signs of airway compromise.   2. Patient will complete OME's x10 each for improved lingual/labial strength and ROM.  3. Patient will demonstrate sustained attention to therapy tasks for 2 min with no cues.   4. Patient will provide 12+ items within concrete category in 1 minute indep.   5. Patient will complete mental manipulation tasks with 80% acc indep.   6. Patient will complete high level problem solving/reasoning tasks with 80% acc indep.   7. Patient will complete visual spatial/tracking tasks to improve left neglect given mod A.   7. Patient will participate in further assessments regarding reading and writing.       Goals due 11/16  1.  [facility] with thin liquids with no s/s of aspiration  2.  Participate in speech language evaluation                     Plan:     · Patient to be seen:  4 x/week   · Plan of Care expires:  12/07/18  · Plan of Care reviewed with:  patient, mother   · SLP Follow-Up:  Yes       Discharge recommendations:  rehabilitation facility   Barriers to Discharge:  Level of Skilled Assistance Needed      Time Tracking:      SLP Treatment Date:   11/13/18  Speech Start Time:  1108  Speech Stop Time:  1128     Speech Total Time (min):  20 min    Billable Minutes: Speech Therapy Individual 12 and Treatment Swallowing Dysfunction 8    MARC Lovelace, CCC-SLP  11/13/2018

## 2018-11-14 PROBLEM — T46.4X5A ANGIOEDEMA DUE TO ANGIOTENSIN CONVERTING ENZYME INHIBITOR (ACE-I): Status: ACTIVE | Noted: 2018-11-14

## 2018-11-14 PROBLEM — T78.3XXA ANGIOEDEMA DUE TO ANGIOTENSIN CONVERTING ENZYME INHIBITOR (ACE-I): Status: ACTIVE | Noted: 2018-11-14

## 2018-11-14 LAB
ANION GAP SERPL CALC-SCNC: 10 MMOL/L
BASOPHILS # BLD AUTO: 0.04 K/UL
BASOPHILS NFR BLD: 0.4 %
BUN SERPL-MCNC: 24 MG/DL
CALCIUM SERPL-MCNC: 9.7 MG/DL
CHLORIDE SERPL-SCNC: 103 MMOL/L
CO2 SERPL-SCNC: 25 MMOL/L
CREAT SERPL-MCNC: 0.9 MG/DL
DIFFERENTIAL METHOD: ABNORMAL
EOSINOPHIL # BLD AUTO: 0 K/UL
EOSINOPHIL NFR BLD: 0.3 %
ERYTHROCYTE [DISTWIDTH] IN BLOOD BY AUTOMATED COUNT: 16.9 %
EST. GFR  (AFRICAN AMERICAN): >60 ML/MIN/1.73 M^2
EST. GFR  (NON AFRICAN AMERICAN): >60 ML/MIN/1.73 M^2
F2 GENE MUT ANL BLD/T: NORMAL
GLUCOSE SERPL-MCNC: 110 MG/DL
HCT VFR BLD AUTO: 42.7 %
HGB A2 MFR BLD HPLC: 2.8 %
HGB BLD-MCNC: 14.1 G/DL
HGB FRACT BLD ELPH-IMP: NORMAL
HGB FRACT BLD ELPH-IMP: NORMAL
IMM GRANULOCYTES # BLD AUTO: 0.02 K/UL
IMM GRANULOCYTES NFR BLD AUTO: 0.2 %
LYMPHOCYTES # BLD AUTO: 2 K/UL
LYMPHOCYTES NFR BLD: 22.4 %
MAGNESIUM SERPL-MCNC: 2.7 MG/DL
MCH RBC QN AUTO: 26.1 PG
MCHC RBC AUTO-ENTMCNC: 33 G/DL
MCV RBC AUTO: 79 FL
MONOCYTES # BLD AUTO: 0.6 K/UL
MONOCYTES NFR BLD: 7.2 %
NEUTROPHILS # BLD AUTO: 6.2 K/UL
NEUTROPHILS NFR BLD: 69.5 %
NRBC BLD-RTO: 0 /100 WBC
PHOSPHATE SERPL-MCNC: 4.6 MG/DL
PLATELET # BLD AUTO: 298 K/UL
PMV BLD AUTO: 11.7 FL
POCT GLUCOSE: 169 MG/DL (ref 70–110)
POTASSIUM SERPL-SCNC: 4.4 MMOL/L
RBC # BLD AUTO: 5.41 M/UL
SODIUM SERPL-SCNC: 138 MMOL/L
WBC # BLD AUTO: 8.91 K/UL

## 2018-11-14 PROCEDURE — 80048 BASIC METABOLIC PNL TOTAL CA: CPT

## 2018-11-14 PROCEDURE — 84100 ASSAY OF PHOSPHORUS: CPT

## 2018-11-14 PROCEDURE — 63600175 PHARM REV CODE 636 W HCPCS: Performed by: PSYCHIATRY & NEUROLOGY

## 2018-11-14 PROCEDURE — 94640 AIRWAY INHALATION TREATMENT: CPT

## 2018-11-14 PROCEDURE — 97112 NEUROMUSCULAR REEDUCATION: CPT

## 2018-11-14 PROCEDURE — 83735 ASSAY OF MAGNESIUM: CPT

## 2018-11-14 PROCEDURE — 85025 COMPLETE CBC W/AUTO DIFF WBC: CPT

## 2018-11-14 PROCEDURE — 25000003 PHARM REV CODE 250: Performed by: STUDENT IN AN ORGANIZED HEALTH CARE EDUCATION/TRAINING PROGRAM

## 2018-11-14 PROCEDURE — 97110 THERAPEUTIC EXERCISES: CPT

## 2018-11-14 PROCEDURE — 25000003 PHARM REV CODE 250: Performed by: PSYCHIATRY & NEUROLOGY

## 2018-11-14 PROCEDURE — 99233 SBSQ HOSP IP/OBS HIGH 50: CPT | Mod: ,,, | Performed by: PSYCHIATRY & NEUROLOGY

## 2018-11-14 PROCEDURE — 97530 THERAPEUTIC ACTIVITIES: CPT

## 2018-11-14 PROCEDURE — 25000242 PHARM REV CODE 250 ALT 637 W/ HCPCS: Performed by: STUDENT IN AN ORGANIZED HEALTH CARE EDUCATION/TRAINING PROGRAM

## 2018-11-14 PROCEDURE — 63600175 PHARM REV CODE 636 W HCPCS: Performed by: STUDENT IN AN ORGANIZED HEALTH CARE EDUCATION/TRAINING PROGRAM

## 2018-11-14 PROCEDURE — 97127 HC THERAPEUTIC INTVTN, COGN FUNCTION - OT: CPT

## 2018-11-14 PROCEDURE — 94761 N-INVAS EAR/PLS OXIMETRY MLT: CPT

## 2018-11-14 PROCEDURE — 20000000 HC ICU ROOM

## 2018-11-14 PROCEDURE — 25000003 PHARM REV CODE 250: Performed by: PHYSICIAN ASSISTANT

## 2018-11-14 PROCEDURE — 97535 SELF CARE MNGMENT TRAINING: CPT

## 2018-11-14 PROCEDURE — 99233 SBSQ HOSP IP/OBS HIGH 50: CPT | Mod: ,,, | Performed by: NURSE PRACTITIONER

## 2018-11-14 RX ORDER — DIPHENHYDRAMINE HCL 25 MG
CAPSULE ORAL
Status: COMPLETED
Start: 2018-11-14 | End: 2018-11-14

## 2018-11-14 RX ORDER — LABETALOL HCL 20 MG/4 ML
10 SYRINGE (ML) INTRAVENOUS EVERY 4 HOURS PRN
Status: DISCONTINUED | OUTPATIENT
Start: 2018-11-14 | End: 2018-11-22 | Stop reason: HOSPADM

## 2018-11-14 RX ORDER — DIPHENHYDRAMINE HCL 25 MG
25 CAPSULE ORAL ONCE
Status: COMPLETED | OUTPATIENT
Start: 2018-11-14 | End: 2018-11-14

## 2018-11-14 RX ORDER — GLUCAGON 1 MG
1 KIT INJECTION
Status: DISCONTINUED | OUTPATIENT
Start: 2018-11-14 | End: 2018-11-16

## 2018-11-14 RX ORDER — INSULIN ASPART 100 [IU]/ML
0-5 INJECTION, SOLUTION INTRAVENOUS; SUBCUTANEOUS EVERY 6 HOURS PRN
Status: DISCONTINUED | OUTPATIENT
Start: 2018-11-14 | End: 2018-11-22 | Stop reason: HOSPADM

## 2018-11-14 RX ADMIN — IPRATROPIUM BROMIDE AND ALBUTEROL SULFATE 3 ML: .5; 3 SOLUTION RESPIRATORY (INHALATION) at 07:11

## 2018-11-14 RX ADMIN — HYDRALAZINE HYDROCHLORIDE 100 MG: 50 TABLET ORAL at 09:11

## 2018-11-14 RX ADMIN — DIPHENHYDRAMINE HYDROCHLORIDE 25 MG: 25 CAPSULE ORAL at 12:11

## 2018-11-14 RX ADMIN — HYDRALAZINE HYDROCHLORIDE 100 MG: 50 TABLET ORAL at 05:11

## 2018-11-14 RX ADMIN — ASPIRIN 81 MG CHEWABLE TABLET 81 MG: 81 TABLET CHEWABLE at 10:11

## 2018-11-14 RX ADMIN — IPRATROPIUM BROMIDE AND ALBUTEROL SULFATE 3 ML: .5; 3 SOLUTION RESPIRATORY (INHALATION) at 01:11

## 2018-11-14 RX ADMIN — HEPARIN SODIUM 5000 UNITS: 5000 INJECTION, SOLUTION INTRAVENOUS; SUBCUTANEOUS at 02:11

## 2018-11-14 RX ADMIN — IPRATROPIUM BROMIDE AND ALBUTEROL SULFATE 3 ML: .5; 3 SOLUTION RESPIRATORY (INHALATION) at 08:11

## 2018-11-14 RX ADMIN — HEPARIN SODIUM 5000 UNITS: 5000 INJECTION, SOLUTION INTRAVENOUS; SUBCUTANEOUS at 09:11

## 2018-11-14 RX ADMIN — HEPARIN SODIUM 5000 UNITS: 5000 INJECTION, SOLUTION INTRAVENOUS; SUBCUTANEOUS at 05:11

## 2018-11-14 RX ADMIN — ATORVASTATIN CALCIUM 40 MG: 20 TABLET, FILM COATED ORAL at 10:11

## 2018-11-14 RX ADMIN — AMLODIPINE BESYLATE 10 MG: 10 TABLET ORAL at 10:11

## 2018-11-14 RX ADMIN — HYDRALAZINE HYDROCHLORIDE 100 MG: 50 TABLET ORAL at 02:11

## 2018-11-14 RX ADMIN — METHYLPREDNISOLONE SODIUM SUCCINATE 40 MG: 40 INJECTION, POWDER, FOR SOLUTION INTRAMUSCULAR; INTRAVENOUS at 06:11

## 2018-11-14 RX ADMIN — METHYLPREDNISOLONE SODIUM SUCCINATE 40 MG: 40 INJECTION, POWDER, FOR SOLUTION INTRAMUSCULAR; INTRAVENOUS at 12:11

## 2018-11-14 RX ADMIN — LABETALOL HYDROCHLORIDE 10 MG: 5 INJECTION, SOLUTION INTRAVENOUS at 10:11

## 2018-11-14 RX ADMIN — METHYLPREDNISOLONE SODIUM SUCCINATE 40 MG: 40 INJECTION, POWDER, FOR SOLUTION INTRAMUSCULAR; INTRAVENOUS at 11:11

## 2018-11-14 NOTE — PLAN OF CARE
Problem: Occupational Therapy Goal  Goal: Occupational Therapy Goal  Goals set 11/10 to be addressed for 7 days with expiration date, 11/17:  Patient will increase functional independence with ADLs by performing:    Patient will demonstrate rolling to the right with min assist.  Not met   Patient will demonstrate rolling to the left with modified independence.   Not met  Patient will demonstrate supine -sit with SBA.   Not met  Patient will demonstrate stand pivot transfers with min assist.   Not met  Patient will demonstrate grooming while standing with min assist.   Not met  Patient will demonstrate upper body dressing with mod assist while seated EOB.   Not met  Patient will demonstrate lower body dressing with mod assist while seated EOB.   Not met  Patient will demonstrate toileting with mod assist.   Not met  Patient's family / caregiver will demonstrate independence and safety with assisting patient with self-care skills and functional mobility.     Not met  Patient and/or patient's family will verbalize understanding of stroke prevention guidelines, personal risk factors and stroke warning signs via teachback method.  Not met          Goals remain appropriate.  GABRIELLE Bradshaw  11/14/2018

## 2018-11-14 NOTE — PLAN OF CARE
Problem: Patient Care Overview  Goal: Plan of Care Review  Outcome: Ongoing (interventions implemented as appropriate)  Plan of care reviewed with patient and family at 1400. Patient verbalized understanding. All questions and concerns addressed today. Patient remains free from injury and falls. No acute events. Patient progressing towards goal. Will continue to monitor. See flowsheet for full assessment and vitals throughout shift.

## 2018-11-14 NOTE — PT/OT/SLP PROGRESS
"Occupational Therapy   Treatment/ Goals updated    Name: Tyree Pablo  MRN: [id number]  Admitting Diagnosis:  Embolic stroke involving right middle cerebral artery       Recommendations:     Discharge Recommendations: rehabilitation facility  Discharge Equipment Recommendations:  3-in-1 commode, bath bench, wheelchair  Barriers to discharge:  Inaccessible home environment, Decreased caregiver support    Subjective   Patient:  "I didn't sleep last night.  This happens about twice a week at home where I am just restless the whole night."  Communicated with: Nurse prior to session.  Pain/Comfort:  · Pain Rating 1: 0/10  · Pain Rating Post-Intervention 1: 0/10    Patients cultural, spiritual, Restorationism conflicts given the current situation: Mormon    Objective:     Patient found with: bed alarm, blood pressure cuff, telemetry, SCD, pulse ox (continuous), peripheral IV, pressure relief boots, PICC line, Condom Catheter  Patient's mother asleep at bedside throughout the session.  General Precautions: Standard, aspiration, fall, vision impaired   Orthopedic Precautions:N/A   Braces: N/A     Occupational Performance:    Bed Mobility:    · Patient completed Rolling/Turning to Left with  stand by assistance  · Patient completed Rolling/Turning to Right with moderate assistance  · Patient completed Scooting/Bridging with moderate assistance  · Patient completed Supine to Sit with moderate assistance  · Patient completed Sit to Supine with moderate assistance     Functional Mobility/Transfers:  · Max-total assist with scooting along the EOB    Activities of Daily Living:  · Grooming: Moderate assist while seated EOB with assistance for attention to the left, to maintain left UE in weight bearing and for postural control   · Upper Body Dressing:  Moderate assist while seated EOB  · Lower Body Dressing:Total assist while seated EOB    Patient left supine with all lines intact, call button in reach and bed alarm on    [facility] 6 " Click:  [facility] Total Score: 11    Treatment & Education:  Patient education provided for stroke warning signs, prevention guidelines and personal risk factors.  Patient verbalizing understanding via teach back method.   Patient education provided on role of OT and need for rehab upon discharge.  Patient education provided on hemiplegic dressing technique, left UE weight bearing / positioning, postural control, and transfers.   Continued education, patient/ family training recommended. Patient alert and oriented x 3; able to follow 4/4 one step commands.  Patient attentive and interactive throughout the session. Addressed increasing attention to the left.  Able to identify 10/20 items on visual scanning task.  Difficulty with line bisection task and clock design.  Daily orientation provided.  PROM performed left UE one set x 10 rep in all planes of motion with stretches provided at end range; sustained stretch provided for external rotation.  Assistance and facilitation provided for upward rotation of the scapula during shoulder flexion and abduction.  Addressed left UE weight bearing while seated EOB and during transitional movement patterns.  SBA-min assist with postural control while seated EOB.  Oral motor exercises performed while seated EOB with left UE in weight bearing.  Positioning provided for midline orientation with bilateral UEs elevated and heels lifted off mattress.  Gentle cervical rotation provided.   Patient's functional status and disposition recommendation discussed with patient and nurse.  White board updated in patient's room.  OT asked if there were any other questions; patient/ family had no further questions.      Education:    Assessment:     Tyree Pablo is a 43 y.o. male with a medical diagnosis of Embolic stroke involving right middle cerebral artery.  He presents with performance deficits affecting function are weakness, impaired endurance, impaired sensation, impaired self care skills,  impaired functional mobilty, gait instability, impaired balance, visual deficits, impaired cognition, decreased coordination, decreased lower extremity function, decreased upper extremity function, decreased safety awareness, abnormal tone, decreased ROM, impaired fine motor, impaired coordination.      Rehab Prognosis:  Good; patient would benefit from acute skilled OT services to address these deficits and reach maximum level of function.       Plan:     Patient to be seen 5 x/week to address the above listed problems via self-care/home management, therapeutic activities, neuromuscular re-education, therapeutic exercises, sensory integration, cognitive retraining  · Plan of Care Expires: 12/08/18  · Plan of Care Reviewed with: patient    This Plan of care has been discussed with the patient who was involved in its development and understands and is in agreement with the identified goals and treatment plan    GOALS:   Multidisciplinary Problems     Occupational Therapy Goals        Problem: Occupational Therapy Goal    Goal Priority Disciplines Outcome Interventions   Occupational Therapy Goal     OT, PT/OT Ongoing (interventions implemented as appropriate)    Description:  Goals set 11/14 to be addressed for 7 days with expiration date, 11/21:  Patient will increase functional independence with ADLs by performing:    Patient will demonstrate rolling to the right with min assist.  Not met   Patient will demonstrate rolling to the left with modified independence.   Not met  Patient will demonstrate supine -sit with SBA.   Not met  Patient will demonstrate stand pivot transfers with min assist.   Not met  Patient will demonstrate grooming while standing with min assist.   Not met  Patient will demonstrate upper body dressing with mod assist while seated EOB.   Not met  Patient will demonstrate lower body dressing with mod assist while seated EOB.   Not met  Patient will demonstrate toileting with mod assist.   Not  met  Patient's family / caregiver will demonstrate independence and safety with assisting patient with self-care skills and functional mobility.     Not met  Patient and/or patient's family will verbalize understanding of stroke prevention guidelines, personal risk factors and stroke warning signs via teachback method.  Not met                            Time Tracking:     OT Date of Treatment: 11/14/18  OT Start Time: 0518  OT Stop Time: 0613  OT Total Time (min): 55 min    Billable Minutes:Self Care/Home Management 19  Therapeutic Exercise 10  Neuromuscular Re-education 16  Cognitive Retraining 10    GABRIELLE Bradshaw  11/14/2018

## 2018-11-14 NOTE — PLAN OF CARE
SW visited pt and pt's wife at bedside to get choices. Pt's wife stated the family had not selected any choices yet.     Marysol Kim LMSW  Neurocritical Care   [facility]  [id number]

## 2018-11-14 NOTE — ASSESSMENT & PLAN NOTE
-s/p tPA, thrombectomy  -Vascular Neurology following, appreciate recs  q1h neuro checks  -Goal SBP < 160  --hydralazine 100 mg q8h, amlodipine 10 mg qd  -11/10/18 MRI Brain w/o contrast showing large R MCA stroke   11/13 CTH evolving large R MCA distribution infarct, with small volume scattered petechial hemorrhage, mild mass effect, 2mm L MLS.  -Hemicrani watch w/ NSGY following--recommending Na 145-155  -Exam largely unchanged, bolus with hypertonic saline for exam worsening  -PT/OT/SLP following, appreciate recs  -Etiology still unclear--2D Echo w/ bubble contrast for PFO pending (No DVT on US BLE)   -Hypercoagulability panel: so far negative beta-glycoproteins, DRVVT; nml homocysteine

## 2018-11-14 NOTE — ASSESSMENT & PLAN NOTE
-Continue q1h neuro checks  -Hemicrani watch, bolus HTS for exam change  -NSGY following, appreciate recs  Goal Na 140-155

## 2018-11-14 NOTE — ASSESSMENT & PLAN NOTE
Angioedema on lips and tongue   Will continue to be monitored in the ICU  IV benadryl and solumedrol ordered

## 2018-11-14 NOTE — PT/OT/SLP PROGRESS
"     Physical Therapy Treatment    Patient Name: Tyree Pablo  MRN: [id number]   Diagnosis: Embolic stroke involving right middle cerebral artery    Recommendations:     Discharge Recommendations:  rehabilitation facility   Discharge Equipment Recommendations: (TBD)   Barriers to Discharge: decreased caregiver assistance, fall risk, safety concerns, increased caregiver bnurden    Plan:   During this hospitalization, the patient will be seen 5 x/week for gait training, therapeutic activities, therapeutic exercises, neuromuscular re-education to address impairments and functional mobility deficits.   · Plan of Care Expires: 12/10/18   Plan of Care Reviewed with: patient, mother    This plan of care has been discussed with the patient and/or family who were involved in its development and are in agreement with the identified goals and treatment plan.     Subjective   PT communicated with RN prior to therapy.     Patient comments/goals: "I said I was going to do more than yesterday. Am I getting better? Am I going to get better?"  Pain/Comfort:  · Pain Rating 1: 0/10  · Pain Rating Post-Intervention 1: 0/10    Recent Vital Signs: (Last documentation)  Temp: 98 °F (36.7 °C) (11/14/18 1100)  Pulse: 104 (11/14/18 1400)  Resp: (!) 41 (11/14/18 1400)  BP: (!) 157/93 (11/14/18 1400)  SpO2: 100 % (11/14/18 1400)     Objective:   General Precautions: aspiration, fall, vision impaired  Recent Surgery: * No surgery found *    The patient currently has bed alarm, pulse ox (continuous), telemetry, blood pressure cuff, Condom Catheter, SCD, pressure relief boots.    The patient was found supine in bed on the R side of the bed against the R bedrail.  R visual gaze preference but can visually track past midline with verbal cues.  He agreed to therapy.  Supine to sit performed into long sit d/t postioning in bed.  He participated in bed mobility, sitting balance, and transfer training.  RUE and RLE contraversive pushing behavior " emerged during sit to stand and static standing.  Additional midline orientation activities were performed in sitting and standing.     Functional Mobility:  Bed Mobility:   · Rolling Right: NT  · Rolling Left: NT  · Supine to Sit: max assistance into long sit and to progress toward EOB  · Sit to Supine: max assistance into R sidelying     Sitting Balance at Edge of Bed:  · Assistance Level Required: min assistance with LUE positioned in weight bearing; moderate assistance with no UE support  · Time: 20 minutes total (broken up by standing trials)  · Postural deviations noted: R visual gaze preference, R head turn, LUE hemiparesis (positioned in weight bearing), L inattention, L sided LOB  · PT Encouraged/facilitated: LUE positioning and support, verbal cues for midline gaze and balance  · Comments/Activities:  · Visual tracking past midline  · Neutral sitting balance  · Anterior and posterior weight shift with RUE at shoulder height to inhibit pushing after standing trials (when PT removed RUE from weight bearing, his RLE immediately abducted and began to pushing.  PT repositioned RLE in 90-90 position and blocked foot abduction)  · Exploring limits of stability carla-posterior  · Reaching tasks with RUE to encourage tracking past midline 5x      Transfers:   · Sit <> Stand Transfer:  7 trials with max assistance  · Pt exhibited RUE and RLE pushing behavior during transition movement causing L LOB > RUE positioned at shoulder height on bed rail (HOB elevated) on subsequent trials to improve safety and performance  · PT assisted patient with midline orientation during transfer, lift, anterior weight shift, max assistance to block L knee, max assistance to engage L hip  · PTA Student assisted with lateral stability (L sided LOB), and LUE support and weight bearing during transition and during standing    Standing  Pt stood 20 seconds, 45 seconds, 30 seconds with max assistance and RUE support on bed rail   · Pt  demonstrated activation of back extensors and hip extensors with prolonged standing and verbal cues for posture  · Excessive RLE abduction and extension (pushing behavior) with prolonged standing  · PT assisted patient with max assistance to block L knee, max assistance to engage L hip which improved to mod assistance, verbal cues for midline gaze   · PTA Student assisted with lateral stability (L sided LOB), and LUE support and weight bearing     Therapeutic Activities, Education, or Exercises:  PT discussed patient performance, progress, safety concerns and goals with patient and his mother.  Time was provided for active listening, discussion of health disposition, and discussion of safe discharge recommendations. Therapist answered questions to patient/familys satisfaction within scope of practice.  Patient and family are aware of patient's deficits and therapy progression. White board updated to reflect current level of assistance.    The patient was left supine in bed with all lines intact, call bell in reach, bed alarm armed, mother at bedside and RN notified.     FUNCTIONAL OUTCOME MEASURES:  Turning over in bed (including adjusting bedclothes, sheets and blankets)?: 2  Sitting down on and standing up from a chair with arms (e.g., wheelchair, bedside commode, etc.): 2  Moving from lying on back to sitting on the side of the bed?: 2  Moving to and from a bed to a chair (including a wheelchair)?: 1  Need to walk in hospital room?: 1  Climbing 3-5 steps with a railing?: 1  Basic Mobility Total Score: 9    Goals:     Multidisciplinary Problems     Physical Therapy Goals        Problem: Physical Therapy Goal    Goal Priority Disciplines Outcome Goal Variances Interventions   Physical Therapy Goal     PT, PT/OT Ongoing (interventions implemented as appropriate)     Description:  Goals to be met by: 2018     Patient will increase functional independence with mobility by performin. Supine to sit with  MInimal Assistance  2. Sit to supine with MInimal Assistance  3. Sit to stand transfer with Minimal Assistance  4. Bed to chair transfer with Minimal Assistance  5. Gait  x 50 feet with Moderate Assistance with or without appropriate AD.   6. Sitting at edge of bed x10 minutes with Contact Guard Assistance  7. Lower extremity exercise program x15 reps per handout, with assistance as needed                    Assessment:   Tyree Pablo is a 43 y.o. male admitted with a medical diagnosis of Embolic stroke involving right middle cerebral artery.  He is very motivated to participate in and improve with therapy.  Emerging RUE and RLE contraversive pushing was noticed during sit to stand transition and during static standing.  He responded well to verbal cues and repositioning.  Transfer to the chair was not performed d/t complexity of the transfer and level of assistance.        Problems/Impairments:  weakness, decreased upper extremity function, decreased lower extremity function, impaired balance, impaired endurance, impaired cardiopulmonary response to activity, impaired sensation, visual deficits, decreased safety awareness, impaired fine motor, impaired self care skills, impaired cognition, impaired functional mobilty, abnormal tone     Rehab Prognosis:  good. The patient would benefit from acute skilled PT services to address these deficits and maximize their functional independence.     Time Tracking:     PT Received On:  11/14/18  PT Start Time:   1330    PT Stop Time:  1408  PT Total Time (min): 38 min     Billable Minutes: Therapeutic Activity 10 and Neuromuscular Re-education 28     Val Guerra, PT  11/14/2018  [phone] (pager)

## 2018-11-14 NOTE — PLAN OF CARE
Problem: Physical Therapy Goal  Goal: Physical Therapy Goal  Goals to be met by: 2018     Patient will increase functional independence with mobility by performin. Supine to sit with MInimal Assistance  2. Sit to supine with MInimal Assistance  3. Sit to stand transfer with Minimal Assistance  4. Bed to chair transfer with Minimal Assistance  5. Gait  x 50 feet with Moderate Assistance with or without appropriate AD.   6. Sitting at edge of bed x10 minutes with Contact Guard Assistance  7. Lower extremity exercise program x15 reps per handout, with assistance as needed     Outcome: Ongoing (interventions implemented as appropriate)  Patient participated well in therapy.  POC and goals remain appropriate.  Please refer to the progress note for functional mobility.     Standing trials performed with PT. Patient not safe to transfer to chair on this date.     Val Guerra, PT  2018  [phone] (pager)

## 2018-11-14 NOTE — SUBJECTIVE & OBJECTIVE
Neurologic Chief Complaint: right MCA infarct    Subjective:     Interval History: Patient is seen for follow-up neurological assessment and treatment recommendations:RENNYON patient neurologically stable.  Repeat CT head showing 0.2 cm midline shift and mass effect.  NCC will continue to monitor cerebral edema.      HPI, Past Medical, Family, and Social History remains the same as documented in the initial encounter.     Review of Systems   Constitutional: Negative for chills and fever.   HENT: Positive for congestion. Negative for drooling.         Lip swelling   Respiratory: Negative for cough and shortness of breath.    Gastrointestinal: Negative for diarrhea and vomiting.   Skin: Negative for pallor and rash.   Psychiatric/Behavioral: Negative for agitation and behavioral problems.     Scheduled Meds:   albuterol-ipratropium  3 mL Nebulization Q6H WAKE    amLODIPine  10 mg Oral Daily    aspirin  81 mg Oral Daily    atorvastatin  40 mg Oral Daily    heparin (porcine)  5,000 Units Subcutaneous Q8H    hydrALAZINE  100 mg Oral Q8H    methylPREDNISolone sodium succinate  40 mg Intravenous Q6H     Continuous Infusions:  PRN Meds:dextrose 50%, glucagon (human recombinant), hydrALAZINE, magnesium oxide, magnesium oxide, potassium chloride 10%, potassium chloride 10%, potassium chloride 10%, potassium, sodium phosphates, potassium, sodium phosphates, potassium, sodium phosphates, sodium chloride 0.9%    Objective:     Vital Signs (Most Recent):  Temp: 98 °F (36.7 °C) (11/14/18 0701)  Pulse: 100 (11/14/18 1100)  Resp: (!) 22 (11/14/18 1100)  BP: 133/88 (11/14/18 1100)  SpO2: 97 % (11/14/18 1100)  BP Location: Left arm    Vital Signs Range (Last 24H):  Temp:  [97.6 °F (36.4 °C)-98.8 °F (37.1 °C)]   Pulse:  []   Resp:  [12-35]   BP: (120-179)/(65-96)   SpO2:  [94 %-100 %]   BP Location: Left arm    Physical Exam   Constitutional: He appears well-developed and well-nourished.   HENT:   Head: Normocephalic and  atraumatic.   +angioedema      Eyes: Pupils are equal, round, and reactive to light.   Left gaze palsy    Cardiovascular: Normal rate and regular rhythm.   Pulmonary/Chest: No respiratory distress.   Abdominal: He exhibits no distension. There is no guarding.   Musculoskeletal: He exhibits no edema or deformity.       Neurological Exam:   LOC: alert  Attention Span: Good   Language: mild expressive aphasia  Articulation: mild dysarthria  Orientation: Person, Place, Time   Visual Fields: Hemianopsia left  EOM (CN III, IV, VI): Palsy CN III: left  Pupils (CN II, III): PERRL  Motor: Arm left  Plegia 0/5  Leg left  Paresis: 1/5  Arm right  Normal 5/5  Leg right Normal 5/5  Sensation: Yunior-anesthesia left  Tone:flaccid in left upper extremity     Laboratory:  CMP:   Recent Labs   Lab 11/14/18  0147   CALCIUM 9.7      K 4.4   CO2 25      BUN 24*   CREATININE 0.9     CBC:   Recent Labs   Lab 11/14/18  0147   WBC 8.91   RBC 5.41   HGB 14.1   HCT 42.7      MCV 79*   MCH 26.1*   MCHC 33.0     Lipid Panel:   Recent Labs   Lab 11/09/18  0353   CHOL 201*   LDLCALC 141.4   HDL 35*   TRIG 123     Hgb A1C:   Recent Labs   Lab 11/09/18  0353   HGBA1C 5.9*     TSH:   Recent Labs   Lab 11/09/18  0353   TSH 1.560       Diagnostic Results     Brain Imaging   CT head 11/13/18  Evolving right MCA infarct with 0.2cm MLS  Mass effect upon the right lateral ventricle   Petechial hemorrhage     MRI brain 11/09/18    Large right MCA infarct   Cytotoxic cerebral edema  Petechial hemorrhage inside lesion     Vessel Imaging   Cerebral angiogram 11/09/18  Thromboaspiration of the m1 segment of right middle cerebral artery with TICI 3 reperfusion.      CTA MP 11/09/18  Abrupt distal m1 occlusion.    Cardiac Imaging   Echo 11/10/18  · Left ventricle ejection fraction is normal at 65%  · Normal LV diastolic function.  · RV systolic function is normal.  · Trace tricuspid regurgitation.  · Normal central venous pressure (3 mm  Hg).  · No pericardial effusion.

## 2018-11-14 NOTE — ASSESSMENT & PLAN NOTE
-Goal SBP < 160   -Echo w/ normal LVEF  -Continue amlodipine 10 mg qd  -Continue hydralazine 100 mg q8h  Echo: EF 60%

## 2018-11-14 NOTE — PROGRESS NOTES
[facility]  Vascular Neurology  Comprehensive Stroke Center  Progress Note    Assessment/Plan:     * Embolic stroke involving right middle cerebral artery    43 y.o. male with significant past medical history of obesity, HTN, tobacco abuse who was transferred from [facility] after receiving IV-tPA for acute left sided weakness.  Found to have R M1 occlusion of the right middle cerebral artery and taken to interventional radiology for thrombectomy.  Patient had successful thromboaspiration with TICI 3 reperfusion       MRI brain showed large right middle cerebral artery territory infarct.  Mild petechial hemorrhage.  Utox for multiple drugs of abuse including THC with amphetamine.  Amphetamines can cause abnormal heart rhythms and could be the etiology of stroke.  Patient does have other risk factors such as family history of early stroke, obesity, tobacco abuse, hypertension.  Echo with bubble 11/12: negative for intracardiac shunt    CT 11/13 Showing 0.2cm MLS shift and mass effect.  Neurologically stable    Antithrombotics: asa 81mg qd     Statins for secondary stroke prevention and hyperlipidemia, if present:   Statins: Atorvastatin- 40 mg daily     Aggressive risk factor modification: HTN, Smoking, Obesity, Drug use     Rehab efforts: PT/OT/SLP to evaluate and treat inpatient rehab     Diagnostics ordered/pending: hypercoagulable panel    VTE prophylaxis: SCDs. Heparin 5000 units q 8hours     BP parameters: Infarct: Post tPA, SBP <160         Abuse, drug or alcohol    Positive Utox screen for THC, amphetamine, opioids, benzodiazepines   Etiology of stroke        Cytotoxic cerebral edema    -Area of cytotoxic cerebral edema identified when reviewing brain imaging in the territory of the right middle cerebral artery. There is no mass effect associated with it. We will continue to monitor the patients clinical exam for any worsening of symptoms which may indicate  expansion of the stroke or the area of the edema resulting in the clinical change. The pattern is suggestive of embolic etiology.             Essential hypertension    -Stroke risk factor.  SBP<160  -Resume home meds when appropriate.     Angioedema due to angiotensin converting enzyme inhibitor (ACE-I)    Angioedema on lips and tongue   Will continue to be monitored in the ICU  IV benadryl and solumedrol ordered       Mixed hyperlipidemia    Risk factor for stroke     Atorvastatin 40mg qhs     Dysarthria    Due to stroke   SLP ordered      Tobacco abuse    -Stroke risk factor.  Encourage cessation.  -Nicotine patch prn.     S/P admn tPA in diff fac w/n last 24 hr bef adm to crnt fac    -Admitted to [facility] for close monitoring.     Acute left-sided weakness    Due stroke  PT, OT ordered           11/12/18 Investigating etiologies of stroke.  Hypercoagulable and echo with bubble ordered. Patient utox + for amphetamines, THC, benzo, opiates.     11/13/18 NAEON patient neurologically stable.  Repeat CT head showing 0.2 cm midline shift and mass effect.  NCC will continue to monitor cerebral edema.  11/14/18 Patient experiencing angioedema from lisinopril.  Will stay in in the ICU for intubation watch.  IV benadryl and solumedrol ordered     STROKE DOCUMENTATION   Acute Stroke Times   Last Known Normal Date: 11/08/18  Last Known Normal Time: 1930  Symptom Onset Date: 11/08/18  Symptom Onset Time: 2015  Stroke Team Called Date: 11/08/18  Stroke Team Called Time: 2212  Stroke Team Arrival Date: 11/08/18  Stroke Team Arrival Time: 2213  CT Interpretation Time: 2213(no acute findings)  Decision to Treat Time for Alteplase: 2228(tPA administered)  Decision to Treat Time for IR: 0347(no intervention at this time)    NIH Scale:  1a. Level Of Consciousness: 0-->Alert: keenly responsive  1b. LOC Questions: 0-->Answers both questions correctly  1c. LOC Commands: 0-->Performs both tasks correctly  2. Best Gaze: 1-->Partial  gaze palsy: gaze is abnormal in one or both eyes, but forced deviation or total gaze paresis is not present  3. Visual: 1-->Partial hemianopia  4. Facial Palsy: 1-->Minor paralysis (flattened nasolabial fold, asymmetry on smiling)  5a. Motor Arm, Left: 4-->No movement  5b. Motor Arm, Right: 0-->No drift: limb holds 90 (or 45) degrees for full 10 secs  6a. Motor Leg, Left: 4-->No movement  6b. Motor Leg, Right: 0-->No drift: leg holds 30 degree position for full 5 secs  7. Limb Ataxia: 0-->Absent  8. Sensory: 2-->Severe to total sensory loss: patient is not aware of being touched in the face, arm, and leg  9. Best Language: 1-->Mild-to-moderate aphasia: some obvious loss of fluency or facility of comprehension, without significant limitation on ideas expressed or form of expression. Reduction of speech and/or comprehension, however, makes conversation. . . (see row details)  10. Dysarthria: 1-->Mild-to-moderate dysarthria: patient slurs at least some words and, at worst, can be understood with some difficulty  11. Extinction and Inattention (formerly Neglect): 2-->Profound rito-inattention/extinction more than 1 modality  Total (NIH Stroke Scale): 17       Modified Treutlen Score: 0  Genny Coma Scale:    ABCD2 Score:    [id number] Score:   HAS -BLED Score:   ICH Score:   [employer] Classification:      Hemorrhagic change of an Ischemic Stroke: Does this patient have an ischemic stroke with hemorrhagic changes? Yes, Grading Scale: HI Type 1 (HI-1) = small petechiae along the margins of the infarct. Is this a symptomatic change?  No - Hemorrhage is not clinically significant     Neurologic Chief Complaint: right MCA infarct    Subjective:     Interval History: Patient is seen for follow-up neurological assessment and treatment recommendations:NAEON patient neurologically stable.  Repeat CT head showing 0.2 cm midline shift and mass effect.  NCC will continue to monitor cerebral edema.      HPI, Past Medical, Family,  and Social History remains the same as documented in the initial encounter.     Review of Systems   Constitutional: Negative for chills and fever.   HENT: Positive for congestion. Negative for drooling.         Lip swelling   Respiratory: Negative for cough and shortness of breath.    Gastrointestinal: Negative for diarrhea and vomiting.   Skin: Negative for pallor and rash.   Psychiatric/Behavioral: Negative for agitation and behavioral problems.     Scheduled Meds:   albuterol-ipratropium  3 mL Nebulization Q6H WAKE    amLODIPine  10 mg Oral Daily    aspirin  81 mg Oral Daily    atorvastatin  40 mg Oral Daily    heparin (porcine)  5,000 Units Subcutaneous Q8H    hydrALAZINE  100 mg Oral Q8H    methylPREDNISolone sodium succinate  40 mg Intravenous Q6H     Continuous Infusions:  PRN Meds:dextrose 50%, glucagon (human recombinant), hydrALAZINE, magnesium oxide, magnesium oxide, potassium chloride 10%, potassium chloride 10%, potassium chloride 10%, potassium, sodium phosphates, potassium, sodium phosphates, potassium, sodium phosphates, sodium chloride 0.9%    Objective:     Vital Signs (Most Recent):  Temp: 98 °F (36.7 °C) (11/14/18 0701)  Pulse: 100 (11/14/18 1100)  Resp: (!) 22 (11/14/18 1100)  BP: 133/88 (11/14/18 1100)  SpO2: 97 % (11/14/18 1100)  BP Location: Left arm    Vital Signs Range (Last 24H):  Temp:  [97.6 °F (36.4 °C)-98.8 °F (37.1 °C)]   Pulse:  []   Resp:  [12-35]   BP: (120-179)/(65-96)   SpO2:  [94 %-100 %]   BP Location: Left arm    Physical Exam   Constitutional: He appears well-developed and well-nourished.   HENT:   Head: Normocephalic and atraumatic.   +angioedema      Eyes: Pupils are equal, round, and reactive to light.   Left gaze palsy    Cardiovascular: Normal rate and regular rhythm.   Pulmonary/Chest: No respiratory distress.   Abdominal: He exhibits no distension. There is no guarding.   Musculoskeletal: He exhibits no edema or deformity.       Neurological Exam:   LOC:  alert  Attention Span: Good   Language: mild expressive aphasia  Articulation: mild dysarthria  Orientation: Person, Place, Time   Visual Fields: Hemianopsia left  EOM (CN III, IV, VI): Palsy CN III: left  Pupils (CN II, III): PERRL  Motor: Arm left  Plegia 0/5  Leg left  Paresis: 1/5  Arm right  Normal 5/5  Leg right Normal 5/5  Sensation: Yunior-anesthesia left  Tone:flaccid in left upper extremity     Laboratory:  CMP:   Recent Labs   Lab 11/14/18  0147   CALCIUM 9.7      K 4.4   CO2 25      BUN 24*   CREATININE 0.9     CBC:   Recent Labs   Lab 11/14/18  0147   WBC 8.91   RBC 5.41   HGB 14.1   HCT 42.7      MCV 79*   MCH 26.1*   MCHC 33.0     Lipid Panel:   Recent Labs   Lab 11/09/18  0353   CHOL 201*   LDLCALC 141.4   HDL 35*   TRIG 123     Hgb A1C:   Recent Labs   Lab 11/09/18  0353   HGBA1C 5.9*     TSH:   Recent Labs   Lab 11/09/18  0353   TSH 1.560       Diagnostic Results     Brain Imaging   CT head 11/13/18  Evolving right MCA infarct with 0.2cm MLS  Mass effect upon the right lateral ventricle   Petechial hemorrhage     MRI brain 11/09/18    Large right MCA infarct   Cytotoxic cerebral edema  Petechial hemorrhage inside lesion     Vessel Imaging   Cerebral angiogram 11/09/18  Thromboaspiration of the m1 segment of right middle cerebral artery with TICI 3 reperfusion.      CTA MP 11/09/18  Abrupt distal m1 occlusion.    Cardiac Imaging   Echo 11/10/18  · Left ventricle ejection fraction is normal at 65%  · Normal LV diastolic function.  · RV systolic function is normal.  · Trace tricuspid regurgitation.  · Normal central venous pressure (3 mm Hg).  · No pericardial effusion.             Fabiana Michael PA-C  Comprehensive Stroke Center  Department of Vascular Neurology   [facility]
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
## 2018-11-15 PROBLEM — I63.9 STROKE: Status: ACTIVE | Noted: 2018-11-15

## 2018-11-15 LAB
ANION GAP SERPL CALC-SCNC: 12 MMOL/L
BASOPHILS # BLD AUTO: 0.01 K/UL
BASOPHILS NFR BLD: 0.1 %
BUN SERPL-MCNC: 29 MG/DL
CALCIUM SERPL-MCNC: 9.9 MG/DL
CHLORIDE SERPL-SCNC: 102 MMOL/L
CO2 SERPL-SCNC: 24 MMOL/L
CREAT SERPL-MCNC: 0.9 MG/DL
DIFFERENTIAL METHOD: ABNORMAL
EOSINOPHIL # BLD AUTO: 0 K/UL
EOSINOPHIL NFR BLD: 0 %
ERYTHROCYTE [DISTWIDTH] IN BLOOD BY AUTOMATED COUNT: 16.9 %
EST. GFR  (AFRICAN AMERICAN): >60 ML/MIN/1.73 M^2
EST. GFR  (NON AFRICAN AMERICAN): >60 ML/MIN/1.73 M^2
GLUCOSE SERPL-MCNC: 150 MG/DL
HCT VFR BLD AUTO: 42.5 %
HGB BLD-MCNC: 13.7 G/DL
IMM GRANULOCYTES # BLD AUTO: 0.07 K/UL
IMM GRANULOCYTES NFR BLD AUTO: 0.6 %
LYMPHOCYTES # BLD AUTO: 0.8 K/UL
LYMPHOCYTES NFR BLD: 7 %
MAGNESIUM SERPL-MCNC: 3 MG/DL
MCH RBC QN AUTO: 25.7 PG
MCHC RBC AUTO-ENTMCNC: 32.2 G/DL
MCV RBC AUTO: 80 FL
MONOCYTES # BLD AUTO: 0.1 K/UL
MONOCYTES NFR BLD: 0.6 %
NEUTROPHILS # BLD AUTO: 10.9 K/UL
NEUTROPHILS NFR BLD: 91.7 %
NRBC BLD-RTO: 0 /100 WBC
PHOSPHATE SERPL-MCNC: 3.9 MG/DL
PLATELET # BLD AUTO: 305 K/UL
PMV BLD AUTO: 12.4 FL
POCT GLUCOSE: 125 MG/DL (ref 70–110)
POCT GLUCOSE: 135 MG/DL (ref 70–110)
POCT GLUCOSE: 137 MG/DL (ref 70–110)
POTASSIUM SERPL-SCNC: 4.5 MMOL/L
RBC # BLD AUTO: 5.34 M/UL
SODIUM SERPL-SCNC: 138 MMOL/L
WBC # BLD AUTO: 11.88 K/UL

## 2018-11-15 PROCEDURE — 20600001 HC STEP DOWN PRIVATE ROOM

## 2018-11-15 PROCEDURE — 92526 ORAL FUNCTION THERAPY: CPT

## 2018-11-15 PROCEDURE — 99233 SBSQ HOSP IP/OBS HIGH 50: CPT | Mod: ,,, | Performed by: NURSE PRACTITIONER

## 2018-11-15 PROCEDURE — 63600175 PHARM REV CODE 636 W HCPCS: Performed by: STUDENT IN AN ORGANIZED HEALTH CARE EDUCATION/TRAINING PROGRAM

## 2018-11-15 PROCEDURE — 92507 TX SP LANG VOICE COMM INDIV: CPT

## 2018-11-15 PROCEDURE — 83735 ASSAY OF MAGNESIUM: CPT

## 2018-11-15 PROCEDURE — 25000003 PHARM REV CODE 250: Performed by: STUDENT IN AN ORGANIZED HEALTH CARE EDUCATION/TRAINING PROGRAM

## 2018-11-15 PROCEDURE — 80048 BASIC METABOLIC PNL TOTAL CA: CPT

## 2018-11-15 PROCEDURE — 63600175 PHARM REV CODE 636 W HCPCS: Performed by: PSYCHIATRY & NEUROLOGY

## 2018-11-15 PROCEDURE — 84100 ASSAY OF PHOSPHORUS: CPT

## 2018-11-15 PROCEDURE — 25000003 PHARM REV CODE 250: Performed by: PSYCHIATRY & NEUROLOGY

## 2018-11-15 PROCEDURE — 94640 AIRWAY INHALATION TREATMENT: CPT

## 2018-11-15 PROCEDURE — 85025 COMPLETE CBC W/AUTO DIFF WBC: CPT

## 2018-11-15 PROCEDURE — 25000242 PHARM REV CODE 250 ALT 637 W/ HCPCS: Performed by: STUDENT IN AN ORGANIZED HEALTH CARE EDUCATION/TRAINING PROGRAM

## 2018-11-15 PROCEDURE — 99233 SBSQ HOSP IP/OBS HIGH 50: CPT | Mod: ,,, | Performed by: PSYCHIATRY & NEUROLOGY

## 2018-11-15 PROCEDURE — 94761 N-INVAS EAR/PLS OXIMETRY MLT: CPT

## 2018-11-15 RX ADMIN — METHYLPREDNISOLONE SODIUM SUCCINATE 40 MG: 40 INJECTION, POWDER, FOR SOLUTION INTRAMUSCULAR; INTRAVENOUS at 06:11

## 2018-11-15 RX ADMIN — HYDRALAZINE HYDROCHLORIDE 100 MG: 50 TABLET ORAL at 03:11

## 2018-11-15 RX ADMIN — HYDRALAZINE HYDROCHLORIDE 100 MG: 50 TABLET ORAL at 06:11

## 2018-11-15 RX ADMIN — ATORVASTATIN CALCIUM 40 MG: 20 TABLET, FILM COATED ORAL at 10:11

## 2018-11-15 RX ADMIN — HYDRALAZINE HYDROCHLORIDE 100 MG: 50 TABLET ORAL at 10:11

## 2018-11-15 RX ADMIN — IPRATROPIUM BROMIDE AND ALBUTEROL SULFATE 3 ML: .5; 3 SOLUTION RESPIRATORY (INHALATION) at 01:11

## 2018-11-15 RX ADMIN — IPRATROPIUM BROMIDE AND ALBUTEROL SULFATE 3 ML: .5; 3 SOLUTION RESPIRATORY (INHALATION) at 07:11

## 2018-11-15 RX ADMIN — HEPARIN SODIUM 5000 UNITS: 5000 INJECTION, SOLUTION INTRAVENOUS; SUBCUTANEOUS at 03:11

## 2018-11-15 RX ADMIN — ASPIRIN 81 MG CHEWABLE TABLET 81 MG: 81 TABLET CHEWABLE at 10:11

## 2018-11-15 RX ADMIN — HEPARIN SODIUM 5000 UNITS: 5000 INJECTION, SOLUTION INTRAVENOUS; SUBCUTANEOUS at 06:11

## 2018-11-15 RX ADMIN — AMLODIPINE BESYLATE 10 MG: 10 TABLET ORAL at 10:11

## 2018-11-15 RX ADMIN — HEPARIN SODIUM 5000 UNITS: 5000 INJECTION, SOLUTION INTRAVENOUS; SUBCUTANEOUS at 10:11

## 2018-11-15 NOTE — PROGRESS NOTES
0700- patient has significant swelling of top hector from apparent reaction to lisinopril yesterday; team is aware.    0800- Breakfast delivered     0815- mom feeding patient at bedside    0900- meds tolerated; patient was able to swallow pills 2 at a time per pt request    1040- [facility] rounding on patient; awaiting new orders.     1200- lunch arrived; 100% consumed    1300- pt at bedside    1400- L hand IV D/C    1500- lunch at bedside; 100% consumed.     1600- large BM    1800- ACHS acuchecks ordered.
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
## 2018-11-15 NOTE — PROGRESS NOTES
Pt received from ICU, vss, resting in bed , alert and oriented , left sided weakness noted, small amount residual swelling noted to lip from resolved  allergic reaction, no distress or s/s of allergic reaction at present.will continue to monitor, ..POC reviewed with pt  All questions and concerns addressed. Pt    verbalized understanding. Vital signs stable and Neuro assessment remains unchanged. Pt progressing towards goals. Individualized stroke book in view at bedside. For more info see flowsheets. Will continue to monitor.

## 2018-11-15 NOTE — SUBJECTIVE & OBJECTIVE
Neurologic Chief Complaint: right MCA infarct    Subjective:     Interval History: Patient is seen for follow-up neurological assessment and treatment recommendations:MAKENZIE, patient reports improvement in lip/tongue swelling, denies any difficulty breathing    HPI, Past Medical, Family, and Social History remains the same as documented in the initial encounter.     Review of Systems   Constitutional: Negative for chills and fever.   HENT: Positive for congestion. Negative for drooling.         Lip swelling   Respiratory: Negative for cough and shortness of breath.    Gastrointestinal: Negative for diarrhea and vomiting.   Skin: Negative for pallor and rash.   Neurological: Positive for facial asymmetry and weakness.   Psychiatric/Behavioral: Negative for behavioral problems.     Scheduled Meds:   albuterol-ipratropium  3 mL Nebulization Q6H WAKE    amLODIPine  10 mg Oral Daily    aspirin  81 mg Oral Daily    atorvastatin  40 mg Oral Daily    heparin (porcine)  5,000 Units Subcutaneous Q8H    hydrALAZINE  100 mg Oral Q8H     Continuous Infusions:  PRN Meds:dextrose 50%, dextrose 50%, glucagon (human recombinant), glucagon (human recombinant), hydrALAZINE, insulin aspart U-100, labetalol, magnesium oxide, magnesium oxide, potassium chloride 10%, potassium chloride 10%, potassium chloride 10%, potassium, sodium phosphates, potassium, sodium phosphates, potassium, sodium phosphates, sodium chloride 0.9%    Objective:     Vital Signs (Most Recent):  Temp: 98.7 °F (37.1 °C) (11/15/18 0701)  Pulse: 110 (11/15/18 1000)  Resp: (!) 22 (11/15/18 1000)  BP: (!) 150/70 (11/15/18 1000)  SpO2: 96 % (11/15/18 1000)  BP Location: Left arm    Vital Signs Range (Last 24H):  Temp:  [98.1 °F (36.7 °C)-99 °F (37.2 °C)]   Pulse:  []   Resp:  [12-98]   BP: (135-199)/(69-93)   SpO2:  [94 %-100 %]   BP Location: Left arm    Physical Exam   Constitutional: He appears well-developed and well-nourished.   HENT:   Head: Normocephalic  and atraumatic.   +angioedema      Cardiovascular: Normal rate and regular rhythm.   Pulmonary/Chest: Effort normal. No respiratory distress.   Skin: Skin is warm and dry.   Vitals reviewed.      Neurological Exam:   LOC: alert  Attention Span: Good   Language: No aphasia  Articulation: mild dysarthria  Orientation: Person, Place, Time   Visual Fields: Hemianopsia left  EOM (CN III, IV, VI): R gaze preference  Pupils (CN II, III): PERRL  Motor: Arm left  Plegia 0/5  Leg left  Paresis: 1/5  Arm right  Normal 5/5  Leg right Normal 5/5  Sensation: intact  Tone:flaccid in left upper extremity     Laboratory:  CMP:   Recent Labs   Lab 11/15/18  0320   CALCIUM 9.9      K 4.5   CO2 24      BUN 29*   CREATININE 0.9     CBC:   Recent Labs   Lab 11/15/18  0320   WBC 11.88   RBC 5.34   HGB 13.7*   HCT 42.5      MCV 80*   MCH 25.7*   MCHC 32.2     Lipid Panel:   Recent Labs   Lab 11/09/18  0353   CHOL 201*   LDLCALC 141.4   HDL 35*   TRIG 123     Hgb A1C:   Recent Labs   Lab 11/09/18  0353   HGBA1C 5.9*     TSH:   Recent Labs   Lab 11/09/18  0353   TSH 1.560       Diagnostic Results     Brain Imaging   CT head 11/13/18  Evolving right MCA infarct with 0.2cm MLS  Mass effect upon the right lateral ventricle   Petechial hemorrhage     MRI brain 11/09/18    Large right MCA infarct   Cytotoxic cerebral edema  Petechial hemorrhage inside lesion     Vessel Imaging   Cerebral angiogram 11/09/18  Thromboaspiration of the m1 segment of right middle cerebral artery with TICI 3 reperfusion.      CTA MP 11/09/18  Abrupt distal m1 occlusion.    Cardiac Imaging   Echo 11/10/18  · Left ventricle ejection fraction is normal at 65%  · Normal LV diastolic function.  · RV systolic function is normal.  · Trace tricuspid regurgitation.  · Normal central venous pressure (3 mm Hg).  · No pericardial effusion.

## 2018-11-15 NOTE — PLAN OF CARE
Problem: Patient Care Overview  Goal: Plan of Care Review  Outcome: Ongoing (interventions implemented as appropriate)  POC reviewed with pt at 0400. Pt verbalized understanding. Questions and concerns addressed. No acute events today. Pt progressing toward goals. Will continue to monitor. See flowsheets for full assessment and VS info.

## 2018-11-15 NOTE — ASSESSMENT & PLAN NOTE
-Continue q1h neuro checks  -Hemicrani watch,  -NSGY following, appreciate recs  Goal Na eunatremia

## 2018-11-15 NOTE — PT/OT/SLP PROGRESS
"Speech Language Pathology Treatment    Patient Name:  Tyree Pablo   MRN:  [id number]  Admitting Diagnosis: Embolic stroke involving right middle cerebral artery    Recommendations:                 General Recommendations:  Dysphagia therapy, Speech/language therapy and Cognitive-linguistic therapy  Diet recommendations:  Mechanical soft, Liquid Diet Level: Thin   Aspiration Precautions: 1 bite/sip at a time, Alternating bites/sips, Check for pocketing/oral residue, Feed only when awake/alert, HOB to 90 degrees, Monitor for s/s of aspiration and Strict aspiration precautions   General Precautions: Standard, aspiration, fall, dental soft, vision impaired  Communication strategies:  none    Subjective     "What about red meat? Can I have that when I leave?"       Pain/Comfort:  · Pain Rating 1: 0/10  · Pain Rating Post-Intervention 1: 0/10    Objective:     Has the patient been evaluated by SLP for swallowing?   Yes  Keep patient NPO? No   Current Respiratory Status: room air      Pt seen bedside, alert and cooperative.  Pt with improved visual attn to SLP on L spontaneously today though verbal cues required for consistency.  Pt's lunch tray the bedside.  SLP assisted with set-up of tray for ongoing assessment of diet tolerance and as functional activity to target L attn and sustained attn.  Pt with improved interaction with SLP today with improved topic initiation and topic maintenance.  Attention to task remains moderately impaired as mod verbal/tactile cues required for pt to maintain attn to feeding task.  Mod-max cues required to locate items at midline/L side of tray.  Frequent repetition of strategies required though pt did appear to utilize SLP to orient to L with increased L-R scanning for items.  Frequent verbal cues required for pt to decrease bites/sip size and clear oral cavity prior to next presentation.  Anterior loss of liquids and solids noted from L.  Mild pocketing of solids cleared with verbal cues " for lingual sweep and liquid wash.  No overt s/s aspiration.  Ongoing education on importance of participating in functional tasks such as feeding to increase independence as well as swallow precs and L attn strategies reinforced.  Pt and family member indicated understanding.  Education to be ongoing pt continued to require considerable amount of cuing.      Assessment:     Tyree Pablo is a 43 y.o. male with an SLP diagnosis of Dysphagia, Dysarthria, Cognitive-Linguistic Impairment and Visio-Spatial Impairment.      Goals:   Multidisciplinary Problems     SLP Goals        Problem: SLP Goal    Goal Priority Disciplines Outcome   SLP Goal     SLP Ongoing (interventions implemented as appropriate)   Description:  Speech Language Pathology Goals  Goals expected to be met by 11/19:  1. Patient will tolerate a dental soft diet and thin liquids with no overt signs of airway compromise.   2. Patient will complete OME's x10 each for improved lingual/labial strength and ROM.  3. Patient will demonstrate sustained attention to therapy tasks for 2 min with no cues.   4. Patient will provide 12+ items within concrete category in 1 minute indep.   5. Patient will complete mental manipulation tasks with 80% acc indep.   6. Patient will complete high level problem solving/reasoning tasks with 80% acc indep.   7. Patient will complete visual spatial/tracking tasks to improve left neglect given mod A.   7. Patient will participate in further assessments regarding reading and writing.       Goals due 11/16  1.  Tolerate trials of mech soft diet with thin liquids with no s/s of aspiration  2.  Participate in speech language evaluation                     Plan:     · Patient to be seen:  4 x/week   · Plan of Care expires:  12/07/18  · Plan of Care reviewed with:  patient, family   · SLP Follow-Up:  Yes       Discharge recommendations:  rehabilitation facility   Barriers to Discharge:  Level of Skilled Assistance Needed      Time  Tracking:     SLP Treatment Date:   11/15/18  Speech Start Time:  1207  Speech Stop Time:  1228     Speech Total Time (min):  21 min    Billable Minutes: Speech Therapy Individual 11 and Treatment Swallowing Dysfunction 10    MARC Lovelace, CCC-SLP  11/15/2018

## 2018-11-15 NOTE — PHYSICIAN QUERY
PT Name: Tyree Pablo  MR #: [id number]     Physician Query Form - Documentation Clarification      CDS/: Maritza Green RN, CDS               Contact information: [email or web address]    This form is a permanent document in the medical record.     Query Date: November 15, 2018    By submitting this query, we are merely seeking further clarification of documentation. Please utilize your independent clinical judgment when addressing the question(s) below.    The Medical record reflects the following:    Supporting Clinical Findings Location in Medical Record     --CT head 2mm MLS with mass effect         -Neuro exam unchanged          -Possible transfer to [facility] in am    --Patient is slightly more awake, alert this am.    --His CT head was notable for 2 mm midline shift with stable findings otherwise, now day 5 s/p stroke.  Plan to watch with q1h neuro checks for another  day prior to stepdown.  --Pt kept in ICU for frequent neuro checks. CT head w/ 2 mm MLS.      NCC POC Note 11/13 (Dr Judge)      NCC Note 11/13 (Dr Judge/Ades)     -- There is mild localized mass effect with interval development of 2-mm of leftward midline shift.  No evidence of hydrocephalus or new large intracranial hemorrhage.       CT 11/13                                                                            Doctor, Please specify diagnosis or diagnoses associated with above clinical findings.    Please further specify the 2mm MLS with mass effect  Diagnosis:     Provider Use Only    [x  ] Brain compression, clinically significant    [  ] Brain compression, not clinically significant    [  ] Other (please specify): ______________________                                                                                                              Clinically Undetermined

## 2018-11-15 NOTE — PLAN OF CARE
Problem: SLP Goal  Goal: SLP Goal  Speech Language Pathology Goals  Goals expected to be met by 11/19:  1. Patient will tolerate a dental soft diet and thin liquids with no overt signs of airway compromise.   2. Patient will complete OME's x10 each for improved lingual/labial strength and ROM.  3. Patient will demonstrate sustained attention to therapy tasks for 2 min with no cues.   4. Patient will provide 12+ items within concrete category in 1 minute indep.   5. Patient will complete mental manipulation tasks with 80% acc indep.   6. Patient will complete high level problem solving/reasoning tasks with 80% acc indep.   7. Patient will complete visual spatial/tracking tasks to improve left neglect given mod A.   7. Patient will participate in further assessments regarding reading and writing.       Goals due 11/16  1.  [facility] with thin liquids with no s/s of aspiration  2.  Participate in speech language evaluation    Outcome: Ongoing (interventions implemented as appropriate)  Goals remain appropriate, cont POC. MARC Lovelace, CCC/SLP  11/15/2018

## 2018-11-15 NOTE — PROGRESS NOTES
[facility]  Vascular Neurology  Comprehensive Stroke Center  Progress Note    Assessment/Plan:     * Embolic stroke involving right middle cerebral artery    43 y.o. male with significant past medical history of obesity, HTN, tobacco abuse who was transferred from [facility] after receiving IV-tPA for acute left sided weakness.  Found to have R M1 occlusion of the right middle cerebral artery and taken to interventional radiology for thrombectomy.  Patient had successful thromboaspiration with TICI 3 reperfusion       MRI brain showed large right middle cerebral artery territory infarct.  Mild petechial hemorrhage.  Utox for multiple drugs of abuse including THC with amphetamine.  Amphetamines can cause abnormal heart rhythms and could be the etiology of stroke.  Patient does have other risk factors such as family history of early stroke, obesity, tobacco abuse, hypertension.  Echo with bubble 11/12: negative for intracardiac shunt    CT 11/13 Showing 0.2cm MLS shift and mass effect.  Neurologically stable    Antithrombotics: asa 81mg qd     Statins for secondary stroke prevention and hyperlipidemia, if present:   Statins: Atorvastatin- 40 mg daily     Aggressive risk factor modification: HTN, Smoking, Obesity, Drug use     Rehab efforts: PT/OT/SLP to evaluate and treat inpatient rehab     Diagnostics ordered/pending: hypercoagulable panel    VTE prophylaxis: SCDs. Heparin 5000 units q 8hours     BP parameters: Infarct: Post tPA, SBP <160         Angioedema due to angiotensin converting enzyme inhibitor (ACE-I)    Angioedema on lips and tongue   Improved with benadryl and steroids        Mixed hyperlipidemia    Risk factor for stroke     Atorvastatin 40mg qhs     Dysarthria    Due to stroke   SLP ordered      Abuse, drug or alcohol    Positive Utox screen for THC, amphetamine, opioids, benzodiazepines   Etiology of stroke        Tobacco abuse    -Stroke risk  factor.  Encourage cessation.  -Nicotine patch prn.     Essential hypertension    -Stroke risk factor.  SBP<160  -Resume home meds when appropriate.     S/P admn tPA in diff fac w/n last 24 hr bef adm to crnt fac    -Admitted to NCC for close monitoring.     Cytotoxic cerebral edema    -Area of cytotoxic cerebral edema identified when reviewing brain imaging in the territory of the right middle cerebral artery. There is no mass effect associated with it. We will continue to monitor the patients clinical exam for any worsening of symptoms which may indicate expansion of the stroke or the area of the edema resulting in the clinical change. The pattern is suggestive of embolic etiology.             Acute left-sided weakness    Due stroke  PT, OT ordered           11/12/18 Investigating etiologies of stroke.  Hypercoagulable and echo with bubble ordered. Patient utox + for amphetamines, THC, benzo, opiates.     11/13/18 NAEON patient neurologically stable.  Repeat CT head showing 0.2 cm midline shift and mass effect.  NCC will continue to monitor cerebral edema.  11/14/18 Patient experiencing angioedema from lisinopril.  Will stay in in the ICU for intubation watch.  IV benadryl and solumedrol ordered   11/15/18 angioedema improved with steroids and benadryl    STROKE DOCUMENTATION   Acute Stroke Times   Last Known Normal Date: 11/08/18  Last Known Normal Time: 1930  Symptom Onset Date: 11/08/18  Symptom Onset Time: 2015  Stroke Team Called Date: 11/08/18  Stroke Team Called Time: 2212  Stroke Team Arrival Date: 11/08/18  Stroke Team Arrival Time: 2213  CT Interpretation Time: 2213(no acute findings)  Decision to Treat Time for Alteplase: 2228(tPA administered)  Decision to Treat Time for IR: 0347(no intervention at this time)    NIH Scale:  1a. Level Of Consciousness: 0-->Alert: keenly responsive  1b. LOC Questions: 0-->Answers both questions correctly  1c. LOC Commands: 0-->Performs both tasks correctly  2. Best  Gaze: 1-->Partial gaze palsy: gaze is abnormal in one or both eyes, but forced deviation or total gaze paresis is not present  3. Visual: 2-->Complete hemianopia  4. Facial Palsy: 2-->Partial paralysis (total or near-total paralysis of lower face)  5a. Motor Arm, Left: 4-->No movement  5b. Motor Arm, Right: 0-->No drift: limb holds 90 (or 45) degrees for full 10 secs  6a. Motor Leg, Left: 4-->No movement  6b. Motor Leg, Right: 0-->No drift: leg holds 30 degree position for full 5 secs  7. Limb Ataxia: 0-->Absent  8. Sensory: 0-->Normal: no sensory loss  9. Best Language: 0-->No aphasia: normal  10. Dysarthria: 1-->Mild-to-moderate dysarthria: patient slurs at least some words and, at worst, can be understood with some difficulty  11. Extinction and Inattention (formerly Neglect): 0-->No abnormality  Total (NIH Stroke Scale): 14       Modified [place] Score: 0  [place] Coma Scale:    ABCD2 Score:    [id number] Score:   HAS -BLED Score:   ICH Score:   [employer] Classification:      Hemorrhagic change of an Ischemic Stroke: Does this patient have an ischemic stroke with hemorrhagic changes? Yes, Grading Scale: HI Type 1 (HI-1) = small petechiae along the margins of the infarct. Is this a symptomatic change?  No - Hemorrhage is not clinically significant     Neurologic Chief Complaint: right MCA infarct    Subjective:     Interval History: Patient is seen for follow-up neurological assessment and treatment recommendations:MAKENZIE, patient reports improvement in lip/tongue swelling, denies any difficulty breathing    HPI, Past Medical, Family, and Social History remains the same as documented in the initial encounter.     Review of Systems   Constitutional: Negative for chills and fever.   HENT: Positive for congestion. Negative for drooling.         Lip swelling   Respiratory: Negative for cough and shortness of breath.    Gastrointestinal: Negative for diarrhea and vomiting.   Skin: Negative for pallor and rash.    Neurological: Positive for facial asymmetry and weakness.   Psychiatric/Behavioral: Negative for behavioral problems.     Scheduled Meds:   albuterol-ipratropium  3 mL Nebulization Q6H WAKE    amLODIPine  10 mg Oral Daily    aspirin  81 mg Oral Daily    atorvastatin  40 mg Oral Daily    heparin (porcine)  5,000 Units Subcutaneous Q8H    hydrALAZINE  100 mg Oral Q8H     Continuous Infusions:  PRN Meds:dextrose 50%, dextrose 50%, glucagon (human recombinant), glucagon (human recombinant), hydrALAZINE, insulin aspart U-100, labetalol, magnesium oxide, magnesium oxide, potassium chloride 10%, potassium chloride 10%, potassium chloride 10%, potassium, sodium phosphates, potassium, sodium phosphates, potassium, sodium phosphates, sodium chloride 0.9%    Objective:     Vital Signs (Most Recent):  Temp: 98.7 °F (37.1 °C) (11/15/18 0701)  Pulse: 110 (11/15/18 1000)  Resp: (!) 22 (11/15/18 1000)  BP: (!) 150/70 (11/15/18 1000)  SpO2: 96 % (11/15/18 1000)  BP Location: Left arm    Vital Signs Range (Last 24H):  Temp:  [98.1 °F (36.7 °C)-99 °F (37.2 °C)]   Pulse:  []   Resp:  [12-98]   BP: (135-199)/(69-93)   SpO2:  [94 %-100 %]   BP Location: Left arm    Physical Exam   Constitutional: He appears well-developed and well-nourished.   HENT:   Head: Normocephalic and atraumatic.   +angioedema      Cardiovascular: Normal rate and regular rhythm.   Pulmonary/Chest: Effort normal. No respiratory distress.   Skin: Skin is warm and dry.   Vitals reviewed.      Neurological Exam:   LOC: alert  Attention Span: Good   Language: No aphasia  Articulation: mild dysarthria  Orientation: Person, Place, Time   Visual Fields: Hemianopsia left  EOM (CN III, IV, VI): R gaze preference  Pupils (CN II, III): PERRL  Motor: Arm left  Plegia 0/5  Leg left  Paresis: 1/5  Arm right  Normal 5/5  Leg right Normal 5/5  Sensation: intact  Tone:flaccid in left upper extremity     Laboratory:  CMP:   Recent Labs   Lab 11/15/18  0320   CALCIUM  9.9      K 4.5   CO2 24      BUN 29*   CREATININE 0.9     CBC:   Recent Labs   Lab 11/15/18  0320   WBC 11.88   RBC 5.34   HGB 13.7*   HCT 42.5      MCV 80*   MCH 25.7*   MCHC 32.2     Lipid Panel:   Recent Labs   Lab 11/09/18  0353   CHOL 201*   LDLCALC 141.4   HDL 35*   TRIG 123     Hgb A1C:   Recent Labs   Lab 11/09/18  0353   HGBA1C 5.9*     TSH:   Recent Labs   Lab 11/09/18  0353   TSH 1.560       Diagnostic Results     Brain Imaging   CT head 11/13/18  Evolving right MCA infarct with 0.2cm MLS  Mass effect upon the right lateral ventricle   Petechial hemorrhage     MRI brain 11/09/18    Large right MCA infarct   Cytotoxic cerebral edema  Petechial hemorrhage inside lesion     Vessel Imaging   Cerebral angiogram 11/09/18  Thromboaspiration of the m1 segment of right middle cerebral artery with TICI 3 reperfusion.      CTA MP 11/09/18  Abrupt distal m1 occlusion.    Cardiac Imaging   Echo 11/10/18  · Left ventricle ejection fraction is normal at 65%  · Normal LV diastolic function.  · RV systolic function is normal.  · Trace tricuspid regurgitation.  · Normal central venous pressure (3 mm Hg).  · No pericardial effusion.                   Sophia Juarez PA-C  Comprehensive Stroke Center  Department of Vascular Neurology   [facility]

## 2018-11-16 LAB
ANION GAP SERPL CALC-SCNC: 10 MMOL/L
BASOPHILS # BLD AUTO: 0.03 K/UL
BASOPHILS NFR BLD: 0.2 %
BUN SERPL-MCNC: 37 MG/DL
CALCIUM SERPL-MCNC: 9.7 MG/DL
CHLORIDE SERPL-SCNC: 102 MMOL/L
CO2 SERPL-SCNC: 27 MMOL/L
CREAT SERPL-MCNC: 0.9 MG/DL
DIFFERENTIAL METHOD: ABNORMAL
EOSINOPHIL # BLD AUTO: 0 K/UL
EOSINOPHIL NFR BLD: 0 %
ERYTHROCYTE [DISTWIDTH] IN BLOOD BY AUTOMATED COUNT: 17 %
EST. GFR  (AFRICAN AMERICAN): >60 ML/MIN/1.73 M^2
EST. GFR  (NON AFRICAN AMERICAN): >60 ML/MIN/1.73 M^2
GLUCOSE SERPL-MCNC: 117 MG/DL
HCT VFR BLD AUTO: 40.5 %
HGB BLD-MCNC: 13.1 G/DL
IMM GRANULOCYTES # BLD AUTO: 0.09 K/UL
IMM GRANULOCYTES NFR BLD AUTO: 0.6 %
LYMPHOCYTES # BLD AUTO: 2.2 K/UL
LYMPHOCYTES NFR BLD: 14.2 %
MAGNESIUM SERPL-MCNC: 2.7 MG/DL
MCH RBC QN AUTO: 25.4 PG
MCHC RBC AUTO-ENTMCNC: 32.3 G/DL
MCV RBC AUTO: 79 FL
MONOCYTES # BLD AUTO: 1 K/UL
MONOCYTES NFR BLD: 6.6 %
NEUTROPHILS # BLD AUTO: 12 K/UL
NEUTROPHILS NFR BLD: 78.4 %
NRBC BLD-RTO: 0 /100 WBC
PHOSPHATE SERPL-MCNC: 3.6 MG/DL
PLATELET # BLD AUTO: 360 K/UL
PMV BLD AUTO: 12.5 FL
POCT GLUCOSE: 101 MG/DL (ref 70–110)
POTASSIUM SERPL-SCNC: 4 MMOL/L
RBC # BLD AUTO: 5.15 M/UL
SODIUM SERPL-SCNC: 139 MMOL/L
WBC # BLD AUTO: 15.31 K/UL

## 2018-11-16 PROCEDURE — 85025 COMPLETE CBC W/AUTO DIFF WBC: CPT

## 2018-11-16 PROCEDURE — 94640 AIRWAY INHALATION TREATMENT: CPT

## 2018-11-16 PROCEDURE — 25000003 PHARM REV CODE 250: Performed by: PSYCHIATRY & NEUROLOGY

## 2018-11-16 PROCEDURE — 84100 ASSAY OF PHOSPHORUS: CPT

## 2018-11-16 PROCEDURE — 80048 BASIC METABOLIC PNL TOTAL CA: CPT

## 2018-11-16 PROCEDURE — 97530 THERAPEUTIC ACTIVITIES: CPT

## 2018-11-16 PROCEDURE — 63600175 PHARM REV CODE 636 W HCPCS: Performed by: PSYCHIATRY & NEUROLOGY

## 2018-11-16 PROCEDURE — 25000003 PHARM REV CODE 250: Performed by: STUDENT IN AN ORGANIZED HEALTH CARE EDUCATION/TRAINING PROGRAM

## 2018-11-16 PROCEDURE — 63600175 PHARM REV CODE 636 W HCPCS: Performed by: STUDENT IN AN ORGANIZED HEALTH CARE EDUCATION/TRAINING PROGRAM

## 2018-11-16 PROCEDURE — 20600001 HC STEP DOWN PRIVATE ROOM

## 2018-11-16 PROCEDURE — 97112 NEUROMUSCULAR REEDUCATION: CPT

## 2018-11-16 PROCEDURE — 97535 SELF CARE MNGMENT TRAINING: CPT

## 2018-11-16 PROCEDURE — 25000003 PHARM REV CODE 250: Performed by: PHYSICIAN ASSISTANT

## 2018-11-16 PROCEDURE — 99233 SBSQ HOSP IP/OBS HIGH 50: CPT | Mod: ,,, | Performed by: PSYCHIATRY & NEUROLOGY

## 2018-11-16 PROCEDURE — 25000242 PHARM REV CODE 250 ALT 637 W/ HCPCS: Performed by: STUDENT IN AN ORGANIZED HEALTH CARE EDUCATION/TRAINING PROGRAM

## 2018-11-16 PROCEDURE — 83735 ASSAY OF MAGNESIUM: CPT

## 2018-11-16 PROCEDURE — 94761 N-INVAS EAR/PLS OXIMETRY MLT: CPT

## 2018-11-16 RX ORDER — ATORVASTATIN CALCIUM 20 MG/1
80 TABLET, FILM COATED ORAL DAILY
Status: DISCONTINUED | OUTPATIENT
Start: 2018-11-17 | End: 2018-11-22 | Stop reason: HOSPADM

## 2018-11-16 RX ADMIN — IPRATROPIUM BROMIDE AND ALBUTEROL SULFATE 3 ML: .5; 3 SOLUTION RESPIRATORY (INHALATION) at 07:11

## 2018-11-16 RX ADMIN — HEPARIN SODIUM 5000 UNITS: 5000 INJECTION, SOLUTION INTRAVENOUS; SUBCUTANEOUS at 09:11

## 2018-11-16 RX ADMIN — HYDRALAZINE HYDROCHLORIDE 10 MG: 20 INJECTION INTRAMUSCULAR; INTRAVENOUS at 05:11

## 2018-11-16 RX ADMIN — HEPARIN SODIUM 5000 UNITS: 5000 INJECTION, SOLUTION INTRAVENOUS; SUBCUTANEOUS at 05:11

## 2018-11-16 RX ADMIN — IPRATROPIUM BROMIDE AND ALBUTEROL SULFATE 3 ML: .5; 3 SOLUTION RESPIRATORY (INHALATION) at 01:11

## 2018-11-16 RX ADMIN — ASPIRIN 81 MG CHEWABLE TABLET 81 MG: 81 TABLET CHEWABLE at 08:11

## 2018-11-16 RX ADMIN — HEPARIN SODIUM 5000 UNITS: 5000 INJECTION, SOLUTION INTRAVENOUS; SUBCUTANEOUS at 01:11

## 2018-11-16 RX ADMIN — HYDRALAZINE HYDROCHLORIDE 100 MG: 50 TABLET ORAL at 01:11

## 2018-11-16 RX ADMIN — AMLODIPINE BESYLATE 10 MG: 10 TABLET ORAL at 08:11

## 2018-11-16 RX ADMIN — HYDRALAZINE HYDROCHLORIDE 125 MG: 50 TABLET ORAL at 09:11

## 2018-11-16 RX ADMIN — HYDRALAZINE HYDROCHLORIDE 100 MG: 50 TABLET ORAL at 05:11

## 2018-11-16 RX ADMIN — ATORVASTATIN CALCIUM 40 MG: 20 TABLET, FILM COATED ORAL at 08:11

## 2018-11-16 RX ADMIN — IPRATROPIUM BROMIDE AND ALBUTEROL SULFATE 3 ML: .5; 3 SOLUTION RESPIRATORY (INHALATION) at 08:11

## 2018-11-16 NOTE — SUBJECTIVE & OBJECTIVE
Neurologic Chief Complaint: left sided weakness    Subjective:     Interval History: Patient is seen for follow-up neurological assessment and treatment recommendations:     MAKENZIE. Patient working with speech therapy at bedside. He continues to have left sided weakness. Tolerating diet and voiding appropriately. Pending rehab placement. Mom at bedside.     HPI, Past Medical, Family, and Social History remains the same as documented in the initial encounter.     Review of Systems   Negative for lethargy, seizure, or fevers.  Positive for weakness, speech difficulty, and vision deficit.     Scheduled Meds:   albuterol-ipratropium  3 mL Nebulization Q6H WAKE    amLODIPine  10 mg Oral Daily    aspirin  81 mg Oral Daily    atorvastatin  40 mg Oral Daily    heparin (porcine)  5,000 Units Subcutaneous Q8H    hydrALAZINE  100 mg Oral Q8H     Continuous Infusions:  PRN Meds:dextrose 50%, glucagon (human recombinant), hydrALAZINE, insulin aspart U-100, labetalol, sodium chloride 0.9%    Objective:     Vital Signs (Most Recent):  Temp: 96.3 °F (35.7 °C) (11/16/18 1209)  Pulse: 79 (11/16/18 1323)  Resp: 18 (11/16/18 1323)  BP: 133/81 (11/16/18 1209)  SpO2: 99 % (11/16/18 1323)  BP Location: Right arm    Vital Signs Range (Last 24H):  Temp:  [96.3 °F (35.7 °C)-98.5 °F (36.9 °C)]   Pulse:  []   Resp:  [18-20]   BP: (133-191)/(74-92)   SpO2:  [95 %-99 %]   BP Location: Right arm    Physical Exam  Vital signs: reviewed above.   Constitutional: well-developed, no acute distress  Cardiovascular: regular rate and rhythm  Resp: normal respirations, not labored, no accessory muscles used  Abdomen: soft, non-distended, not tender to palpation  Pulses: palpable distal pulses   Skin: warm, dry and intact, no rashes  Angioedema of lips have significantly improved.   Neurological  GCS: Motor: 6/Verbal: 5/Eyes: 4 GCS Total: 15  Mental Status: Awake, Alert, Oriented x 4  Follows commands   Mild dysarthria, no aphasia  Good  attention span  Head: normocephalic, atraumatic       Visual Fields:  complete hemianopsia on left       EOM (CN III, IV, VI): right gaze preference       Pupils (CN II, III): PERRL        Facial Sensation (CN V): Normal       Facial Movement (CN VII): left lower facial weakness  Moves all right extremities with good strength with no drift on all right extremities  Left side 0/5  Intact to light touch throughout  Normal tone throughout except LUE flaccid.         Laboratory:  CMP:   Recent Labs   Lab 11/16/18  0648   CALCIUM 9.7      K 4.0   CO2 27      BUN 37*   CREATININE 0.9     CBC:   Recent Labs   Lab 11/16/18  0648   WBC 15.31*   RBC 5.15   HGB 13.1*   HCT 40.5   *   MCV 79*   MCH 25.4*   MCHC 32.3     Lipid Panel: No results for input(s): CHOL, LDLCALC, HDL, TRIG in the last 168 hours.  Coagulation: No results for input(s): PT, INR, APTT in the last 168 hours.  Platelet Aggregation Study: No results for input(s): PLTAGG, PLTAGINTERP, PLTAGREGLACO, ADPPLTAGGREG in the last 168 hours.  Hgb A1C: No results for input(s): HGBA1C in the last 168 hours.  TSH: No results for input(s): TSH in the last 168 hours.           Diagnostic Results     Brain Imaging   CT head 11/13/18  Evolving right MCA infarct with 0.2cm MLS  Mass effect upon the right lateral ventricle   Petechial hemorrhage      MRI brain 11/09/18    Large right MCA infarct   Cytotoxic cerebral edema  Petechial hemorrhage inside lesion      Vessel Imaging   Cerebral angiogram 11/09/18  Thromboaspiration of the m1 segment of right middle cerebral artery with TICI 3 reperfusion.       CTA MP 11/09/18  Abrupt distal m1 occlusion.     Cardiac Imaging   Echo 11/10/18  · Left ventricle ejection fraction is normal at 65%  · Normal LV diastolic function.  · RV systolic function is normal.  · Trace tricuspid regurgitation.  · Normal central venous pressure (3 mm Hg).  · No pericardial effusion.

## 2018-11-16 NOTE — PLAN OF CARE
Problem: Physical Therapy Goal  Goal: Physical Therapy Goal  Goals to be met by: 2018     Patient will increase functional independence with mobility by performin. Supine to sit with MInimal Assistance  2. Sit to supine with MInimal Assistance  3. Sit to stand transfer with Minimal Assistance  4. Bed to chair transfer with Minimal Assistance  5. Gait  x 50 feet with Moderate Assistance with or without appropriate AD.   6. Sitting at edge of bed x10 minutes with Contact Guard Assistance  7. Lower extremity exercise program x15 reps per handout, with assistance as needed     Outcome: Ongoing (interventions implemented as appropriate)  Patient participated well in therapy.  POC and goals remain appropriate.  Please refer to the progress note for functional mobility.     Recommend transfers with therapy only at this time.    Val Guerra, PT  2018  [phone] (pager)

## 2018-11-16 NOTE — ASSESSMENT & PLAN NOTE
43 y.o. male with significant past medical history of obesity, HTN, tobacco abuse who was transferred from [facility] after receiving IV-tPA for acute left sided weakness.  Found to have R M1 occlusion of the right middle cerebral artery and taken to interventional radiology for thrombectomy.  Patient had successful thromboaspiration with TICI 3 reperfusion       MRI brain showed large right middle cerebral artery territory infarct.  Mild petechial hemorrhage.  Utox for multiple drugs of abuse including THC with amphetamine.  Amphetamines can cause abnormal heart rhythms and could be the etiology of stroke.  Patient does have other risk factors such as family history of early stroke, obesity, tobacco abuse, hypertension.  Echo with bubble 11/12: negative for intracardiac shunt. CT 11/13 Showing 0.2cm MLS shift and mass effect.        NAEON 11/16/18. Patient neurologically stable on exam with continued left sided weakness. -191 and HR  in last 24 hour. Discussed with Dr. Dunbar, continue amlodipine 10mg and increase hydralazine to 150mg TID. Plan to increase Lipitor to 80mg daily per Dr. Kapoor since LDL 14.  hypercoagulable panel overall negative, Lipoprotein A elevated at 134. Pending Rehab placement.       Antithrombotics: asa 81mg qd     Statins for secondary stroke prevention and hyperlipidemia, if present:   Statins: Atorvastatin- 80 mg daily     Aggressive risk factor modification: HTN, Smoking, Obesity, Drug use     Rehab efforts: PT/OT/SLP to evaluate and treat inpatient rehab     Diagnostics ordered/pending: none    VTE prophylaxis: SCDs. Heparin 5000 units q 8hours     BP parameters: Infarct: Post tPA, SBP <160

## 2018-11-16 NOTE — PLAN OF CARE
Covering SW presented to Pts bedside where Pt was accompanied by his mother Stacy. Pt. Was sleeping at the time of SW arrival.  Pt's mother stated Pts children have the Rehab list and have not made facility decisions.  Pt's mother stated she would call Pt's children obtain choices and contact SW.  SW will continue to follow and assist.    Parker Munizer, JOHNNY

## 2018-11-16 NOTE — ASSESSMENT & PLAN NOTE
-Admitted to [facility] for close monitoring.  -Now stepped down to Vascular neurology service

## 2018-11-16 NOTE — PLAN OF CARE
Problem: Occupational Therapy Goal  Goal: Occupational Therapy Goal  Goals set 11/14 to be addressed for 7 days with expiration date, 11/21:  Patient will increase functional independence with ADLs by performing:    Patient will demonstrate rolling to the right with min assist.  Not met   Patient will demonstrate rolling to the left with modified independence.   Not met  Patient will demonstrate supine -sit with SBA.   Not met  Patient will demonstrate stand pivot transfers with min assist.   Not met  Patient will demonstrate grooming while standing with min assist.   Not met  Patient will demonstrate upper body dressing with mod assist while seated EOB.   Not met  Patient will demonstrate lower body dressing with mod assist while seated EOB.   Not met  Patient will demonstrate toileting with mod assist.   Not met  Patient's family / caregiver will demonstrate independence and safety with assisting patient with self-care skills and functional mobility.     Not met  Patient and/or patient's family will verbalize understanding of stroke prevention guidelines, personal risk factors and stroke warning signs via teachback method.  Not met           Goals remain appropriate.  GABRIELLE Bradshaw  11/16/2018

## 2018-11-16 NOTE — PT/OT/SLP PROGRESS
"Occupational Therapy   Treatment/ Goals revised    Name: Tyree Pablo  MRN: [id number]  Admitting Diagnosis:  Embolic stroke involving right middle cerebral artery       Recommendations:     Discharge Recommendations: rehabilitation facility  Discharge Equipment Recommendations:  3-in-1 commode, bath bench  Barriers to discharge:  Inaccessible home environment, Decreased caregiver support    Subjective     Pain/Comfort:  · Pain Rating 1: 0/10  · Pain Rating Post-Intervention 1: 0/10    Objective:   Patient:  "My motive is to strive to do better than yesterday.  I think I can walk."  Communicated with: Nurse prior to session.  Patient found with all lines intact, call button in reach and bed alarm on and bed alarm, peripheral IV, telemetry, PICC line upon OT entry to room.    General Precautions: Standard, aspiration, fall, vision impaired   Orthopedic Precautions:N/A   Braces: N/A     Occupational Performance:    Bed Mobility:    · Patient completed Rolling/Turning to Left with  stand by assistance  · Patient completed Rolling/Turning to Right with minimum assistance  · Patient completed Scooting/Bridging with moderate assistance  · Patient completed Supine to Sit with maximal assistance from the left side  · Patient completed Sit to Supine with moderate assistance     Functional Mobility/Transfers:  · Moderate assist with scooting along EOB    Activities of Daily Living:  · Grooming: minimum assistance while seated EOB with left UE in weight bearing  · Upper Body Dressing: moderate assistance while seated EOB with left UE in weight bearing  · Lower Body Dressing: total assistance while seated EOB      [facility] 6 Click ADL: 12    Treatment & Education:  Patient education provided for stroke warning signs, prevention guidelines and personal risk factors.  Patient verbalizing understanding via teach back method.   Patient education provided on role of OT and need for rehab upon discharge.  Patient education provided on " hemiplegic dressing technique, left UE weight bearing / positioning, postural control, and transfers.   Continued education, patient/ family training recommended. Patient alert and oriented x 3; able to follow 4/4 one step commands.  Patient attentive and interactive throughout the session. Addressed increasing attention to the left.  Able to identify 10/20 items on visual scanning task; after cuing provided, able to identify 16/20.    PROM performed left UE one set x 10 rep in all planes of motion with stretches provided at end range; sustained stretch provided for external rotation.  Assistance and facilitation provided for upward rotation of the scapula during shoulder flexion and abduction.  Addressed left UE weight bearing while seated EOB and during transitional movement patterns.  SBA with postural control while seated EOB with left UE in weight bearing.  Oral motor exercises performed while seated EOB with left UE in weight bearing.  Positioning provided for midline orientation with bilateral UEs elevated and heels lifted off mattress.  Gentle cervical rotation provided.   Patient's functional status and disposition recommendation discussed with patient and nurse.  White board updated in patient's room.  OT asked if there were any other questions; patient/ family had no further questions.    Patient left supine with all lines intact, call button in reach and bed alarm on  Education:    Assessment:     Tyree Pablo is a 43 y.o. male with a medical diagnosis of Embolic stroke involving right middle cerebral artery.  He presents with the following performance deficits affecting function are weakness, impaired self care skills, impaired balance, decreased coordination, decreased safety awareness, impaired endurance, impaired functional mobilty, impaired sensation, gait instability, impaired cognition, decreased lower extremity function, decreased upper extremity function, visual deficits, impaired coordination,  abnormal tone, impaired fine motor, decreased ROM.     Rehab Prognosis:  Good; patient would benefit from acute skilled OT services to address these deficits and reach maximum level of function.       Plan:     Patient to be seen 5 x/week to address the above listed problems via self-care/home management, neuromuscular re-education, cognitive retraining, therapeutic activities, therapeutic exercises  · Plan of Care Expires: 12/08/18  · Plan of Care Reviewed with: patient, mother    This Plan of care has been discussed with the patient who was involved in its development and understands and is in agreement with the identified goals and treatment plan    GOALS:   Multidisciplinary Problems     Occupational Therapy Goals        Problem: Occupational Therapy Goal    Goal Priority Disciplines Outcome Interventions   Occupational Therapy Goal     OT, PT/OT Ongoing (interventions implemented as appropriate)    Description:  Goals set 11/16 to be addressed for 7 days with expiration date, 11/23:  Patient will increase functional independence with ADLs by performing:    Patient will demonstrate rolling to the right with min assist.  Not met   Patient will demonstrate rolling to the left with modified independence.   Not met  Patient will demonstrate supine -sit with SBA.   Not met  Patient will demonstrate stand pivot transfers with min assist.   Not met  Patient will demonstrate grooming while standing with min assist.   Not met  Patient will demonstrate upper body dressing with min assist while seated EOB.   Not met  Patient will demonstrate lower body dressing with mod assist while seated EOB.   Not met  Patient will demonstrate toileting with mod assist.   Not met  Patient's family / caregiver will demonstrate independence and safety with assisting patient with self-care skills and functional mobility.     Not met  Patient and/or patient's family will verbalize understanding of stroke prevention guidelines, personal risk  factors and stroke warning signs via teachback method.  Not met                             Time Tracking:     OT Date of Treatment: 11/16/18  OT Start Time: 0719  OT Stop Time: 0807  OT Total Time (min): 48 min    Billable Minutes:Self Care/Home Management 26  Neuromuscular Re-education 22    GABRIELLE Bradshaw  11/16/2018

## 2018-11-16 NOTE — PROGRESS NOTES
[facility]  Vascular Neurology  Comprehensive Stroke Center  Progress Note    Assessment/Plan:     * Embolic stroke involving right middle cerebral artery    43 y.o. male with significant past medical history of obesity, HTN, tobacco abuse who was transferred from [facility] after receiving IV-tPA for acute left sided weakness.  Found to have R M1 occlusion of the right middle cerebral artery and taken to interventional radiology for thrombectomy.  Patient had successful thromboaspiration with TICI 3 reperfusion       MRI brain showed large right middle cerebral artery territory infarct.  Mild petechial hemorrhage.  Utox for multiple drugs of abuse including THC with amphetamine.  Amphetamines can cause abnormal heart rhythms and could be the etiology of stroke.  Patient does have other risk factors such as family history of early stroke, obesity, tobacco abuse, hypertension.  Echo with bubble 11/12: negative for intracardiac shunt. CT 11/13 Showing 0.2cm MLS shift and mass effect.        NAEON 11/16/18. Patient neurologically stable on exam with continued left sided weakness. -191 and HR  in last 24 hour. Discussed with Dr. Dunbar, continue amlodipine 10mg and increase hydralazine to 150mg TID. Plan to increase Lipitor to 80mg daily per Dr. Kapoor since LDL 14.  hypercoagulable panel overall negative, Lipoprotein A elevated at 134. Pending Rehab placement.       Antithrombotics: asa 81mg qd     Statins for secondary stroke prevention and hyperlipidemia, if present:   Statins: Atorvastatin- 80 mg daily     Aggressive risk factor modification: HTN, Smoking, Obesity, Drug use     Rehab efforts: PT/OT/SLP to evaluate and treat inpatient rehab     Diagnostics ordered/pending: none    VTE prophylaxis: SCDs. Heparin 5000 units q 8hours     BP parameters: Infarct: Post tPA, SBP <160         Angioedema due to angiotensin converting enzyme inhibitor (ACE-I)     Angioedema on lips and tongue   Improved with benadryl and steroids        Mixed hyperlipidemia    Risk factor for stroke     Increase Atorvastatin to 80mg qhs     Dysarthria    Due to stroke   SLP ordered      Abuse, drug or alcohol    Positive Utox screen for THC, amphetamine, opioids, benzodiazepines   Etiology of stroke        Tobacco abuse    -Stroke risk factor.  Encourage cessation.  -Nicotine patch prn.     Essential hypertension    -Stroke risk factor.  SBP<160  - -191 and HR  in last 24 hour. Discussed with Dr. Dunbar, continue amlodipine 10mg and increase hydralazine to 150mg TID.      S/P admn tPA in diff fac w/n last 24 hr bef adm to crnt fac    -Admitted to [facility] for close monitoring.  -Now stepped down to Vascular neurology service     Cytotoxic cerebral edema    -Area of cytotoxic cerebral edema identified when reviewing brain imaging in the territory of the right middle cerebral artery. There is no mass effect associated with it. We will continue to monitor the patients clinical exam for any worsening of symptoms which may indicate expansion of the stroke or the area of the edema resulting in the clinical change. The pattern is suggestive of embolic etiology.             Acute left-sided weakness    Due stroke  PT, OT ordered; recommending rehab placement  Continue aggressive daily therapy            11/12/18 Investigating etiologies of stroke.  Hypercoagulable and echo with bubble ordered. Patient utox + for amphetamines, THC, benzo, opiates.     11/13/18 NAEON patient neurologically stable.  Repeat CT head showing 0.2 cm midline shift and mass effect.  NCC will continue to monitor cerebral edema.  11/14/18 Patient experiencing angioedema from lisinopril.  Will stay in in the ICU for intubation watch.  IV benadryl and solumedrol ordered   11/15/18 angioedema improved with steroids and benadryl    STROKE DOCUMENTATION   Acute Stroke Times   Last Known Normal Date: 11/08/18  Last  Known Normal Time: 1930  Symptom Onset Date: 11/08/18  Symptom Onset Time: 2015  Stroke Team Called Date: 11/08/18  Stroke Team Called Time: 2212  Stroke Team Arrival Date: 11/08/18  Stroke Team Arrival Time: 2213  CT Interpretation Time: 2213(no acute findings)  Decision to Treat Time for Alteplase: 2228(tPA administered)  Decision to Treat Time for IR: 0347(no intervention at this time)    NIH Scale:  1a. Level Of Consciousness: 0-->Alert: keenly responsive  1b. LOC Questions: 0-->Answers both questions correctly  1c. LOC Commands: 0-->Performs both tasks correctly  2. Best Gaze: 0-->Normal  3. Visual: 2-->Complete hemianopia  4. Facial Palsy: 2-->Partial paralysis (total or near-total paralysis of lower face)  5a. Motor Arm, Left: 4-->No movement  5b. Motor Arm, Right: 0-->No drift: limb holds 90 (or 45) degrees for full 10 secs  6a. Motor Leg, Left: 4-->No movement  6b. Motor Leg, Right: 0-->No drift: leg holds 30 degree position for full 5 secs  7. Limb Ataxia: 0-->Absent  8. Sensory: 0-->Normal: no sensory loss  9. Best Language: 0-->No aphasia: normal  10. Dysarthria: 1-->Mild-to-moderate dysarthria: patient slurs at least some words and, at worst, can be understood with some difficulty  11. Extinction and Inattention (formerly Neglect): 0-->No abnormality  Total (NIH Stroke Scale): 13       Modified Montour Score: 0  Genny Coma Scale:15   ABCD2 Score:    [id number] Score:   HAS -BLED Score:   ICH Score:   [employer] Classification:      Hemorrhagic change of an Ischemic Stroke: Does this patient have an ischemic stroke with hemorrhagic changes? No     Neurologic Chief Complaint: left sided weakness    Subjective:     Interval History: Patient is seen for follow-up neurological assessment and treatment recommendations:     MAKENZIE. Patient working with speech therapy at bedside. He continues to have left sided weakness. Tolerating diet and voiding appropriately. Pending rehab placement. Mom at bedside.      HPI, Past Medical, Family, and Social History remains the same as documented in the initial encounter.     Review of Systems   Negative for lethargy, seizure, or fevers.  Positive for weakness, speech difficulty, and vision deficit.     Scheduled Meds:   albuterol-ipratropium  3 mL Nebulization Q6H WAKE    amLODIPine  10 mg Oral Daily    aspirin  81 mg Oral Daily    atorvastatin  40 mg Oral Daily    heparin (porcine)  5,000 Units Subcutaneous Q8H    hydrALAZINE  100 mg Oral Q8H     Continuous Infusions:  PRN Meds:dextrose 50%, glucagon (human recombinant), hydrALAZINE, insulin aspart U-100, labetalol, sodium chloride 0.9%    Objective:     Vital Signs (Most Recent):  Temp: 96.3 °F (35.7 °C) (11/16/18 1209)  Pulse: 79 (11/16/18 1323)  Resp: 18 (11/16/18 1323)  BP: 133/81 (11/16/18 1209)  SpO2: 99 % (11/16/18 1323)  BP Location: Right arm    Vital Signs Range (Last 24H):  Temp:  [96.3 °F (35.7 °C)-98.5 °F (36.9 °C)]   Pulse:  []   Resp:  [18-20]   BP: (133-191)/(74-92)   SpO2:  [95 %-99 %]   BP Location: Right arm    Physical Exam  Vital signs: reviewed above.   Constitutional: well-developed, no acute distress  Cardiovascular: regular rate and rhythm  Resp: normal respirations, not labored, no accessory muscles used  Abdomen: soft, non-distended, not tender to palpation  Pulses: palpable distal pulses   Skin: warm, dry and intact, no rashes  Angioedema of lips have significantly improved.   Neurological  GCS: Motor: 6/Verbal: 5/Eyes: 4 GCS Total: 15  Mental Status: Awake, Alert, Oriented x 4  Follows commands   Mild dysarthria, no aphasia  Good attention span  Head: normocephalic, atraumatic       Visual Fields:  complete hemianopsia on left       EOM (CN III, IV, VI): right gaze preference       Pupils (CN II, III): PERRL        Facial Sensation (CN V): Normal       Facial Movement (CN VII): left lower facial weakness  Moves all right extremities with good strength with no drift on all right  extremities  Left side 0/5  Intact to light touch throughout  Normal tone throughout except LUE flaccid.         Laboratory:  CMP:   Recent Labs   Lab 11/16/18  0648   CALCIUM 9.7      K 4.0   CO2 27      BUN 37*   CREATININE 0.9     CBC:   Recent Labs   Lab 11/16/18  0648   WBC 15.31*   RBC 5.15   HGB 13.1*   HCT 40.5   *   MCV 79*   MCH 25.4*   MCHC 32.3     Lipid Panel: No results for input(s): CHOL, LDLCALC, HDL, TRIG in the last 168 hours.  Coagulation: No results for input(s): PT, INR, APTT in the last 168 hours.  Platelet Aggregation Study: No results for input(s): PLTAGG, PLTAGINTERP, PLTAGREGLACO, ADPPLTAGGREG in the last 168 hours.  Hgb A1C: No results for input(s): HGBA1C in the last 168 hours.  TSH: No results for input(s): TSH in the last 168 hours.           Diagnostic Results     Brain Imaging   CT head 11/13/18  Evolving right MCA infarct with 0.2cm MLS  Mass effect upon the right lateral ventricle   Petechial hemorrhage      MRI brain 11/09/18    Large right MCA infarct   Cytotoxic cerebral edema  Petechial hemorrhage inside lesion      Vessel Imaging   Cerebral angiogram 11/09/18  Thromboaspiration of the m1 segment of right middle cerebral artery with TICI 3 reperfusion.       CTA MP 11/09/18  Abrupt distal m1 occlusion.     Cardiac Imaging   Echo 11/10/18  · Left ventricle ejection fraction is normal at 65%  · Normal LV diastolic function.  · RV systolic function is normal.  · Trace tricuspid regurgitation.  · Normal central venous pressure (3 mm Hg).  · No pericardial effusion.                      Stan Lebron PA-C  Comprehensive Stroke Center  Department of Vascular Neurology   [facility]

## 2018-11-16 NOTE — ASSESSMENT & PLAN NOTE
-Stroke risk factor.  SBP<160  - -191 and HR  in last 24 hour. Discussed with Dr. Dunbar, continue amlodipine 10mg and increase hydralazine to 150mg TID.

## 2018-11-16 NOTE — PT/OT/SLP PROGRESS
"     Physical Therapy Treatment    Patient Name: Tyree Pablo  MRN: [id number]   Diagnosis: Embolic stroke involving right middle cerebral artery    Recommendations:     Discharge Recommendations:  rehabilitation facility   Discharge Equipment Recommendations: (TBD)   Barriers to Discharge: decreased caregiver assistance, inaccessible home environment, fall risk    Plan:   During this hospitalization, the patient will be seen 5 x/week for gait training, therapeutic activities, therapeutic exercises, neuromuscular re-education to address impairments and functional mobility deficits.   · Plan of Care Expires: 12/10/18   Plan of Care Reviewed with: patient, mother    This plan of care has been discussed with the patient and/or family who were involved in its development and are in agreement with the identified goals and treatment plan.     Subjective   PT communicated with RN prior to therapy.     Patient comments/goals: "What is your opinion? How am I doing?"  Pain/Comfort:  · Pain Rating 1: 0/10  · Pain Rating Post-Intervention 1: 0/10    Recent Vital Signs: (Last documentation)  Temp: 96.3 °F (35.7 °C) (11/16/18 1209)  Pulse: 98 (11/16/18 1209)  Resp: 18 (11/16/18 1209)  BP: 133/81 (11/16/18 1209)  SpO2: 97 % (11/16/18 1209)     Objective:   General Precautions: aspiration, fall, vision impaired  Recent Surgery: * No surgery found *    The patient currently has bed alarm, peripheral IV, telemetry.    The patient was found supine in bed in NAD with mother at bedside.  He agreed to therapy.  Pt noticed to have more spontaneous visual tracking to the L side.  His mother was sitting on the L side to encourage L visual scanning when PT arrived. Moderate assistance to roll L d/t inattention to the L side.  Supine to sit from L sidelying, moderate assistance after PT placed RUE in weight bearing on the bed.  Initial sitting balance min assistance which improved to CGA once LUE was placed in weight bearing.  Reaching 6x CGA " with no LOB and good recovery of midline.  No RUE or RLE pushing observed in sitting.  Sit to stand 5x from EOB, max assistance with poor balance, poor safety, and poor alignment in standing.  RUE and RLE contraversive pushing emerge during transition movement and in standing.  Rehab tech assisted with safety.  A hemiwalker (low height) was used on final 2 stands to encourage weight shift R and to inhibit L sided LOB.  PT provided max assistance to block L knee, max assistance for lift, max assistance to prevent L LOB, and skilled verbal cues to address poor standing alignment.  Posture in standing: fwd hip flexion with hips L and shoulders R lean and R rotation, pt on R toes d/t pushing.  The final stand was better and pt remained standing x 30 seconds.     Therapeutic Activities, Education, or Exercises:  PT answered questions to patient/familys satisfaction within scope of practice.  Patient and family are aware of patient's deficits and therapy progression. White board updated to reflect current level of assistance.    The patient was left supine in bed with all lines intact, call bell in reach, bed alarm armed.     FUNCTIONAL OUTCOME MEASURES:  Turning over in bed (including adjusting bedclothes, sheets and blankets)?: 2  Sitting down on and standing up from a chair with arms (e.g., wheelchair, bedside commode, etc.): 2  Moving from lying on back to sitting on the side of the bed?: 2  Moving to and from a bed to a chair (including a wheelchair)?: 1  Need to walk in hospital room?: 1  Climbing 3-5 steps with a railing?: 1  Basic Mobility Total Score: 9    Goals:     Multidisciplinary Problems     Physical Therapy Goals        Problem: Physical Therapy Goal    Goal Priority Disciplines Outcome Goal Variances Interventions   Physical Therapy Goal     PT, PT/OT Ongoing (interventions implemented as appropriate)     Description:  Goals to be met by: 11/20/2018     Patient will increase functional independence with  mobility by performin. Supine to sit with MInimal Assistance  2. Sit to supine with MInimal Assistance  3. Sit to stand transfer with Minimal Assistance  4. Bed to chair transfer with Minimal Assistance  5. Gait  x 50 feet with Moderate Assistance with or without appropriate AD.   6. Sitting at edge of bed x10 minutes with Contact Guard Assistance  7. Lower extremity exercise program x15 reps per handout, with assistance as needed                    Assessment:   Tyree Pablo is a 43 y.o. male admitted with a medical diagnosis of Embolic stroke involving right middle cerebral artery.  He is highly motivated to participate in therapy. Mr. Pabol has poor perception of his posture and alignment when standing and would benefit from mirror feedback.  He has decreased R ankle DF from a prior orthopedic injury which is complicating his sit to stand transfer. He is unable to achieve foot flat during transition from sit to stand, but can place foot flat in static standing.  He is progressing toward his goals with each therapy session.  He is a good candidate for inpatient rehab.       Problems/Impairments:  weakness, decreased upper extremity function, impaired endurance, impaired balance, decreased lower extremity function, decreased safety awareness, visual deficits, impaired sensation, impaired fine motor, impaired self care skills, impaired cognition, impaired functional mobilty, abnormal tone     Rehab Prognosis:  good. The patient would benefit from acute skilled PT services to address these deficits and maximize their functional independence.     Time Tracking:     PT Received On:  18  PT Start Time:   1105    PT Stop Time:  1128  PT Total Time (min): 23 min     Billable Minutes: Therapeutic Activity 23     Val Guerra, PT  2018  [phone] (pager)

## 2018-11-17 PROBLEM — D72.829 LEUKOCYTOSIS: Status: RESOLVED | Noted: 2018-11-17 | Resolved: 2018-11-17

## 2018-11-17 PROBLEM — D72.829 LEUKOCYTOSIS: Status: ACTIVE | Noted: 2018-11-17

## 2018-11-17 PROBLEM — M79.89 LEFT ARM SWELLING: Status: ACTIVE | Noted: 2018-11-17

## 2018-11-17 LAB
ANION GAP SERPL CALC-SCNC: 8 MMOL/L
BASOPHILS # BLD AUTO: 0.02 K/UL
BASOPHILS NFR BLD: 0.3 %
BUN SERPL-MCNC: 34 MG/DL
CALCIUM SERPL-MCNC: 9.4 MG/DL
CHLORIDE SERPL-SCNC: 103 MMOL/L
CO2 SERPL-SCNC: 27 MMOL/L
CREAT SERPL-MCNC: 0.9 MG/DL
DIFFERENTIAL METHOD: ABNORMAL
EOSINOPHIL # BLD AUTO: 0 K/UL
EOSINOPHIL NFR BLD: 0 %
ERYTHROCYTE [DISTWIDTH] IN BLOOD BY AUTOMATED COUNT: 16.7 %
EST. GFR  (AFRICAN AMERICAN): >60 ML/MIN/1.73 M^2
EST. GFR  (NON AFRICAN AMERICAN): >60 ML/MIN/1.73 M^2
GLUCOSE SERPL-MCNC: 111 MG/DL
HCT VFR BLD AUTO: 40.8 %
HGB BLD-MCNC: 12.7 G/DL
IMM GRANULOCYTES # BLD AUTO: 0.03 K/UL
IMM GRANULOCYTES NFR BLD AUTO: 0.4 %
LYMPHOCYTES # BLD AUTO: 2.3 K/UL
LYMPHOCYTES NFR BLD: 32.2 %
MAGNESIUM SERPL-MCNC: 2.8 MG/DL
MCH RBC QN AUTO: 25 PG
MCHC RBC AUTO-ENTMCNC: 31.1 G/DL
MCV RBC AUTO: 81 FL
MONOCYTES # BLD AUTO: 0.5 K/UL
MONOCYTES NFR BLD: 7.5 %
NEUTROPHILS # BLD AUTO: 4.2 K/UL
NEUTROPHILS NFR BLD: 59.6 %
NRBC BLD-RTO: 0 /100 WBC
PHOSPHATE SERPL-MCNC: 4.2 MG/DL
PLATELET # BLD AUTO: 325 K/UL
PMV BLD AUTO: 12.2 FL
POCT GLUCOSE: 107 MG/DL (ref 70–110)
POCT GLUCOSE: 116 MG/DL (ref 70–110)
POCT GLUCOSE: 128 MG/DL (ref 70–110)
POTASSIUM SERPL-SCNC: 4.4 MMOL/L
RBC # BLD AUTO: 5.07 M/UL
SODIUM SERPL-SCNC: 138 MMOL/L
WBC # BLD AUTO: 7.06 K/UL

## 2018-11-17 PROCEDURE — 85025 COMPLETE CBC W/AUTO DIFF WBC: CPT

## 2018-11-17 PROCEDURE — 94640 AIRWAY INHALATION TREATMENT: CPT

## 2018-11-17 PROCEDURE — 25000003 PHARM REV CODE 250: Performed by: STUDENT IN AN ORGANIZED HEALTH CARE EDUCATION/TRAINING PROGRAM

## 2018-11-17 PROCEDURE — 99233 SBSQ HOSP IP/OBS HIGH 50: CPT | Mod: ,,, | Performed by: PSYCHIATRY & NEUROLOGY

## 2018-11-17 PROCEDURE — G8987 SELF CARE CURRENT STATUS: HCPCS | Mod: CL

## 2018-11-17 PROCEDURE — 36415 COLL VENOUS BLD VENIPUNCTURE: CPT

## 2018-11-17 PROCEDURE — 25000003 PHARM REV CODE 250: Performed by: PSYCHIATRY & NEUROLOGY

## 2018-11-17 PROCEDURE — 94761 N-INVAS EAR/PLS OXIMETRY MLT: CPT

## 2018-11-17 PROCEDURE — G8988 SELF CARE GOAL STATUS: HCPCS | Mod: CK

## 2018-11-17 PROCEDURE — 63600175 PHARM REV CODE 636 W HCPCS: Performed by: PSYCHIATRY & NEUROLOGY

## 2018-11-17 PROCEDURE — 25000242 PHARM REV CODE 250 ALT 637 W/ HCPCS: Performed by: STUDENT IN AN ORGANIZED HEALTH CARE EDUCATION/TRAINING PROGRAM

## 2018-11-17 PROCEDURE — 80048 BASIC METABOLIC PNL TOTAL CA: CPT

## 2018-11-17 PROCEDURE — 20600001 HC STEP DOWN PRIVATE ROOM

## 2018-11-17 PROCEDURE — 97110 THERAPEUTIC EXERCISES: CPT

## 2018-11-17 PROCEDURE — 25000003 PHARM REV CODE 250: Performed by: PHYSICIAN ASSISTANT

## 2018-11-17 PROCEDURE — 83735 ASSAY OF MAGNESIUM: CPT

## 2018-11-17 PROCEDURE — 84100 ASSAY OF PHOSPHORUS: CPT

## 2018-11-17 RX ORDER — FLUOXETINE 10 MG/1
10 CAPSULE ORAL DAILY
Status: DISCONTINUED | OUTPATIENT
Start: 2018-11-17 | End: 2018-11-19

## 2018-11-17 RX ADMIN — IPRATROPIUM BROMIDE AND ALBUTEROL SULFATE 3 ML: .5; 3 SOLUTION RESPIRATORY (INHALATION) at 03:11

## 2018-11-17 RX ADMIN — HEPARIN SODIUM 5000 UNITS: 5000 INJECTION, SOLUTION INTRAVENOUS; SUBCUTANEOUS at 08:11

## 2018-11-17 RX ADMIN — FLUOXETINE 10 MG: 10 CAPSULE ORAL at 02:11

## 2018-11-17 RX ADMIN — AMLODIPINE BESYLATE 10 MG: 10 TABLET ORAL at 08:11

## 2018-11-17 RX ADMIN — IPRATROPIUM BROMIDE AND ALBUTEROL SULFATE 3 ML: .5; 3 SOLUTION RESPIRATORY (INHALATION) at 10:11

## 2018-11-17 RX ADMIN — HEPARIN SODIUM 5000 UNITS: 5000 INJECTION, SOLUTION INTRAVENOUS; SUBCUTANEOUS at 02:11

## 2018-11-17 RX ADMIN — IPRATROPIUM BROMIDE AND ALBUTEROL SULFATE 3 ML: .5; 3 SOLUTION RESPIRATORY (INHALATION) at 09:11

## 2018-11-17 RX ADMIN — ATORVASTATIN CALCIUM 80 MG: 20 TABLET, FILM COATED ORAL at 08:11

## 2018-11-17 RX ADMIN — HYDRALAZINE HYDROCHLORIDE 125 MG: 50 TABLET ORAL at 02:11

## 2018-11-17 RX ADMIN — HEPARIN SODIUM 5000 UNITS: 5000 INJECTION, SOLUTION INTRAVENOUS; SUBCUTANEOUS at 06:11

## 2018-11-17 RX ADMIN — HYDRALAZINE HYDROCHLORIDE 125 MG: 50 TABLET ORAL at 08:11

## 2018-11-17 RX ADMIN — HYDRALAZINE HYDROCHLORIDE 125 MG: 50 TABLET ORAL at 06:11

## 2018-11-17 RX ADMIN — ASPIRIN 81 MG CHEWABLE TABLET 81 MG: 81 TABLET CHEWABLE at 08:11

## 2018-11-17 NOTE — ASSESSMENT & PLAN NOTE
43 y.o. male with significant past medical history of obesity, HTN, tobacco abuse who was transferred from [facility] after receiving IV-tPA for acute left sided weakness.  Found to have R M1 occlusion of the right middle cerebral artery and taken to interventional radiology for thrombectomy.  Patient had successful thromboaspiration with TICI 3 reperfusion       MRI brain showed large right middle cerebral artery territory infarct.  Mild petechial hemorrhage.  Utox for multiple drugs of abuse including THC with amphetamine.  Amphetamines can cause abnormal heart rhythms and could be the etiology of stroke.  Patient does have other risk factors such as family history of early stroke, obesity, tobacco abuse, hypertension.  Echo with bubble 11/12: negative for intracardiac shunt. CT 11/13 Showing 0.2cm MLS shift and mass effect.      Patient w/ elevation in WBC and hot, swollen LUE --> US ordered to evaluate. Otherwise afebrile w/ VSS. Starting Fluoxetine 10 mg QD today for mood stabilization as well as motor function improvement as per FLAME trial prior to patient being discharged to rehab.   Awaiting placement; likely d/c early next week.       Antithrombotics: asa 81mg qd     Statins for secondary stroke prevention and hyperlipidemia, if present:   Statins: Atorvastatin- 80 mg daily     Aggressive risk factor modification: HTN, Smoking, Obesity, Drug use     Rehab efforts: PT/OT/SLP to evaluate and treat inpatient rehab     Diagnostics ordered/pending: none    VTE prophylaxis: SCDs. Heparin 5000 units q 8hours     BP parameters: Infarct: Post tPA, SBP <160

## 2018-11-17 NOTE — PLAN OF CARE
Problem: Patient Care Overview  Goal: Plan of Care Review  Outcome: Ongoing (interventions implemented as appropriate)  Patient AAOx4.  NAD. VSS.   Bed alarm set and wheelchair seat belt on when out of bed.  Instructed patient to use call light for assistance and before getting up. Patient able to understand, repeat, and comply with safety interventions (such as nonskid socks, low bed, call light in reach, etc).  Patient taught signs and symptoms of orthostatic hypotension and fall preventive measures.   Personal items within reach on personal table.  Will continue to monitor patient.

## 2018-11-17 NOTE — ASSESSMENT & PLAN NOTE
- Noted swelling and increased temperature of overlying skin  - WBC elevated this AM  - US UE venous ordered to evaluate   - Will continue to follow

## 2018-11-17 NOTE — PROGRESS NOTES
[facility]  Vascular Neurology  Comprehensive Stroke Center  Progress Note    Assessment/Plan:     * Embolic stroke involving right middle cerebral artery    43 y.o. male with significant past medical history of obesity, HTN, tobacco abuse who was transferred from [facility] after receiving IV-tPA for acute left sided weakness.  Found to have R M1 occlusion of the right middle cerebral artery and taken to interventional radiology for thrombectomy.  Patient had successful thromboaspiration with TICI 3 reperfusion       MRI brain showed large right middle cerebral artery territory infarct.  Mild petechial hemorrhage.  Utox for multiple drugs of abuse including THC with amphetamine.  Amphetamines can cause abnormal heart rhythms and could be the etiology of stroke.  Patient does have other risk factors such as family history of early stroke, obesity, tobacco abuse, hypertension.  Echo with bubble 11/12: negative for intracardiac shunt. CT 11/13 Showing 0.2cm MLS shift and mass effect.      Patient w/ elevation in WBC and hot, swollen LUE --> US ordered to evaluate. Otherwise afebrile w/ VSS. Starting Fluoxetine 10 mg QD today for mood stabilization as well as motor function improvement as per FLAME trial prior to patient being discharged to rehab.   Awaiting placement; likely d/c early next week.       Antithrombotics: asa 81mg qd     Statins for secondary stroke prevention and hyperlipidemia, if present:   Statins: Atorvastatin- 80 mg daily     Aggressive risk factor modification: HTN, Smoking, Obesity, Drug use     Rehab efforts: PT/OT/SLP to evaluate and treat inpatient rehab     Diagnostics ordered/pending: none    VTE prophylaxis: SCDs. Heparin 5000 units q 8hours     BP parameters: Infarct: Post tPA, SBP <160         Left arm swelling    - Noted swelling and increased temperature of overlying skin  - WBC elevated this AM  - US UE venous ordered to evaluate   - Will  continue to follow      Angioedema due to angiotensin converting enzyme inhibitor (ACE-I)    Angioedema on lips and tongue   Improved with benadryl and steroids        Mixed hyperlipidemia    Risk factor for stroke     Increase Atorvastatin to 80mg qhs     Dysarthria    Due to stroke   SLP ordered      Abuse, drug or alcohol    Positive Utox screen for THC, amphetamine, opioids, benzodiazepines   Etiology of stroke        Tobacco abuse    -Stroke risk factor.  Encourage cessation.  -Nicotine patch prn.     Essential hypertension    -Stroke risk factor.  SBP<160  - -191 and HR  in last 24 hour. Discussed with Dr. Dunbar, continue amlodipine 10mg and increase hydralazine to 150mg TID.      S/P admn tPA in diff fac w/n last 24 hr bef adm to crnt fac    -Admitted to [facility] for close monitoring.  -Now stepped down to Vascular neurology service     Cytotoxic cerebral edema    -Area of cytotoxic cerebral edema identified when reviewing brain imaging in the territory of the right middle cerebral artery. There is no mass effect associated with it. We will continue to monitor the patients clinical exam for any worsening of symptoms which may indicate expansion of the stroke or the area of the edema resulting in the clinical change. The pattern is suggestive of embolic etiology.             Acute left-sided weakness    Due stroke  PT, OT ordered; recommending rehab placement  Continue aggressive daily therapy            11/12/18 Investigating etiologies of stroke.  Hypercoagulable and echo with bubble ordered. Patient utox + for amphetamines, THC, benzo, opiates.     11/13/18 NAEON patient neurologically stable.  Repeat CT head showing 0.2 cm midline shift and mass effect.  NCC will continue to monitor cerebral edema.  11/14/18 Patient experiencing angioedema from lisinopril.  Will stay in in the ICU for intubation watch.  IV benadryl and solumedrol ordered   11/15/18 angioedema improved with steroids and  benadryl  11/16/18. Patient neurologically stable on exam with continued left sided weakness. -191 and HR  in last 24 hour. Discussed with Dr. Dunbar, continue amlodipine 10mg and increase hydralazine to 150mg TID. Plan to increase Lipitor to 80mg daily per Dr. Kapoor since LDL 14.  hypercoagulable panel overall negative, Lipoprotein A elevated at 134. Pending Rehab placement.   11/17/2018 Patient w/ new leukocytosis noted and increased temp and swelling in the LUE; US ordered to evaluate the vasculature. No other complaints at this time. Awaiting placement to Rehab. Starting Fluoxetine 10 mg QD      STROKE DOCUMENTATION   Acute Stroke Times   Last Known Normal Date: 11/08/18  Last Known Normal Time: 1930  Symptom Onset Date: 11/08/18  Symptom Onset Time: 2015  Stroke Team Called Date: 11/08/18  Stroke Team Called Time: 2212  Stroke Team Arrival Date: 11/08/18  Stroke Team Arrival Time: 2213  CT Interpretation Time: 2213(no acute findings)  Decision to Treat Time for Alteplase: 2228(tPA administered)  Decision to Treat Time for IR: 0347(no intervention at this time)    NIH Scale:    1a. Level Of Consciousness: 0-->Alert: keenly responsive  1b. LOC Questions: 0-->Answers both questions correctly  1c. LOC Commands: 0-->Performs both tasks correctly  2. Best Gaze: 0-->Normal  3. Visual: 2-->Complete hemianopia  4. Facial Palsy: 2-->Partial paralysis (total or near-total paralysis of lower face)  5a. Motor Arm, Left: 4-->No movement  5b. Motor Arm, Right: 0-->No drift: limb holds 90 (or 45) degrees for full 10 secs  6a. Motor Leg, Left: 4-->No movement  6b. Motor Leg, Right: 0-->No drift: leg holds 30 degree position for full 5 secs  7. Limb Ataxia: 0-->Absent  8. Sensory: 0-->Normal: no sensory loss  9. Best Language: 0-->No aphasia: normal  10. Dysarthria: 1-->Mild-to-moderate dysarthria: patient slurs at least some words and, at worst, can be understood with some difficulty  11. Extinction and  "Inattention (formerly Neglect): 0-->No abnormality  Total (NIH Stroke Scale): 13          Modified Gladwin Score: 0  Genny Coma Scale:    ABCD2 Score:    [id number] Score:   HAS -BLED Score:   ICH Score:   [employer] Classification:      Hemorrhagic change of an Ischemic Stroke: Does this patient have an ischemic stroke with hemorrhagic changes? No     Neurologic Chief Complaint: right MCA infarct    Subjective:     Interval History: Patient is seen for follow-up neurological assessment and treatment recommendations:  Patient w/ new leukocytosis noted and increased temp and swelling in the LUE; US ordered to evaluate the vasculature. No other complaints at this time, aside from the LUE feeling "hot/warm". Awaiting placement to Rehab. Starting Fluoxetine 10 mg QD    HPI, Past Medical, Family, and Social History remains the same as documented in the initial encounter.     Review of Systems   Constitutional: Negative for chills and fever.   HENT: Positive for congestion. Negative for drooling.    Respiratory: Negative for cough and shortness of breath.    Gastrointestinal: Negative for diarrhea and vomiting.   Skin: Negative for pallor and rash.   Neurological: Positive for facial asymmetry and weakness.   Psychiatric/Behavioral: Negative for behavioral problems.     Scheduled Meds:   albuterol-ipratropium  3 mL Nebulization Q6H WAKE    amLODIPine  10 mg Oral Daily    aspirin  81 mg Oral Daily    atorvastatin  80 mg Oral Daily    FLUoxetine  10 mg Oral Daily    heparin (porcine)  5,000 Units Subcutaneous Q8H    hydrALAZINE  125 mg Oral Q8H     Continuous Infusions:  PRN Meds:dextrose 50%, glucagon (human recombinant), hydrALAZINE, insulin aspart U-100, labetalol, sodium chloride 0.9%    Objective:     Vital Signs (Most Recent):  Temp: 98.1 °F (36.7 °C) (11/17/18 1228)  Pulse: (!) 115 (11/17/18 1228)  Resp: 18 (11/17/18 1228)  BP: 133/73 (11/17/18 1228)  SpO2: (!) 94 % (11/17/18 1228)  BP Location: Left " arm    Vital Signs Range (Last 24H):  Temp:  [97.7 °F (36.5 °C)-98.5 °F (36.9 °C)]   Pulse:  []   Resp:  [16-20]   BP: (131-162)/(73-92)   SpO2:  [92 %-98 %]   BP Location: Left arm    Physical Exam   Constitutional: He appears well-developed and well-nourished.   HENT:   Head: Normocephalic and atraumatic.        Cardiovascular: Normal rate and regular rhythm.   Pulmonary/Chest: Effort normal. No respiratory distress.   Skin: Skin is warm and dry.   Vitals reviewed.      Neurological Exam:   LOC: alert  Attention Span: Good   Language: No aphasia  Articulation: mild dysarthria  Orientation: Person, Place, Time   Visual Fields: Hemianopsia left  EOM (CN III, IV, VI): R gaze preference  Pupils (CN II, III): PERRL  Motor: Arm left  Plegia 0/5  Leg left  Plegia 0/5  Arm right  Normal 5/5  Leg right Normal 5/5  Sensation: intact  Tone:flaccid in left upper extremity     Laboratory:  CMP:   Recent Labs   Lab 11/17/18  0610   CALCIUM 9.4      K 4.4   CO2 27      BUN 34*   CREATININE 0.9     CBC:   Recent Labs   Lab 11/17/18  0610   WBC 7.06   RBC 5.07   HGB 12.7*   HCT 40.8      MCV 81*   MCH 25.0*   MCHC 31.1*     Lipid Panel:   No results for input(s): CHOL, LDLCALC, HDL, TRIG in the last 168 hours.  Hgb A1C:   No results for input(s): HGBA1C in the last 168 hours.  TSH:   No results for input(s): TSH in the last 168 hours.    Diagnostic Results     Brain Imaging   CT head 11/13/18  Evolving right MCA infarct with 0.2cm MLS  Mass effect upon the right lateral ventricle   Petechial hemorrhage     MRI brain 11/09/18    Large right MCA infarct   Cytotoxic cerebral edema  Petechial hemorrhage inside lesion     Vessel Imaging   Cerebral angiogram 11/09/18  Thromboaspiration of the m1 segment of right middle cerebral artery with TICI 3 reperfusion.      CTA MP 11/09/18  Abrupt distal m1 occlusion.    Cardiac Imaging   Echo 11/10/18  · Left ventricle ejection fraction is normal at 65%  · Normal LV  diastolic function.  · RV systolic function is normal.  · Trace tricuspid regurgitation.  · Normal central venous pressure (3 mm Hg).  · No pericardial effusion.             Tran Blanc MD  Comprehensive Stroke Center  Department of Vascular Neurology   [facility]

## 2018-11-17 NOTE — PT/OT/SLP PROGRESS
Occupational Therapy   Treatment    Name: Tyree Pablo  MRN: [id number]  Admitting Diagnosis:  Embolic stroke involving right middle cerebral artery       Recommendations:     Discharge Recommendations: rehabilitation facility  Discharge Equipment Recommendations:  (TBD)  Barriers to discharge:  Inaccessible home environment, Decreased caregiver support    Subjective     Pain/Comfort:  · Pain Rating 1: 0/10  · Pain Addressed 1: Reposition, Distraction, Cessation of Activity  · Pain Rating Post-Intervention 1: 0/10    Objective:     Communicated with: RN prior to session.  Patient found with all lines intact, call button in reach, bed alarm on and mother present and bed alarm, telemetry upon OT entry to room.    General Precautions: Standard, aspiration, fall   Orthopedic Precautions:N/A   Braces: N/A     Occupational Performance:    Bed Mobility:    · Patient completed Rolling/Turning to Left with  moderate assistance and with side rail  · Patient completed Rolling/Turning to Right with minimum assistance and with side rail  · Patient completed Scooting/Bridging to HOB with stand by assistance and with side rail  · Patient completed Supine to Sit with maximal assistance  · Patient completed Sit to Supine with moderate assistance   · Pt seated EOB ~8 min with assist ranging from min to SBA; pt report fatigue in sitting and asked to return to supine    Functional Mobility/Transfers:  · Patient completed Sit <> Stand Transfer with maximal assistance  with  no assistive device   · Functional Mobility: NT    Activities of Daily Living:  · NT      [facility] 6 Click ADL: 12    Treatment & Education:  RUE AROM exercises 15 x 1 for shoulder flex, elbow flex/ext, and chest press while seated EOB  LUE PROM exercises 15 x 1 for shoulder flex to 90, elbow flex/ext, and chest press with support at 90 abd  LLE PROM exercises 15 x 1 for hip flex, knee flex/ext, and hip abd/add; no PROM at ankle for ankle pumps  Pt educated on role of  OT/POC  Pt educated on importance of EOB/OOB activity  Pt educated on importance of UE/LE ROM   White board/communication board updated    Patient left HOB elevated with all lines intact, call button in reach, RN notified and mother present  Education:    Assessment:     Tyree Pablo is a 43 y.o. male with a medical diagnosis of Embolic stroke involving right middle cerebral artery.  He presents with the following performance deficits affecting function are weakness, impaired endurance, impaired self care skills, impaired functional mobilty, gait instability, impaired balance, decreased lower extremity function, decreased upper extremity function, decreased ROM.     Rehab Prognosis:  Fair; patient would benefit from acute skilled OT services to address these deficits and reach maximum level of function.       Plan:     Patient to be seen 5 x/week to address the above listed problems via self-care/home management, therapeutic activities, therapeutic exercises  · Plan of Care Expires: 12/08/18  · Plan of Care Reviewed with: patient    This Plan of care has been discussed with the patient who was involved in its development and understands and is in agreement with the identified goals and treatment plan    GOALS:   Multidisciplinary Problems     Occupational Therapy Goals        Problem: Occupational Therapy Goal    Goal Priority Disciplines Outcome Interventions   Occupational Therapy Goal     OT, PT/OT Ongoing (interventions implemented as appropriate)    Description:  Goals set 11/16 to be addressed for 7 days with expiration date, 11/23:  Patient will increase functional independence with ADLs by performing:    Patient will demonstrate rolling to the right with min assist.  MET  Patient will demonstrate rolling to the left with modified independence.   Not met  Patient will demonstrate supine -sit with SBA.   Not met  Patient will demonstrate stand pivot transfers with min assist.   Not met  Patient will  demonstrate grooming while standing with min assist.   Not met  Patient will demonstrate upper body dressing with min assist while seated EOB.   Not met  Patient will demonstrate lower body dressing with mod assist while seated EOB.   Not met  Patient will demonstrate toileting with mod assist.   Not met  Patient's family / caregiver will demonstrate independence and safety with assisting patient with self-care skills and functional mobility.     Not met  Patient and/or patient's family will verbalize understanding of stroke prevention guidelines, personal risk factors and stroke warning signs via teachback method.  Not met                              Time Tracking:     OT Date of Treatment: 11/17/18  OT Start Time: 1047  OT Stop Time: 1102  OT Total Time (min): 15 min    Billable Minutes:Therapeutic Exercise 15    Sigrid Chen OT  11/17/2018

## 2018-11-17 NOTE — PLAN OF CARE
Problem: Occupational Therapy Goal  Goal: Occupational Therapy Goal  Goals set 11/16 to be addressed for 7 days with expiration date, 11/23:  Patient will increase functional independence with ADLs by performing:    Patient will demonstrate rolling to the right with min assist.  MET  Patient will demonstrate rolling to the left with modified independence.   Not met  Patient will demonstrate supine -sit with SBA.   Not met  Patient will demonstrate stand pivot transfers with min assist.   Not met  Patient will demonstrate grooming while standing with min assist.   Not met  Patient will demonstrate upper body dressing with min assist while seated EOB.   Not met  Patient will demonstrate lower body dressing with mod assist while seated EOB.   Not met  Patient will demonstrate toileting with mod assist.   Not met  Patient's family / caregiver will demonstrate independence and safety with assisting patient with self-care skills and functional mobility.     Not met  Patient and/or patient's family will verbalize understanding of stroke prevention guidelines, personal risk factors and stroke warning signs via teachback method.  Not met            Outcome: Ongoing (interventions implemented as appropriate)  Goal met for R roll; pt progressing toward remaining goals    Comments: Continue OT POC     Sigrid Chen OT  11/17/2018

## 2018-11-17 NOTE — SUBJECTIVE & OBJECTIVE
"Neurologic Chief Complaint: right MCA infarct    Subjective:     Interval History: Patient is seen for follow-up neurological assessment and treatment recommendations:  Patient w/ new leukocytosis noted and increased temp and swelling in the LUE; US ordered to evaluate the vasculature. No other complaints at this time, aside from the LUE feeling "hot/warm". Awaiting placement to Rehab. Starting Fluoxetine 10 mg QD    HPI, Past Medical, Family, and Social History remains the same as documented in the initial encounter.     Review of Systems   Constitutional: Negative for chills and fever.   HENT: Positive for congestion. Negative for drooling.    Respiratory: Negative for cough and shortness of breath.    Gastrointestinal: Negative for diarrhea and vomiting.   Skin: Negative for pallor and rash.   Neurological: Positive for facial asymmetry and weakness.   Psychiatric/Behavioral: Negative for behavioral problems.     Scheduled Meds:   albuterol-ipratropium  3 mL Nebulization Q6H WAKE    amLODIPine  10 mg Oral Daily    aspirin  81 mg Oral Daily    atorvastatin  80 mg Oral Daily    FLUoxetine  10 mg Oral Daily    heparin (porcine)  5,000 Units Subcutaneous Q8H    hydrALAZINE  125 mg Oral Q8H     Continuous Infusions:  PRN Meds:dextrose 50%, glucagon (human recombinant), hydrALAZINE, insulin aspart U-100, labetalol, sodium chloride 0.9%    Objective:     Vital Signs (Most Recent):  Temp: 98.1 °F (36.7 °C) (11/17/18 1228)  Pulse: (!) 115 (11/17/18 1228)  Resp: 18 (11/17/18 1228)  BP: 133/73 (11/17/18 1228)  SpO2: (!) 94 % (11/17/18 1228)  BP Location: Left arm    Vital Signs Range (Last 24H):  Temp:  [97.7 °F (36.5 °C)-98.5 °F (36.9 °C)]   Pulse:  []   Resp:  [16-20]   BP: (131-162)/(73-92)   SpO2:  [92 %-98 %]   BP Location: Left arm    Physical Exam   Constitutional: He appears well-developed and well-nourished.   HENT:   Head: Normocephalic and atraumatic.        Cardiovascular: Normal rate and regular " rhythm.   Pulmonary/Chest: Effort normal. No respiratory distress.   Skin: Skin is warm and dry.   Vitals reviewed.      Neurological Exam:   LOC: alert  Attention Span: Good   Language: No aphasia  Articulation: mild dysarthria  Orientation: Person, Place, Time   Visual Fields: Hemianopsia left  EOM (CN III, IV, VI): R gaze preference  Pupils (CN II, III): PERRL  Motor: Arm left  Plegia 0/5  Leg left  Plegia 0/5  Arm right  Normal 5/5  Leg right Normal 5/5  Sensation: intact  Tone:flaccid in left upper extremity     Laboratory:  CMP:   Recent Labs   Lab 11/17/18  0610   CALCIUM 9.4      K 4.4   CO2 27      BUN 34*   CREATININE 0.9     CBC:   Recent Labs   Lab 11/17/18  0610   WBC 7.06   RBC 5.07   HGB 12.7*   HCT 40.8      MCV 81*   MCH 25.0*   MCHC 31.1*     Lipid Panel:   No results for input(s): CHOL, LDLCALC, HDL, TRIG in the last 168 hours.  Hgb A1C:   No results for input(s): HGBA1C in the last 168 hours.  TSH:   No results for input(s): TSH in the last 168 hours.    Diagnostic Results     Brain Imaging   CT head 11/13/18  Evolving right MCA infarct with 0.2cm MLS  Mass effect upon the right lateral ventricle   Petechial hemorrhage     MRI brain 11/09/18    Large right MCA infarct   Cytotoxic cerebral edema  Petechial hemorrhage inside lesion     Vessel Imaging   Cerebral angiogram 11/09/18  Thromboaspiration of the m1 segment of right middle cerebral artery with TICI 3 reperfusion.      CTA MP 11/09/18  Abrupt distal m1 occlusion.    Cardiac Imaging   Echo 11/10/18  · Left ventricle ejection fraction is normal at 65%  · Normal LV diastolic function.  · RV systolic function is normal.  · Trace tricuspid regurgitation.  · Normal central venous pressure (3 mm Hg).  · No pericardial effusion.
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
## 2018-11-18 PROBLEM — I63.9 STROKE: Status: RESOLVED | Noted: 2018-11-15 | Resolved: 2018-11-18

## 2018-11-18 LAB
ANION GAP SERPL CALC-SCNC: 8 MMOL/L
BASOPHILS # BLD AUTO: 0.04 K/UL
BASOPHILS NFR BLD: 0.6 %
BUN SERPL-MCNC: 25 MG/DL
CALCIUM SERPL-MCNC: 9.7 MG/DL
CHLORIDE SERPL-SCNC: 102 MMOL/L
CO2 SERPL-SCNC: 27 MMOL/L
CREAT SERPL-MCNC: 0.9 MG/DL
DIFFERENTIAL METHOD: ABNORMAL
EOSINOPHIL # BLD AUTO: 0 K/UL
EOSINOPHIL NFR BLD: 0.3 %
ERYTHROCYTE [DISTWIDTH] IN BLOOD BY AUTOMATED COUNT: 16.2 %
EST. GFR  (AFRICAN AMERICAN): >60 ML/MIN/1.73 M^2
EST. GFR  (NON AFRICAN AMERICAN): >60 ML/MIN/1.73 M^2
GLUCOSE SERPL-MCNC: 115 MG/DL
HCT VFR BLD AUTO: 41.9 %
HGB BLD-MCNC: 13 G/DL
IMM GRANULOCYTES # BLD AUTO: 0.03 K/UL
IMM GRANULOCYTES NFR BLD AUTO: 0.4 %
LYMPHOCYTES # BLD AUTO: 2.2 K/UL
LYMPHOCYTES NFR BLD: 31.9 %
MAGNESIUM SERPL-MCNC: 2.6 MG/DL
MCH RBC QN AUTO: 25.4 PG
MCHC RBC AUTO-ENTMCNC: 31 G/DL
MCV RBC AUTO: 82 FL
MONOCYTES # BLD AUTO: 0.5 K/UL
MONOCYTES NFR BLD: 7.9 %
NEUTROPHILS # BLD AUTO: 4 K/UL
NEUTROPHILS NFR BLD: 58.9 %
NRBC BLD-RTO: 0 /100 WBC
PHOSPHATE SERPL-MCNC: 4.1 MG/DL
PLATELET # BLD AUTO: 336 K/UL
PMV BLD AUTO: 12.1 FL
POCT GLUCOSE: 122 MG/DL (ref 70–110)
POCT GLUCOSE: 126 MG/DL (ref 70–110)
POCT GLUCOSE: 146 MG/DL (ref 70–110)
POTASSIUM SERPL-SCNC: 4.3 MMOL/L
RBC # BLD AUTO: 5.12 M/UL
SODIUM SERPL-SCNC: 137 MMOL/L
WBC # BLD AUTO: 6.73 K/UL

## 2018-11-18 PROCEDURE — 20600001 HC STEP DOWN PRIVATE ROOM

## 2018-11-18 PROCEDURE — 25000003 PHARM REV CODE 250: Performed by: PHYSICIAN ASSISTANT

## 2018-11-18 PROCEDURE — 97530 THERAPEUTIC ACTIVITIES: CPT

## 2018-11-18 PROCEDURE — 94761 N-INVAS EAR/PLS OXIMETRY MLT: CPT

## 2018-11-18 PROCEDURE — 80048 BASIC METABOLIC PNL TOTAL CA: CPT

## 2018-11-18 PROCEDURE — 84100 ASSAY OF PHOSPHORUS: CPT

## 2018-11-18 PROCEDURE — 25000242 PHARM REV CODE 250 ALT 637 W/ HCPCS: Performed by: STUDENT IN AN ORGANIZED HEALTH CARE EDUCATION/TRAINING PROGRAM

## 2018-11-18 PROCEDURE — 94640 AIRWAY INHALATION TREATMENT: CPT

## 2018-11-18 PROCEDURE — 25000003 PHARM REV CODE 250: Performed by: PSYCHIATRY & NEUROLOGY

## 2018-11-18 PROCEDURE — 97112 NEUROMUSCULAR REEDUCATION: CPT

## 2018-11-18 PROCEDURE — 99233 SBSQ HOSP IP/OBS HIGH 50: CPT | Mod: ,,, | Performed by: PSYCHIATRY & NEUROLOGY

## 2018-11-18 PROCEDURE — 25000003 PHARM REV CODE 250: Performed by: STUDENT IN AN ORGANIZED HEALTH CARE EDUCATION/TRAINING PROGRAM

## 2018-11-18 PROCEDURE — 83735 ASSAY OF MAGNESIUM: CPT

## 2018-11-18 PROCEDURE — 63600175 PHARM REV CODE 636 W HCPCS: Performed by: PSYCHIATRY & NEUROLOGY

## 2018-11-18 PROCEDURE — 85025 COMPLETE CBC W/AUTO DIFF WBC: CPT

## 2018-11-18 RX ORDER — ACETAMINOPHEN 325 MG/1
650 TABLET ORAL EVERY 6 HOURS PRN
Status: DISCONTINUED | OUTPATIENT
Start: 2018-11-18 | End: 2018-11-22 | Stop reason: HOSPADM

## 2018-11-18 RX ADMIN — HEPARIN SODIUM 5000 UNITS: 5000 INJECTION, SOLUTION INTRAVENOUS; SUBCUTANEOUS at 05:11

## 2018-11-18 RX ADMIN — ATORVASTATIN CALCIUM 80 MG: 20 TABLET, FILM COATED ORAL at 09:11

## 2018-11-18 RX ADMIN — HYDRALAZINE HYDROCHLORIDE 125 MG: 50 TABLET ORAL at 10:11

## 2018-11-18 RX ADMIN — ACETAMINOPHEN 650 MG: 325 TABLET, FILM COATED ORAL at 02:11

## 2018-11-18 RX ADMIN — HEPARIN SODIUM 5000 UNITS: 5000 INJECTION, SOLUTION INTRAVENOUS; SUBCUTANEOUS at 02:11

## 2018-11-18 RX ADMIN — HYDRALAZINE HYDROCHLORIDE 125 MG: 50 TABLET ORAL at 05:11

## 2018-11-18 RX ADMIN — HEPARIN SODIUM 5000 UNITS: 5000 INJECTION, SOLUTION INTRAVENOUS; SUBCUTANEOUS at 10:11

## 2018-11-18 RX ADMIN — HYDRALAZINE HYDROCHLORIDE 125 MG: 50 TABLET ORAL at 02:11

## 2018-11-18 RX ADMIN — ACETAMINOPHEN 650 MG: 325 TABLET, FILM COATED ORAL at 10:11

## 2018-11-18 RX ADMIN — IPRATROPIUM BROMIDE AND ALBUTEROL SULFATE 3 ML: .5; 3 SOLUTION RESPIRATORY (INHALATION) at 02:11

## 2018-11-18 RX ADMIN — ASPIRIN 81 MG CHEWABLE TABLET 81 MG: 81 TABLET CHEWABLE at 09:11

## 2018-11-18 RX ADMIN — AMLODIPINE BESYLATE 10 MG: 10 TABLET ORAL at 09:11

## 2018-11-18 RX ADMIN — IPRATROPIUM BROMIDE AND ALBUTEROL SULFATE 3 ML: .5; 3 SOLUTION RESPIRATORY (INHALATION) at 07:11

## 2018-11-18 NOTE — PLAN OF CARE
Problem: Patient Care Overview  Goal: Plan of Care Review  Outcome: Ongoing (interventions implemented as appropriate)  Plan of care reviewed with patient. Verbalized understanding. No acute events overnight. Vitals stable. Able to reposition self in bed. Remains free from falls. Bed low, call light in reach. Will continue to monitor.

## 2018-11-18 NOTE — PLAN OF CARE
Problem: Physical Therapy Goal  Goal: Physical Therapy Goal  Goals to be met by: 2018     Patient will increase functional independence with mobility by performin. Supine to sit with MInimal Assistance  2. Sit to supine with MInimal Assistance  3. Sit to stand transfer with Minimal Assistance  4. Bed to chair transfer with Minimal Assistance  5. Gait  x 50 feet with Moderate Assistance with or without appropriate AD.   6. Sitting at edge of bed x10 minutes with Contact Guard Assistance  7. Lower extremity exercise program x15 reps per handout, with assistance as needed     Outcome: Ongoing (interventions implemented as appropriate)  Patient participated well in therapy.  POC and goals remain appropriate.  Please refer to the progress note for functional mobility.     Val Guerra, PT  2018  [phone] (pager)

## 2018-11-18 NOTE — PROGRESS NOTES
[facility]  Vascular Neurology  Comprehensive Stroke Center  Progress Note    Assessment/Plan:     * Embolic stroke involving right middle cerebral artery    43 y.o. male with significant past medical history of obesity, HTN, tobacco abuse who was transferred from [facility] after receiving IV-tPA for acute left sided weakness.  Found to have R M1 occlusion of the right middle cerebral artery and taken to interventional radiology for thrombectomy.  Patient had successful thromboaspiration with TICI 3 reperfusion       MRI brain showed large right middle cerebral artery territory infarct.  Mild petechial hemorrhage.  Utox for multiple drugs of abuse including THC with amphetamine.  Amphetamines can cause abnormal heart rhythms and could be the etiology of stroke.  Patient does have other risk factors such as family history of early stroke, obesity, tobacco abuse, hypertension.  Echo with bubble 11/12: negative for intracardiac shunt. CT 11/13 Showing 0.2cm MLS shift and mass effect.    Leukocytosis resolved and swelling in LUE improving. Tylenol PRN for shoulder pain w/ good relief.   Awaiting placement; likely d/c early next week.     Antithrombotics: asa 81mg qd     Statins for secondary stroke prevention and hyperlipidemia, if present:   Statins: Atorvastatin- 80 mg daily     Aggressive risk factor modification: HTN, Smoking, Obesity, Drug use     Rehab efforts: PT/OT/SLP to evaluate and treat inpatient rehab     Diagnostics ordered/pending: none    VTE prophylaxis: SCDs. Heparin 5000 units q 8hours     BP parameters: Infarct: Post tPA, SBP <160         Left arm swelling    - Noted swelling and increased temperature of overlying skin  - WBC elevated this AM; leukocytosis now resolved  - US UE venous ordered to evaluate; no DVT noted  - Swelling improving, no further complaints      Angioedema due to angiotensin converting enzyme inhibitor (ACE-I)    - Angioedema on lips  and tongue   - Improved with benadryl and steroids   - Recorded in patient's allergies        Mixed hyperlipidemia    - Risk factor for stroke  -    - Continue Atorvastatin 80mg QHS     Dysarthria    - Due to stroke   - SLP ordered and following      Abuse, drug or alcohol    - Positive Utox screen for THC, amphetamine, opioids, benzodiazepines   - Likely the etiology of stroke        Tobacco abuse    - Stroke risk factor.  Encourage cessation.  - Nicotine patch PRN     Essential hypertension    - Stroke risk factor.  SBP<160  - Continue Amlodipine 10mg and Hydralazine 125 mg TID  - BP well controlled since medication adjustments were made     S/P admn tPA in diff fac w/n last 24 hr bef adm to crnt fac    - Admitted to [facility] for close monitoring initially  - Now stepped down to Vascular neurology service     Cytotoxic cerebral edema    - Area of cytotoxic cerebral edema identified when reviewing brain imaging in the territory of the right middle cerebral artery. There is no mass effect associated with it. We will continue to monitor the patients clinical exam for any worsening of symptoms which may indicate expansion of the stroke or the area of the edema resulting in the clinical change. The pattern is suggestive of embolic etiology.             Acute left-sided weakness    - Sequelae of infarct  - PT, OT ordered; recommending rehab placement  - Continue aggressive daily therapy            11/12/18 Investigating etiologies of stroke.  Hypercoagulable and echo with bubble ordered. Patient utox + for amphetamines, THC, benzo, opiates.     11/13/18 NAEON patient neurologically stable.  Repeat CT head showing 0.2 cm midline shift and mass effect.  NCC will continue to monitor cerebral edema.  11/14/18 Patient experiencing angioedema from lisinopril.  Will stay in in the ICU for intubation watch.  IV benadryl and solumedrol ordered   11/15/18 angioedema improved with steroids and benadryl  11/16/18. Patient  neurologically stable on exam with continued left sided weakness. -191 and HR  in last 24 hour. Discussed with Dr. Dunbar, continue amlodipine 10mg and increase hydralazine to 150mg TID. Plan to increase Lipitor to 80mg daily per Dr. Kapoor since LDL 14.  hypercoagulable panel overall negative, Lipoprotein A elevated at 134. Pending Rehab placement.   11/17/2018 Patient w/ new leukocytosis noted and increased temp and swelling in the LUE; US ordered to evaluate the vasculature. No other complaints at this time. Awaiting placement to Rehab. Starting Fluoxetine 10 mg QD  11/18/2018 No DVT on US. Patient doing well. Shoulder pain relieved w/ Tylenol PRN      STROKE DOCUMENTATION   Acute Stroke Times   Last Known Normal Date: 11/08/18  Last Known Normal Time: 1930  Symptom Onset Date: 11/08/18  Symptom Onset Time: 2015  Stroke Team Called Date: 11/08/18  Stroke Team Called Time: 2212  Stroke Team Arrival Date: 11/08/18  Stroke Team Arrival Time: 2213  CT Interpretation Time: 2213(no acute findings)  Decision to Treat Time for Alteplase: 2228(tPA administered)  Decision to Treat Time for IR: 0347(no intervention at this time)    NIH Scale:  1a. Level Of Consciousness: 0-->Alert: keenly responsive  1b. LOC Questions: 0-->Answers both questions correctly  1c. LOC Commands: 0-->Performs both tasks correctly  2. Best Gaze: 0-->Normal  3. Visual: 2-->Complete hemianopia  4. Facial Palsy: 2-->Partial paralysis (total or near-total paralysis of lower face)  5a. Motor Arm, Left: 4-->No movement  5b. Motor Arm, Right: 0-->No drift: limb holds 90 (or 45) degrees for full 10 secs  6a. Motor Leg, Left: 4-->No movement  6b. Motor Leg, Right: 0-->No drift: leg holds 30 degree position for full 5 secs  7. Limb Ataxia: 0-->Absent  8. Sensory: 0-->Normal: no sensory loss  9. Best Language: 0-->No aphasia: normal  10. Dysarthria: 1-->Mild-to-moderate dysarthria: patient slurs at least some words and, at worst, can be  understood with some difficulty  11. Extinction and Inattention (formerly Neglect): 0-->No abnormality  Total (NIH Stroke Scale): 13      Modified Danilo Score: 4  [place] Coma Scale:    ABCD2 Score:    [id number] Score:   HAS -BLED Score:   ICH Score:   [employer] Classification:      Hemorrhagic change of an Ischemic Stroke: Does this patient have an ischemic stroke with hemorrhagic changes? No     Neurologic Chief Complaint: right MCA infarct    Subjective:     Interval History: Patient is seen for follow-up neurological assessment and treatment recommendations:  No DVT on US and leukocytosis resolved.  Patient doing well w/o any complaints and no events overnight.  Shoulder pain relieved w/ Tylenol PRN.  Awaiting placement at Rehab.    HPI, Past Medical, Family, and Social History remains the same as documented in the initial encounter.     Review of Systems   Constitutional: Negative for chills and fever.   HENT: Positive for congestion. Negative for drooling.    Respiratory: Negative for cough and shortness of breath.    Gastrointestinal: Negative for diarrhea and vomiting.   Skin: Negative for pallor and rash.   Neurological: Positive for facial asymmetry and weakness.   Psychiatric/Behavioral: Negative for behavioral problems.     Scheduled Meds:   albuterol-ipratropium  3 mL Nebulization Q6H WAKE    amLODIPine  10 mg Oral Daily    aspirin  81 mg Oral Daily    atorvastatin  80 mg Oral Daily    FLUoxetine  10 mg Oral Daily    heparin (porcine)  5,000 Units Subcutaneous Q8H    hydrALAZINE  125 mg Oral Q8H     Continuous Infusions:  PRN Meds:acetaminophen, dextrose 50%, glucagon (human recombinant), hydrALAZINE, insulin aspart U-100, labetalol, sodium chloride 0.9%    Objective:     Vital Signs (Most Recent):  Temp: 98.1 °F (36.7 °C) (11/18/18 0822)  Pulse: 92 (11/18/18 0822)  Resp: 17 (11/18/18 0822)  BP: (!) 147/82 (11/18/18 0822)  SpO2: 99 % (11/18/18 0822)  BP Location: Left arm    Vital Signs  Range (Last 24H):  Temp:  [98.1 °F (36.7 °C)-98.7 °F (37.1 °C)]   Pulse:  []   Resp:  [17-20]   BP: (145-147)/(76-91)   SpO2:  [93 %-99 %]   BP Location: Left arm    Physical Exam   Constitutional: He appears well-developed and well-nourished.   HENT:   Head: Normocephalic and atraumatic.        Cardiovascular: Normal rate and regular rhythm.   Pulmonary/Chest: Effort normal. No respiratory distress.   Skin: Skin is warm and dry.   Vitals reviewed.  Some noted swelling of the LUE, improving, and not as warm as the day prior     Neurological Exam:   LOC: alert  Attention Span: Good   Language: No aphasia  Articulation: mild dysarthria  Orientation: Person, Place, Time   Visual Fields: Hemianopsia left  EOM (CN III, IV, VI): R gaze preference  Pupils (CN II, III): PERRL  Motor: Arm left  Plegia 0/5  Leg left  Paresis: 1/5  Arm right  Normal 5/5  Leg right Normal 5/5  Sensation: intact  Tone:flaccid in left upper extremity     Laboratory:  CMP:   Recent Labs   Lab 11/18/18  0528   CALCIUM 9.7      K 4.3   CO2 27      BUN 25*   CREATININE 0.9     CBC:   Recent Labs   Lab 11/18/18  0528   WBC 6.73   RBC 5.12   HGB 13.0*   HCT 41.9      MCV 82   MCH 25.4*   MCHC 31.0*     Lipid Panel:   No results for input(s): CHOL, LDLCALC, HDL, TRIG in the last 168 hours.  Hgb A1C:   No results for input(s): HGBA1C in the last 168 hours.  TSH:   No results for input(s): TSH in the last 168 hours.    Diagnostic Results     Brain Imaging   CT head 11/13/18  Evolving right MCA infarct with 0.2cm MLS  Mass effect upon the right lateral ventricle   Petechial hemorrhage     MRI brain 11/09/18    Large right MCA infarct   Cytotoxic cerebral edema  Petechial hemorrhage inside lesion     Vessel Imaging   Cerebral angiogram 11/09/18  Thromboaspiration of the m1 segment of right middle cerebral artery with TICI 3 reperfusion.      CTA MP 11/09/18  Abrupt distal m1 occlusion.    Cardiac Imaging   Echo 11/10/18  · Left  ventricle ejection fraction is normal at 65%  · Normal LV diastolic function.  · RV systolic function is normal.  · Trace tricuspid regurgitation.  · Normal central venous pressure (3 mm Hg).  · No pericardial effusion.             Tran Blanc MD  [facility]  Department of Vascular Neurology   [facility]

## 2018-11-18 NOTE — ASSESSMENT & PLAN NOTE
- Area of cytotoxic cerebral edema identified when reviewing brain imaging in the territory of the right middle cerebral artery. There is no mass effect associated with it. We will continue to monitor the patients clinical exam for any worsening of symptoms which may indicate expansion of the stroke or the area of the edema resulting in the clinical change. The pattern is suggestive of embolic etiology.

## 2018-11-18 NOTE — ASSESSMENT & PLAN NOTE
43 y.o. male with significant past medical history of obesity, HTN, tobacco abuse who was transferred from [facility] after receiving IV-tPA for acute left sided weakness.  Found to have R M1 occlusion of the right middle cerebral artery and taken to interventional radiology for thrombectomy.  Patient had successful thromboaspiration with TICI 3 reperfusion       MRI brain showed large right middle cerebral artery territory infarct.  Mild petechial hemorrhage.  Utox for multiple drugs of abuse including THC with amphetamine.  Amphetamines can cause abnormal heart rhythms and could be the etiology of stroke.  Patient does have other risk factors such as family history of early stroke, obesity, tobacco abuse, hypertension.  Echo with bubble 11/12: negative for intracardiac shunt. CT 11/13 Showing 0.2cm MLS shift and mass effect.    Leukocytosis resolved and swelling in LUE improving. Tylenol PRN for shoulder pain w/ good relief.   Awaiting placement; likely d/c early next week.     Antithrombotics: asa 81mg qd     Statins for secondary stroke prevention and hyperlipidemia, if present:   Statins: Atorvastatin- 80 mg daily     Aggressive risk factor modification: HTN, Smoking, Obesity, Drug use     Rehab efforts: PT/OT/SLP to evaluate and treat inpatient rehab     Diagnostics ordered/pending: none    VTE prophylaxis: SCDs. Heparin 5000 units q 8hours     BP parameters: Infarct: Post tPA, SBP <160

## 2018-11-18 NOTE — PT/OT/SLP PROGRESS
Physical Therapy Treatment    Patient Name: Tyree Pablo  MRN: [id number]   Diagnosis: Embolic stroke involving right middle cerebral artery    Recommendations:     Discharge Recommendations:  rehabilitation facility   Discharge Equipment Recommendations: (TBD)   Barriers to Discharge: decreased caregiver assistance, fall risk, increased caregiver burden    Plan:   During this hospitalization, the patient will be seen 5 x/week for therapeutic activities, therapeutic exercises, gait training, neuromuscular re-education to address impairments and functional mobility deficits.   · Plan of Care Expires: 12/10/18   Plan of Care Reviewed with: patient, mother    This plan of care has been discussed with the patient and/or family who were involved in its development and are in agreement with the identified goals and treatment plan.     Subjective   PT communicated with RN prior to therapy.     Patient comments/goals: My L shoulder was aching yesterday.  It felt like a pulled muscle. It feels better today.  Pain/Comfort:  · Pain Rating 1: 0/10  · Pain Rating Post-Intervention 1: 0/10    Recent Vital Signs: (Last documentation)  Temp: 98.1 °F (36.7 °C) (11/18/18 0822)  Pulse: 92 (11/18/18 0822)  Resp: 17 (11/18/18 0822)  BP: (!) 147/82 (11/18/18 0822)  SpO2: 99 % (11/18/18 0822)     Objective:   General Precautions: aspiration, fall  Recent Surgery: * No surgery found *    The patient currently has bed alarm, telemetry.    The patient was found supine in bed in NAD with soiled linen.  He agreed to therapy.  Flat affect. R visual gaze preference, can over come with verbal cues.  Rolling L CGA using bed rail. Rolling R max assistance d/t poor L sided muscle activation and poor technique.  Supine to sit from L sidelying moderate assistance to remove LLE from bed,to initiate trunk elevation, and to reach midline.  Initial sitting balance min assistance, but must use R rail for balance. L sided neglect in sitting.  Mirror  feedback used to improve midline orientation, fwd gaze and posture. With mirror feedback he achieved midline sitting with CGA and both hands in lap. Without mirror feedback he spontaneously returns to R visual gaze, R spinal rotation, and scoots R hip posteriorly on bed (until he is sitting sideways).      Sit to stand 7x from EOB with RUE support on rail, max assistance.  Patient demonstrates very poor postural awareness in standing which improves with mirror feedback.  Standing posture: fwd trunk flexion, R rotation and L lean with hips, poor RLE placement (standing on toes).  PT assisted with lift, anterior weight shift, blocking L knee, midline orientation, and verbal cues/interpretation of mirror feedback to improve posture and balance.  When given verbal cues and mirror feedback for posture he was able to achieve erect posture in standing with bilateral activation of hip extensors and knee extensors, fatigues quickly 15-30 seconds.  He continues to have inconsistent foot placement of RLE (posterior with weight bearing on toes or ABD).      His mother reported he has a leg length discrepancy d/t orthopedic injuries.  He used to walk on R toes (not entire R foot prior to admit.      The patient was left with HOB elevated with all lines intact, call bell in reach, bed alarm on.     FUNCTIONAL OUTCOME MEASURES:  Turning over in bed (including adjusting bedclothes, sheets and blankets)?: 2  Sitting down on and standing up from a chair with arms (e.g., wheelchair, bedside commode, etc.): 2  Moving from lying on back to sitting on the side of the bed?: 2  Moving to and from a bed to a chair (including a wheelchair)?: 1  Need to walk in hospital room?: 1  Climbing 3-5 steps with a railing?: 1  Basic Mobility Total Score: 9    Goals:     Multidisciplinary Problems     Physical Therapy Goals        Problem: Physical Therapy Goal    Goal Priority Disciplines Outcome Goal Variances Interventions   Physical Therapy Goal      PT, PT/OT Ongoing (interventions implemented as appropriate)     Description:  Goals to be met by: 2018     Patient will increase functional independence with mobility by performin. Supine to sit with MInimal Assistance  2. Sit to supine with MInimal Assistance  3. Sit to stand transfer with Minimal Assistance  4. Bed to chair transfer with Minimal Assistance  5. Gait  x 50 feet with Moderate Assistance with or without appropriate AD.   6. Sitting at edge of bed x10 minutes with Contact Guard Assistance  7. Lower extremity exercise program x15 reps per handout, with assistance as needed                    Assessment:   Tyree Pablo is a 43 y.o. male admitted with a medical diagnosis of Embolic stroke involving right middle cerebral artery.  He had improved postural awareness and midline orientation when provided with mirror feedback in sitting and standing. He continues to require max assistance for sit to stand and standing balance with brief activation of LLE (unsustained).  He is participating well and progressing toward his goals.         Problems/Impairments:  weakness, impaired functional mobilty, decreased safety awareness, impaired cardiopulmonary response to activity, impaired endurance, impaired fine motor, impaired sensation, impaired balance, decreased upper extremity function, decreased lower extremity function, visual deficits, impaired self care skills, abnormal tone     Rehab Prognosis:  good. The patient would benefit from acute skilled PT services to address these deficits and maximize their functional independence.     Time Tracking:     PT Received On:  18  PT Start Time:   955    PT Stop Time:  1020  PT Total Time (min): 25 min     Billable Minutes: Therapeutic Activity 10 and Neuromuscular Re-education 15     Val Guerra, PT  2018  [phone] (pager)

## 2018-11-18 NOTE — SUBJECTIVE & OBJECTIVE
Neurologic Chief Complaint: right MCA infarct    Subjective:     Interval History: Patient is seen for follow-up neurological assessment and treatment recommendations:  No DVT on US and leukocytosis resolved.  Patient doing well w/o any complaints and no events overnight.  Shoulder pain relieved w/ Tylenol PRN.  Awaiting placement at Rehab.    HPI, Past Medical, Family, and Social History remains the same as documented in the initial encounter.     Review of Systems   Constitutional: Negative for chills and fever.   HENT: Positive for congestion. Negative for drooling.    Respiratory: Negative for cough and shortness of breath.    Gastrointestinal: Negative for diarrhea and vomiting.   Skin: Negative for pallor and rash.   Neurological: Positive for facial asymmetry and weakness.   Psychiatric/Behavioral: Negative for behavioral problems.     Scheduled Meds:   albuterol-ipratropium  3 mL Nebulization Q6H WAKE    amLODIPine  10 mg Oral Daily    aspirin  81 mg Oral Daily    atorvastatin  80 mg Oral Daily    FLUoxetine  10 mg Oral Daily    heparin (porcine)  5,000 Units Subcutaneous Q8H    hydrALAZINE  125 mg Oral Q8H     Continuous Infusions:  PRN Meds:acetaminophen, dextrose 50%, glucagon (human recombinant), hydrALAZINE, insulin aspart U-100, labetalol, sodium chloride 0.9%    Objective:     Vital Signs (Most Recent):  Temp: 98.1 °F (36.7 °C) (11/18/18 0822)  Pulse: 92 (11/18/18 0822)  Resp: 17 (11/18/18 0822)  BP: (!) 147/82 (11/18/18 0822)  SpO2: 99 % (11/18/18 0822)  BP Location: Left arm    Vital Signs Range (Last 24H):  Temp:  [98.1 °F (36.7 °C)-98.7 °F (37.1 °C)]   Pulse:  []   Resp:  [17-20]   BP: (145-147)/(76-91)   SpO2:  [93 %-99 %]   BP Location: Left arm    Physical Exam   Constitutional: He appears well-developed and well-nourished.   HENT:   Head: Normocephalic and atraumatic.        Cardiovascular: Normal rate and regular rhythm.   Pulmonary/Chest: Effort normal. No respiratory distress.    Skin: Skin is warm and dry.   Vitals reviewed.  Some noted swelling of the LUE, improving, and not as warm as the day prior     Neurological Exam:   LOC: alert  Attention Span: Good   Language: No aphasia  Articulation: mild dysarthria  Orientation: Person, Place, Time   Visual Fields: Hemianopsia left  EOM (CN III, IV, VI): R gaze preference  Pupils (CN II, III): PERRL  Motor: Arm left  Plegia 0/5  Leg left  Paresis: 1/5  Arm right  Normal 5/5  Leg right Normal 5/5  Sensation: intact  Tone:flaccid in left upper extremity     Laboratory:  CMP:   Recent Labs   Lab 11/18/18  0528   CALCIUM 9.7      K 4.3   CO2 27      BUN 25*   CREATININE 0.9     CBC:   Recent Labs   Lab 11/18/18  0528   WBC 6.73   RBC 5.12   HGB 13.0*   HCT 41.9      MCV 82   MCH 25.4*   MCHC 31.0*     Lipid Panel:   No results for input(s): CHOL, LDLCALC, HDL, TRIG in the last 168 hours.  Hgb A1C:   No results for input(s): HGBA1C in the last 168 hours.  TSH:   No results for input(s): TSH in the last 168 hours.    Diagnostic Results     Brain Imaging   CT head 11/13/18  Evolving right MCA infarct with 0.2cm MLS  Mass effect upon the right lateral ventricle   Petechial hemorrhage     MRI brain 11/09/18    Large right MCA infarct   Cytotoxic cerebral edema  Petechial hemorrhage inside lesion     Vessel Imaging   Cerebral angiogram 11/09/18  Thromboaspiration of the m1 segment of right middle cerebral artery with TICI 3 reperfusion.      CTA MP 11/09/18  Abrupt distal m1 occlusion.    Cardiac Imaging   Echo 11/10/18  · Left ventricle ejection fraction is normal at 65%  · Normal LV diastolic function.  · RV systolic function is normal.  · Trace tricuspid regurgitation.  · Normal central venous pressure (3 mm Hg).  · No pericardial effusion.

## 2018-11-18 NOTE — ASSESSMENT & PLAN NOTE
- Stroke risk factor.  SBP<160  - Continue Amlodipine 10mg and Hydralazine 125 mg TID  - BP well controlled since medication adjustments were made

## 2018-11-18 NOTE — ASSESSMENT & PLAN NOTE
- Admitted to [facility] for close monitoring initially  - Now stepped down to Vascular neurology service

## 2018-11-18 NOTE — ASSESSMENT & PLAN NOTE
- Noted swelling and increased temperature of overlying skin  - WBC elevated this AM; leukocytosis now resolved  - US UE venous ordered to evaluate; no DVT noted  - Swelling improving, no further complaints

## 2018-11-18 NOTE — ASSESSMENT & PLAN NOTE
- Positive Utox screen for THC, amphetamine, opioids, benzodiazepines   - Likely the etiology of stroke

## 2018-11-18 NOTE — ASSESSMENT & PLAN NOTE
- Sequelae of infarct  - PT, OT ordered; recommending rehab placement  - Continue aggressive daily therapy

## 2018-11-18 NOTE — ASSESSMENT & PLAN NOTE
- Angioedema on lips and tongue   - Improved with benadryl and steroids   - Recorded in patient's allergies

## 2018-11-19 PROBLEM — F32.A DEPRESSION: Status: ACTIVE | Noted: 2018-11-19

## 2018-11-19 PROBLEM — F19.90 SUBSTANCE USE DISORDER: Status: ACTIVE | Noted: 2018-11-19

## 2018-11-19 LAB
ANION GAP SERPL CALC-SCNC: 6 MMOL/L
BASOPHILS # BLD AUTO: 0.03 K/UL
BASOPHILS NFR BLD: 0.5 %
BUN SERPL-MCNC: 25 MG/DL
CALCIUM SERPL-MCNC: 9.7 MG/DL
CHLORIDE SERPL-SCNC: 103 MMOL/L
CO2 SERPL-SCNC: 27 MMOL/L
CREAT SERPL-MCNC: 0.9 MG/DL
DIFFERENTIAL METHOD: ABNORMAL
EOSINOPHIL # BLD AUTO: 0.2 K/UL
EOSINOPHIL NFR BLD: 2.5 %
ERYTHROCYTE [DISTWIDTH] IN BLOOD BY AUTOMATED COUNT: 16 %
EST. GFR  (AFRICAN AMERICAN): >60 ML/MIN/1.73 M^2
EST. GFR  (NON AFRICAN AMERICAN): >60 ML/MIN/1.73 M^2
GLUCOSE SERPL-MCNC: 117 MG/DL
HCT VFR BLD AUTO: 40.9 %
HGB BLD-MCNC: 13.3 G/DL
IMM GRANULOCYTES # BLD AUTO: 0.02 K/UL
IMM GRANULOCYTES NFR BLD AUTO: 0.3 %
LYMPHOCYTES # BLD AUTO: 2.2 K/UL
LYMPHOCYTES NFR BLD: 34.2 %
MAGNESIUM SERPL-MCNC: 2.7 MG/DL
MCH RBC QN AUTO: 26.3 PG
MCHC RBC AUTO-ENTMCNC: 32.5 G/DL
MCV RBC AUTO: 81 FL
MONOCYTES # BLD AUTO: 0.4 K/UL
MONOCYTES NFR BLD: 6.7 %
NEUTROPHILS # BLD AUTO: 3.6 K/UL
NEUTROPHILS NFR BLD: 55.8 %
NRBC BLD-RTO: 0 /100 WBC
PHOSPHATE SERPL-MCNC: 4.1 MG/DL
PLATELET # BLD AUTO: 332 K/UL
PMV BLD AUTO: 11.9 FL
POCT GLUCOSE: 103 MG/DL (ref 70–110)
POCT GLUCOSE: 126 MG/DL (ref 70–110)
POCT GLUCOSE: 96 MG/DL (ref 70–110)
POTASSIUM SERPL-SCNC: 4.5 MMOL/L
RBC # BLD AUTO: 5.06 M/UL
SODIUM SERPL-SCNC: 136 MMOL/L
WBC # BLD AUTO: 6.4 K/UL

## 2018-11-19 PROCEDURE — 25000242 PHARM REV CODE 250 ALT 637 W/ HCPCS: Performed by: STUDENT IN AN ORGANIZED HEALTH CARE EDUCATION/TRAINING PROGRAM

## 2018-11-19 PROCEDURE — 25000003 PHARM REV CODE 250: Performed by: PSYCHIATRY & NEUROLOGY

## 2018-11-19 PROCEDURE — 84100 ASSAY OF PHOSPHORUS: CPT

## 2018-11-19 PROCEDURE — 92526 ORAL FUNCTION THERAPY: CPT

## 2018-11-19 PROCEDURE — 94640 AIRWAY INHALATION TREATMENT: CPT

## 2018-11-19 PROCEDURE — 97535 SELF CARE MNGMENT TRAINING: CPT

## 2018-11-19 PROCEDURE — 85025 COMPLETE CBC W/AUTO DIFF WBC: CPT

## 2018-11-19 PROCEDURE — 25000003 PHARM REV CODE 250: Performed by: PHYSICIAN ASSISTANT

## 2018-11-19 PROCEDURE — 20600001 HC STEP DOWN PRIVATE ROOM

## 2018-11-19 PROCEDURE — 97112 NEUROMUSCULAR REEDUCATION: CPT

## 2018-11-19 PROCEDURE — 94761 N-INVAS EAR/PLS OXIMETRY MLT: CPT

## 2018-11-19 PROCEDURE — 63600175 PHARM REV CODE 636 W HCPCS: Performed by: PSYCHIATRY & NEUROLOGY

## 2018-11-19 PROCEDURE — 25000003 PHARM REV CODE 250: Performed by: STUDENT IN AN ORGANIZED HEALTH CARE EDUCATION/TRAINING PROGRAM

## 2018-11-19 PROCEDURE — 99233 SBSQ HOSP IP/OBS HIGH 50: CPT | Mod: ,,, | Performed by: PSYCHIATRY & NEUROLOGY

## 2018-11-19 PROCEDURE — 92507 TX SP LANG VOICE COMM INDIV: CPT

## 2018-11-19 PROCEDURE — 83735 ASSAY OF MAGNESIUM: CPT

## 2018-11-19 PROCEDURE — 80048 BASIC METABOLIC PNL TOTAL CA: CPT

## 2018-11-19 RX ORDER — FLUOXETINE HYDROCHLORIDE 20 MG/1
20 CAPSULE ORAL DAILY
Status: DISCONTINUED | OUTPATIENT
Start: 2018-11-20 | End: 2018-11-22 | Stop reason: HOSPADM

## 2018-11-19 RX ADMIN — IPRATROPIUM BROMIDE AND ALBUTEROL SULFATE 3 ML: .5; 3 SOLUTION RESPIRATORY (INHALATION) at 09:11

## 2018-11-19 RX ADMIN — FLUOXETINE 10 MG: 10 CAPSULE ORAL at 08:11

## 2018-11-19 RX ADMIN — ATORVASTATIN CALCIUM 80 MG: 20 TABLET, FILM COATED ORAL at 08:11

## 2018-11-19 RX ADMIN — HEPARIN SODIUM 5000 UNITS: 5000 INJECTION, SOLUTION INTRAVENOUS; SUBCUTANEOUS at 04:11

## 2018-11-19 RX ADMIN — IPRATROPIUM BROMIDE AND ALBUTEROL SULFATE 3 ML: .5; 3 SOLUTION RESPIRATORY (INHALATION) at 02:11

## 2018-11-19 RX ADMIN — HYDRALAZINE HYDROCHLORIDE 125 MG: 50 TABLET ORAL at 04:11

## 2018-11-19 RX ADMIN — IPRATROPIUM BROMIDE AND ALBUTEROL SULFATE 3 ML: .5; 3 SOLUTION RESPIRATORY (INHALATION) at 10:11

## 2018-11-19 RX ADMIN — HEPARIN SODIUM 5000 UNITS: 5000 INJECTION, SOLUTION INTRAVENOUS; SUBCUTANEOUS at 05:11

## 2018-11-19 RX ADMIN — HEPARIN SODIUM 5000 UNITS: 5000 INJECTION, SOLUTION INTRAVENOUS; SUBCUTANEOUS at 09:11

## 2018-11-19 RX ADMIN — HYDRALAZINE HYDROCHLORIDE 125 MG: 50 TABLET ORAL at 05:11

## 2018-11-19 RX ADMIN — AMLODIPINE BESYLATE 10 MG: 10 TABLET ORAL at 08:11

## 2018-11-19 RX ADMIN — HYDRALAZINE HYDROCHLORIDE 125 MG: 50 TABLET ORAL at 09:11

## 2018-11-19 RX ADMIN — ASPIRIN 81 MG CHEWABLE TABLET 81 MG: 81 TABLET CHEWABLE at 08:11

## 2018-11-19 NOTE — ASSESSMENT & PLAN NOTE
- Positive Utox screen for THC, amphetamine, opioids, benzodiazepines   - Likely the etiology of stroke   -patient would like to be enrolled in a substance abuse program after inpatient rehab

## 2018-11-19 NOTE — PLAN OF CARE
Tami SW meet with Pt. And Pt's mother at bedside.  Pt's mother handed SW her phone where she had Pt's spouse, Zechariah ([phone]) on the phone.  Spouse [facility] [place], [facility].  Referrals were sent to [facility].     Per the Team during huddle it was requested that Pt. Was provided with a List of  rehabs, however Pt. Refused SA referrals.     Progress notes, Labs, Rad[facility] request from Sumi in admissions.    SW will continue to follow and assist.     Parker Villa LMSW

## 2018-11-19 NOTE — PLAN OF CARE
Problem: Fall Risk (Adult)  Goal: Absence of Falls  Patient will demonstrate the desired outcomes by discharge/transition of care.  Outcome: Ongoing (interventions implemented as appropriate)  Pt free of falls, trauma and injury. Skin remains clean dry and intact.  Pt educated on importance of measuring accurate  I/O. Reviewed plan of care with Pt and pt verbalized understanding.  Bg monitored. Pt VSS, will continue to monitor.

## 2018-11-19 NOTE — PLAN OF CARE
Problem: Occupational Therapy Goal  Goal: Occupational Therapy Goal  Goals set 11/16 to be addressed for 7 days with expiration date, 11/23:  Patient will increase functional independence with ADLs by performing:    Patient will demonstrate rolling to the right with min assist.  MET  Patient will demonstrate rolling to the left with modified independence.   Not met  Patient will demonstrate supine -sit with SBA.   Not met  Patient will demonstrate stand pivot transfers with min assist.   Not met  Patient will demonstrate grooming while standing with min assist.   Not met  Patient will demonstrate upper body dressing with min assist while seated EOB.   Not met  Patient will demonstrate lower body dressing with mod assist while seated EOB.   Not met  Patient will demonstrate toileting with mod assist.   Not met  Patient's family / caregiver will demonstrate independence and safety with assisting patient with self-care skills and functional mobility.     Not met  Patient and/or patient's family will verbalize understanding of stroke prevention guidelines, personal risk factors and stroke warning signs via teachback method.  Not met             Goals remain appropriate.  GABRIELLE Bradshaw  11/19/2018

## 2018-11-19 NOTE — PROGRESS NOTES
[facility]  Vascular Neurology  Comprehensive Stroke Center  Progress Note    Assessment/Plan:     * Embolic stroke involving right middle cerebral artery    43 y.o. male with significant past medical history of obesity, HTN, tobacco abuse who was transferred from [facility] after receiving IV-tPA for acute left sided weakness.  Found to have R M1 occlusion of the right middle cerebral artery and taken to interventional radiology for thrombectomy.  Patient had successful thromboaspiration with TICI 3 reperfusion       MRI brain showed large right middle cerebral artery territory infarct.  Mild petechial hemorrhage.  Etiology ESUS.  Utox for multiple drugs of abuse including THC with amphetamine.  Amphetamines can cause abnormal heart rhythms and could be the etiology of stroke.  Patient does have other risk factors such as family history of early stroke, obesity, tobacco abuse, hypertension.  Echo with bubble 11/12: negative for intracardiac shunt.   Patient needs 30 day event monitor     Antithrombotics: asa 81mg qd     Statins for secondary stroke prevention and hyperlipidemia, if present:   Statins: Atorvastatin- 80 mg daily     Aggressive risk factor modification: HTN, Smoking, Obesity, Drug use     Rehab efforts: PT/OT/SLP to evaluate and treat inpatient rehab     Diagnostics ordered/pending: hypercoagulable panel     VTE prophylaxis: SCDs. Heparin 5000 units q 8hours     BP parameters: Infarct: Post tPA, SBP <160    Patient needs thirty day event monitor after rehab          Substance abuse    - Positive Utox screen for THC, amphetamine, opioids, benzodiazepines   - Likely the etiology of stroke   -patient would like to be enrolled in a substance abuse program after inpatient rehab        Cytotoxic cerebral edema    - Area of cytotoxic cerebral edema identified when reviewing brain imaging in the territory of the right middle cerebral artery. There is no mass effect  associated with it. We will continue to monitor the patients clinical exam for any worsening of symptoms which may indicate expansion of the stroke or the area of the edema resulting in the clinical change. The pattern is suggestive of embolic etiology.             Essential hypertension    - Stroke risk factor.  SBP<160  - Continue Amlodipine 10mg and Hydralazine 125 mg TID  - BP well controlled since medication adjustments were made     Depression    Likely due to stroke  No SI, HI  Fluoxetine started       Angioedema due to angiotensin converting enzyme inhibitor (ACE-I)    - Angioedema on lips and tongue   - Improved with benadryl and steroids   - Recorded in patient's allergies        Mixed hyperlipidemia    - Risk factor for stroke  -    - Continue Atorvastatin 80mg QHS     Dysarthria    - Due to stroke   - SLP ordered and following      Tobacco abuse    - Stroke risk factor.  Encourage cessation.  - Nicotine patch PRN     S/P admn tPA in diff fac w/n last 24 hr bef adm to crnt fac    - Admitted to [facility] for close monitoring initially  - Now stepped down to Vascular neurology service     Acute left-sided weakness    - Sequelae of infarct  - PT, OT ordered; recommending rehab placement  - Continue aggressive daily therapy            11/12/18 Investigating etiologies of stroke.  Hypercoagulable and echo with bubble ordered. Patient utox + for amphetamines, THC, benzo, opiates.     11/13/18 NAEON patient neurologically stable.  Repeat CT head showing 0.2 cm midline shift and mass effect.  NCC will continue to monitor cerebral edema.  11/14/18 Patient experiencing angioedema from lisinopril.  Will stay in in the ICU for intubation watch.  IV benadryl and solumedrol ordered   11/15/18 angioedema improved with steroids and benadryl  11/16/18. Patient neurologically stable on exam with continued left sided weakness. -191 and HR  in last 24 hour. Discussed with Dr. Dunbar, continue amlodipine 10mg  and increase hydralazine to 150mg TID. Plan to increase Lipitor to 80mg daily per Dr. Kapoor since LDL 14.  hypercoagulable panel overall negative, Lipoprotein A elevated at 134. Pending Rehab placement.   11/17/2018 Patient w/ new leukocytosis noted and increased temp and swelling in the LUE; US ordered to evaluate the vasculature. No other complaints at this time. Awaiting placement to Rehab. Starting Fluoxetine 10 mg QD  11/18/2018 No DVT on US. Patient doing well. Shoulder pain relieved w/ Tylenol PRN  11/19/18 Neurologically stable.  Awaiting placement to inpatient rehab. Patient is agreeable to go to substance abuse program after inpatient rehab.        STROKE DOCUMENTATION   Acute Stroke Times   Last Known Normal Date: 11/08/18  Last Known Normal Time: 1930  Symptom Onset Date: 11/08/18  Symptom Onset Time: 2015  Stroke Team Called Date: 11/08/18  Stroke Team Called Time: 2212  Stroke Team Arrival Date: 11/08/18  Stroke Team Arrival Time: 2213  CT Interpretation Time: 2213(no acute findings)  Decision to Treat Time for Alteplase: 2228(tPA administered)  Decision to Treat Time for IR: 0347(no intervention at this time)    NIH Scale:  1a. Level Of Consciousness: 0-->Alert: keenly responsive  1b. LOC Questions: 0-->Answers both questions correctly  1c. LOC Commands: 0-->Performs both tasks correctly  3. Visual: 1-->Partial hemianopia  4. Facial Palsy: 2-->Partial paralysis (total or near-total paralysis of lower face)  5a. Motor Arm, Left: 4-->No movement  5b. Motor Arm, Right: 0-->No drift: limb holds 90 (or 45) degrees for full 10 secs  6a. Motor Leg, Left: 3-->No effort against gravity: leg falls to bed immediately  6b. Motor Leg, Right: 0-->No drift: leg holds 30 degree position for full 5 secs  7. Limb Ataxia: 0-->Absent  8. Sensory: 0-->Normal: no sensory loss  9. Best Language: 0-->No aphasia: normal  10. Dysarthria: 1-->Mild-to-moderate dysarthria: patient slurs at least some words and, at worst, can be  understood with some difficulty  11. Extinction and Inattention (formerly Neglect): 0-->No abnormality       Modified Danilo Score: 0  Genny Coma Scale:    ABCD2 Score:    [id number] Score:   HAS -BLED Score:   ICH Score:   [employer] Classification:      Hemorrhagic change of an Ischemic Stroke: Does this patient have an ischemic stroke with hemorrhagic changes? No     Neurologic Chief Complaint: right MCA infarct    Subjective:     Interval History: Patient is seen for follow-up neurological assessment and treatment recommendations: Neurologically stable.  Awaiting placement to inpatient rehab. Patient is agreeable to go to substance abuse program after inpatient rehab.    HPI, Past Medical, Family, and Social History remains the same as documented in the initial encounter.     Review of Systems   Constitutional: Negative for chills and fever.   HENT: Negative for congestion and drooling.    Respiratory: Negative for cough and shortness of breath.    Gastrointestinal: Negative for diarrhea and vomiting.   Skin: Negative for pallor and rash.   Neurological: Positive for facial asymmetry and weakness.   Psychiatric/Behavioral: Negative for behavioral problems.     Scheduled Meds:   albuterol-ipratropium  3 mL Nebulization Q6H WAKE    amLODIPine  10 mg Oral Daily    aspirin  81 mg Oral Daily    atorvastatin  80 mg Oral Daily    FLUoxetine  10 mg Oral Daily    heparin (porcine)  5,000 Units Subcutaneous Q8H    hydrALAZINE  125 mg Oral Q8H     Continuous Infusions:  PRN Meds:acetaminophen, dextrose 50%, glucagon (human recombinant), hydrALAZINE, insulin aspart U-100, labetalol, sodium chloride 0.9%    Objective:     Vital Signs (Most Recent):  Temp: 99.4 °F (37.4 °C) (11/19/18 1348)  Pulse: 96 (11/19/18 1412)  Resp: 12 (11/19/18 1412)  BP: (!) 145/87 (11/19/18 1348)  SpO2: 97 % (11/19/18 1412)  BP Location: Left arm    Vital Signs Range (Last 24H):  Temp:  [98.4 °F (36.9 °C)-99.4 °F (37.4 °C)]   Pulse:   []   Resp:  [12-18]   BP: (120-175)/(74-89)   SpO2:  [91 %-99 %]   BP Location: Left arm    Physical Exam   Constitutional: He appears well-developed and well-nourished.   HENT:   Head: Normocephalic and atraumatic.        Cardiovascular: Normal rate and regular rhythm.   Pulmonary/Chest: Effort normal. No respiratory distress.   Skin: Skin is warm and dry.   Vitals reviewed.       Neurological Exam:   LOC: alert  Attention Span: Good   Language: No aphasia  Articulation: mild dysarthria   Orientation: Person, Place, Time   Visual Fields: Hemianopsia left improving   EOM (CN III, IV, VI): R gaze preference  Pupils (CN II, III): PERRL  Motor: Arm left  Plegia 0/5  Leg left  Paresis: 2/5  Arm right  Normal 5/5  Leg right Normal 5/5  Sensation: intact  Tone:flaccid in left upper extremity     Laboratory:  CMP:   Recent Labs   Lab 11/19/18  0600   CALCIUM 9.7      K 4.5   CO2 27      BUN 25*   CREATININE 0.9     CBC:   Recent Labs   Lab 11/19/18  0600   WBC 6.40   RBC 5.06   HGB 13.3*   HCT 40.9      MCV 81*   MCH 26.3*   MCHC 32.5     Lipid Panel:   No results for input(s): CHOL, LDLCALC, HDL, TRIG in the last 168 hours.  Hgb A1C:   No results for input(s): HGBA1C in the last 168 hours.  TSH:   No results for input(s): TSH in the last 168 hours.    Diagnostic Results     Brain Imaging   CT head 11/13/18  Evolving right MCA infarct with 0.2cm MLS  Mass effect upon the right lateral ventricle   Petechial hemorrhage     MRI brain 11/09/18    Large right MCA infarct   Cytotoxic cerebral edema  Petechial hemorrhage inside lesion     Vessel Imaging   Cerebral angiogram 11/09/18  Thromboaspiration of the m1 segment of right middle cerebral artery with TICI 3 reperfusion.      CTA MP 11/09/18  Abrupt distal m1 occlusion.    Cardiac Imaging   Echo 11/10/18  · Left ventricle ejection fraction is normal at 65%  · Normal LV diastolic function.  · RV systolic function is normal.  · Trace tricuspid  regurgitation.  · Normal central venous pressure (3 mm Hg).  · No pericardial effusion.             Fabiana Michael PA-C  [facility]  Department of Vascular Neurology   [facility]

## 2018-11-19 NOTE — PLAN OF CARE
Problem: Patient Care Overview  Goal: Plan of Care Review    Recommendations     Recommendation/Intervention: 1. Continue mechanical soft diet as tolerated.  Goals: 1. Pt with >75% intake meals.    Assessment and Plan     Nutrition Problem  Swallowing difficulty     Related to (etiology):   stroke     Signs and Symptoms (as evidenced by):   Pt on mechanical soft diet

## 2018-11-19 NOTE — ASSESSMENT & PLAN NOTE
43 y.o. male with significant past medical history of obesity, HTN, tobacco abuse who was transferred from [facility] after receiving IV-tPA for acute left sided weakness.  Found to have R M1 occlusion of the right middle cerebral artery and taken to interventional radiology for thrombectomy.  Patient had successful thromboaspiration with TICI 3 reperfusion       MRI brain showed large right middle cerebral artery territory infarct.  Mild petechial hemorrhage.  Etiology ESUS.  Utox for multiple drugs of abuse including THC with amphetamine.  Amphetamines can cause abnormal heart rhythms and could be the etiology of stroke.  Patient does have other risk factors such as family history of early stroke, obesity, tobacco abuse, hypertension.  Echo with bubble 11/12: negative for intracardiac shunt.   Patient needs 30 day event monitor     Antithrombotics: asa 81mg qd     Statins for secondary stroke prevention and hyperlipidemia, if present:   Statins: Atorvastatin- 80 mg daily     Aggressive risk factor modification: HTN, Smoking, Obesity, Drug use     Rehab efforts: PT/OT/SLP to evaluate and treat inpatient rehab     Diagnostics ordered/pending: hypercoagulable panel     VTE prophylaxis: SCDs. Heparin 5000 units q 8hours     BP parameters: Infarct: Post tPA, SBP <160    Patient needs thirty day event monitor after rehab

## 2018-11-19 NOTE — SUBJECTIVE & OBJECTIVE
Neurologic Chief Complaint: right MCA infarct    Subjective:     Interval History: Patient is seen for follow-up neurological assessment and treatment recommendations: Neurologically stable.  Awaiting placement to inpatient rehab. Patient is agreeable to go to substance abuse program after inpatient rehab.    HPI, Past Medical, Family, and Social History remains the same as documented in the initial encounter.     Review of Systems   Constitutional: Negative for chills and fever.   HENT: Negative for congestion and drooling.    Respiratory: Negative for cough and shortness of breath.    Gastrointestinal: Negative for diarrhea and vomiting.   Skin: Negative for pallor and rash.   Neurological: Positive for facial asymmetry and weakness.   Psychiatric/Behavioral: Negative for behavioral problems.     Scheduled Meds:   albuterol-ipratropium  3 mL Nebulization Q6H WAKE    amLODIPine  10 mg Oral Daily    aspirin  81 mg Oral Daily    atorvastatin  80 mg Oral Daily    FLUoxetine  10 mg Oral Daily    heparin (porcine)  5,000 Units Subcutaneous Q8H    hydrALAZINE  125 mg Oral Q8H     Continuous Infusions:  PRN Meds:acetaminophen, dextrose 50%, glucagon (human recombinant), hydrALAZINE, insulin aspart U-100, labetalol, sodium chloride 0.9%    Objective:     Vital Signs (Most Recent):  Temp: 99.4 °F (37.4 °C) (11/19/18 1348)  Pulse: 96 (11/19/18 1412)  Resp: 12 (11/19/18 1412)  BP: (!) 145/87 (11/19/18 1348)  SpO2: 97 % (11/19/18 1412)  BP Location: Left arm    Vital Signs Range (Last 24H):  Temp:  [98.4 °F (36.9 °C)-99.4 °F (37.4 °C)]   Pulse:  []   Resp:  [12-18]   BP: (120-175)/(74-89)   SpO2:  [91 %-99 %]   BP Location: Left arm    Physical Exam   Constitutional: He appears well-developed and well-nourished.   HENT:   Head: Normocephalic and atraumatic.        Cardiovascular: Normal rate and regular rhythm.   Pulmonary/Chest: Effort normal. No respiratory distress.   Skin: Skin is warm and dry.   Vitals  reviewed.       Neurological Exam:   LOC: alert  Attention Span: Good   Language: No aphasia  Articulation: mild dysarthria   Orientation: Person, Place, Time   Visual Fields: Hemianopsia left improving   EOM (CN III, IV, VI): R gaze preference  Pupils (CN II, III): PERRL  Motor: Arm left  Plegia 0/5  Leg left  Paresis: 2/5  Arm right  Normal 5/5  Leg right Normal 5/5  Sensation: intact  Tone:flaccid in left upper extremity     Laboratory:  CMP:   Recent Labs   Lab 11/19/18  0600   CALCIUM 9.7      K 4.5   CO2 27      BUN 25*   CREATININE 0.9     CBC:   Recent Labs   Lab 11/19/18  0600   WBC 6.40   RBC 5.06   HGB 13.3*   HCT 40.9      MCV 81*   MCH 26.3*   MCHC 32.5     Lipid Panel:   No results for input(s): CHOL, LDLCALC, HDL, TRIG in the last 168 hours.  Hgb A1C:   No results for input(s): HGBA1C in the last 168 hours.  TSH:   No results for input(s): TSH in the last 168 hours.    Diagnostic Results     Brain Imaging   CT head 11/13/18  Evolving right MCA infarct with 0.2cm MLS  Mass effect upon the right lateral ventricle   Petechial hemorrhage     MRI brain 11/09/18    Large right MCA infarct   Cytotoxic cerebral edema  Petechial hemorrhage inside lesion     Vessel Imaging   Cerebral angiogram 11/09/18  Thromboaspiration of the m1 segment of right middle cerebral artery with TICI 3 reperfusion.      CTA MP 11/09/18  Abrupt distal m1 occlusion.    Cardiac Imaging   Echo 11/10/18  · Left ventricle ejection fraction is normal at 65%  · Normal LV diastolic function.  · RV systolic function is normal.  · Trace tricuspid regurgitation.  · Normal central venous pressure (3 mm Hg).  · No pericardial effusion.

## 2018-11-19 NOTE — ASSESSMENT & PLAN NOTE
- Admitted to [facility] for close monitoring initially  - Now stepped down to Vascular neurology service

## 2018-11-19 NOTE — PROGRESS NOTES
" [facility]  Adult Nutrition  Progress Note    SUMMARY       Recommendations    Recommendation/Intervention: 1. Continue mechanical soft diet as tolerated.  Goals: 1. Pt with >75% intake meals.  Nutrition Goal Status: goal met  Communication of RD Recs: discussed on rounds    Reason for Assessment    Reason for Assessment: length of stay  Diagnosis: stroke/CVA  Relevant Medical History: HTN  Interdisciplinary Rounds: attended  General Information Comments: Pt s/p stroke with amphetamine addiction. Pt was sleeping appears obese, nourished. Family member reports pt had no weight loss PTA and pt ate all of his breakfast this morning, tolerating well.    Nutrition Risk Screen    Nutrition Risk Screen: no indicators present    Nutrition/Diet History    Do you have any cultural, spiritual, Judaism conflicts, given your current situation?: none    Anthropometrics    Temp: 98.7 °F (37.1 °C)  Height Method: Stated  Height: 5' 8" (172.7 cm)  Height (inches): 68 in  Weight Method: Bed Scale  Weight: 99.5 kg (219 lb 5.7 oz)  Weight (lb): 219.36 lb  Ideal Body Weight (IBW), Male: 154 lb  % Ideal Body Weight, Male (lb): 147.4 lb  BMI (Calculated): 34.6       Lab/Procedures/Meds    Pertinent Labs Reviewed: reviewed  Pertinent Labs Comments: Glu 117, POCT glu 126, A1c 5.9%, BUN 25, magnesium 2.7  Pertinent Medications Reviewed: reviewed  Pertinent Medications Comments: statin    Physical Findings/Assessment    Overall Physical Appearance: obese, nourished    Estimated/Assessed Needs    Weight Used For Calorie Calculations: 99.5 kg (219 lb 5.7 oz)  Energy Calorie Requirements (kcal): 2250  Energy Need Method: Nance-St Jeor(PAL 1.2)  Protein Requirements:   Weight Used For Protein Calculations: 99.5 kg (219 lb 5.7 oz)  RDA Method (mL): 2250     Nutrition Prescription Ordered    Current Diet Order: mechanical soft diet    Evaluation of Received Nutrient/Fluid Intake       % Intake of Estimated Energy " Needs: 75 - 100 %  % Meal Intake: 75 - 100 %    Nutrition Risk    Level of Risk/Frequency of Follow-up: low     Assessment and Plan    Nutrition Problem  Swallowing difficulty    Related to (etiology):   stroke    Signs and Symptoms (as evidenced by):   Pt on mechanical soft diet    Nutrition Diagnosis Status:   New    Monitor and Evaluation    Food and Nutrient Intake: energy intake, food and beverage intake  Food and Nutrient Adminstration: diet order  Anthropometric Measurements: weight, weight change, body mass index  Biochemical Data, Medical Tests and Procedures: electrolyte and renal panel, gastrointestinal profile, glucose/endocrine profile, inflammatory profile, lipid profile  Nutrition-Focused Physical Findings: overall appearance     Nutrition Follow-Up    RD Follow-up?: Yes

## 2018-11-19 NOTE — PT/OT/SLP PROGRESS
Speech Language Pathology Treatment    Patient Name:  Tyree Pablo   MRN:  [id number]  Admitting Diagnosis: Embolic stroke involving right middle cerebral artery    Recommendations:                 General Recommendations:  Dysphagia therapy, Speech/language therapy and Cognitive-linguistic therapy  Diet recommendations:  Mechanical soft, Liquid Diet Level: Thin   Aspiration Precautions: 1 bite/sip at a time, Alternating bites/sips, Check for pocketing/oral residue, Feed only when awake/alert, HOB to 90 degrees, Monitor for s/s of aspiration and Strict aspiration precautions   General Precautions: Standard, aspiration, fall, dental soft, vision impaired  Communication strategies:  none    Subjective     Awake;alert.     Pain/Comfort:  · Pain Rating 1: 0/10    Objective:     Has the patient been evaluated by SLP for swallowing?   Yes  Keep patient NPO? No   Current Respiratory Status: room air      Patient seen for ongoing dysphagia/speech language therapy. Patient tolerated thin liquids via straw sips x4 along with regular solid x3 with no overt signs of airway compromise, though continues with mild L oral cavity pocketing.  Patient completed 4 item mental manipulation tasks with 0% acc indep, improving to 60% acc given multiple reps of stim and coaching.  Patient provided 4-6 items within concrete category independently, improving to ~8 items given semantic cueing. Repeated items noted at this time. Patient with poor sustained attention to therapy tasks past ~1min. Skilled education was provided to patient and family member regarding diet recommendations, aspiration precautions, and ongoing ST plan of care. Patient verbalized understanding.     Assessment:     Tyree Pablo is a 43 y.o. male with an SLP diagnosis of Dysphagia, Dysarthria, Cognitive-Linguistic Impairment and Visio-Spatial Impairment.      Goals:   Multidisciplinary Problems     SLP Goals        Problem: SLP Goal    Goal Priority Disciplines Outcome    SLP Goal     SLP Ongoing (interventions implemented as appropriate)   Description:  Speech Language Pathology Goals  Goals expected to be met by 11/26:  1. Patient will tolerate a dental soft diet and thin liquids with no overt signs of airway compromise.   2. Patient will complete OME's x10 each for improved lingual/labial strength and ROM.  3. Patient will demonstrate sustained attention to therapy tasks for 2 min with no cues.   4. Patient will provide 12+ items within concrete category in 1 minute indep.   5. Patient will complete mental manipulation tasks with 80% acc indep.   6. Patient will complete high level problem solving/reasoning tasks with 80% acc indep.     Goals expected to be met by 11/19  1. Patient will tolerate a dental soft diet and thin liquids with no overt signs of airway compromise.   2. Patient will complete OME's x10 each for improved lingual/labial strength and ROM.  3. Patient will demonstrate sustained attention to therapy tasks for 2 min with no cues.   4. Patient will provide 12+ items within concrete category in 1 minute indep.   5. Patient will complete mental manipulation tasks with 80% acc indep.   6. Patient will complete high level problem solving/reasoning tasks with 80% acc indep.   7. Patient will complete visual spatial/tracking tasks to improve left neglect given mod A.   7. Patient will participate in further assessments regarding reading and writing.     Goals due 11/16  1.  [facility] thin liquids with no s/s of aspiration  2.  Participate in speech language evaluation                      Plan:     · Patient to be seen:  4 x/week   · Plan of Care expires:  12/07/18  · Plan of Care reviewed with:  patient, mother   · SLP Follow-Up:  Yes       Discharge recommendations:  rehabilitation facility     Time Tracking:     SLP Treatment Date:   11/19/18  Speech Start Time:  0950  Speech Stop Time:  1007     Speech Total Time (min):  17  min    Billable Minutes: Speech Therapy Individual 9 and Treatment Swallowing Dysfunction 8    Emily P. Abadie M.S., CCC-SLP  Speech Language Pathologist  [phone]  11/19/2018

## 2018-11-19 NOTE — PT/OT/SLP PROGRESS
"Occupational Therapy   Treatment    Name: Tyree Pablo  MRN: [id number]  Admitting Diagnosis:  Embolic stroke involving right middle cerebral artery       Recommendations:     Discharge Recommendations: rehabilitation facility  Discharge Equipment Recommendations:  3-in-1 commode, bath bench  Barriers to discharge:  Inaccessible home environment, Decreased caregiver support    Subjective   Patient:  "I'm sorry I had an accident."  Pain/Comfort:  · Pain Rating 1: 0/10  · Pain Rating Post-Intervention 1: 0/10    Objective:     Communicated with: Nurse prior to session.  Patient found with all lines intact, call button in reach and bed alarm on and bed alarm, SCD, telemetry, peripheral IV upon OT entry to room.  Patient's mother present.  General Precautions: Standard, aspiration, fall   Orthopedic Precautions:N/A   Braces: N/A     Occupational Performance:    Bed Mobility:    · Patient completed Rolling/Turning to Left with  stand by assistance  · Patient completed Rolling/Turning to Right with moderate assistance with assistance to position left UE/LE.  · Patient completed Scooting/Bridging with moderate assistance  · Patient completed Supine to Sit with moderate assistance  · Patient completed Sit to Supine with moderate assistance     Functional Mobility/Transfers:  · Mod-Max assist with sit- squat transitions and with scooting along the EOB.    Activities of Daily Living:  · Grooming: minimum assistance while seated EOB with left UE in weight bearing   · Toileting: moderate assistance with use of urinal    AMPAC 6 Click ADL: 12    Treatment & Education:  Patient education provided for stroke warning signs, prevention guidelines and personal risk factors.  Patient verbalizing understanding via teach back method.   Patient education provided on role of OT and need for rehab upon discharge.  Patient education provided on hemiplegic dressing technique, left UE weight bearing / positioning, postural control, and " transfers.  Continued education, patient/ family training recommended. Patient alert and oriented x 3; able to follow 4/4 one step commands.  Patient attentive and interactive throughout the session. Addressed increasing attention to the left.  PROM performed left UE one set x 10 rep in all planes of motion with stretches provided at end range; sustained stretch provided for external rotation.  Assistance and facilitation provided for upward rotation of the scapula during shoulder flexion and abduction.  Addressed left UE weight bearing while seated EOB and during transitional movement patterns.  SBA with postural control while seated EOB with left UE in weight bearing.  Positioning provided for midline orientation with bilateral UEs elevated and heels lifted off mattress.  Gentle cervical rotation provided.  Patient's functional status and disposition recommendation discussed with patient and nurse.  White board updated in patient's room.  OT asked if there were any other questions; patient/ family had no further questions.     Patient left supine with all lines intact and call button in reach  Education:    Assessment:     Tyree Pablo is a 43 y.o. male with a medical diagnosis of Embolic stroke involving right middle cerebral artery.  He presents with the following performance deficits affecting function are weakness, impaired endurance, impaired sensation, impaired self care skills, impaired functional mobilty, gait instability, impaired balance, visual deficits, impaired cognition, decreased coordination, decreased upper extremity function, decreased lower extremity function, decreased safety awareness, abnormal tone, decreased ROM, impaired coordination, impaired fine motor.     Rehab Prognosis:  Good; patient would benefit from acute skilled OT services to address these deficits and reach maximum level of function.       Plan:     Patient to be seen 5 x/week to address the above listed problems via  self-care/home management, therapeutic activities, therapeutic exercises, neuromuscular re-education, cognitive retraining, sensory integration  · Plan of Care Expires: 12/08/18  · Plan of Care Reviewed with: patient, mother    This Plan of care has been discussed with the patient who was involved in its development and understands and is in agreement with the identified goals and treatment plan    GOALS:   Multidisciplinary Problems     Occupational Therapy Goals        Problem: Occupational Therapy Goal    Goal Priority Disciplines Outcome Interventions   Occupational Therapy Goal     OT, PT/OT Ongoing (interventions implemented as appropriate)    Description:  Goals set 11/19 to be addressed for 7 days with expiration date, 11/26:  Patient will increase functional independence with ADLs by performing:    Patient will demonstrate rolling to the right with min assist.  Not met  Patient will demonstrate rolling to the left with modified independence.   Not met  Patient will demonstrate supine -sit with SBA.   Not met  Patient will demonstrate stand pivot transfers with min assist.   Not met  Patient will demonstrate grooming while standing with min assist.   Not met  Patient will demonstrate upper body dressing with min assist while seated EOB.   Not met  Patient will demonstrate lower body dressing with mod assist while seated EOB.   Not met  Patient will demonstrate toileting with mod assist.   Not met  Patient's family / caregiver will demonstrate independence and safety with assisting patient with self-care skills and functional mobility.     Not met  Patient and/or patient's family will verbalize understanding of stroke prevention guidelines, personal risk factors and stroke warning signs via teachback method.  Not met                               Time Tracking:     OT Date of Treatment: 11/19/18  OT Start Time: 1310  OT Stop Time: 1347  OT Total Time (min): 37 min    Billable Minutes:Self Care/Home Management  17  Neuromuscular Re-education 20    Rebecca Conrad, LOTR  11/19/2018

## 2018-11-19 NOTE — PLAN OF CARE
11/19/18 1202   Post-Acute Status   Post-Acute Authorization Placement   Post-Acute Placement Status Referrals Sent

## 2018-11-20 PROCEDURE — 92507 TX SP LANG VOICE COMM INDIV: CPT

## 2018-11-20 PROCEDURE — 94761 N-INVAS EAR/PLS OXIMETRY MLT: CPT

## 2018-11-20 PROCEDURE — 99233 SBSQ HOSP IP/OBS HIGH 50: CPT | Mod: ,,, | Performed by: PSYCHIATRY & NEUROLOGY

## 2018-11-20 PROCEDURE — 25000003 PHARM REV CODE 250: Performed by: PHYSICIAN ASSISTANT

## 2018-11-20 PROCEDURE — 20600001 HC STEP DOWN PRIVATE ROOM

## 2018-11-20 PROCEDURE — 92526 ORAL FUNCTION THERAPY: CPT

## 2018-11-20 PROCEDURE — 25000003 PHARM REV CODE 250: Performed by: STUDENT IN AN ORGANIZED HEALTH CARE EDUCATION/TRAINING PROGRAM

## 2018-11-20 PROCEDURE — 97535 SELF CARE MNGMENT TRAINING: CPT

## 2018-11-20 PROCEDURE — 94640 AIRWAY INHALATION TREATMENT: CPT

## 2018-11-20 PROCEDURE — 25000003 PHARM REV CODE 250: Performed by: PSYCHIATRY & NEUROLOGY

## 2018-11-20 PROCEDURE — 25000242 PHARM REV CODE 250 ALT 637 W/ HCPCS: Performed by: STUDENT IN AN ORGANIZED HEALTH CARE EDUCATION/TRAINING PROGRAM

## 2018-11-20 PROCEDURE — 63600175 PHARM REV CODE 636 W HCPCS: Performed by: PSYCHIATRY & NEUROLOGY

## 2018-11-20 RX ADMIN — AMLODIPINE BESYLATE 10 MG: 10 TABLET ORAL at 08:11

## 2018-11-20 RX ADMIN — HEPARIN SODIUM 5000 UNITS: 5000 INJECTION, SOLUTION INTRAVENOUS; SUBCUTANEOUS at 05:11

## 2018-11-20 RX ADMIN — HEPARIN SODIUM 5000 UNITS: 5000 INJECTION, SOLUTION INTRAVENOUS; SUBCUTANEOUS at 09:11

## 2018-11-20 RX ADMIN — ASPIRIN 81 MG CHEWABLE TABLET 81 MG: 81 TABLET CHEWABLE at 08:11

## 2018-11-20 RX ADMIN — IPRATROPIUM BROMIDE AND ALBUTEROL SULFATE 3 ML: .5; 3 SOLUTION RESPIRATORY (INHALATION) at 02:11

## 2018-11-20 RX ADMIN — IPRATROPIUM BROMIDE AND ALBUTEROL SULFATE 3 ML: .5; 3 SOLUTION RESPIRATORY (INHALATION) at 07:11

## 2018-11-20 RX ADMIN — HEPARIN SODIUM 5000 UNITS: 5000 INJECTION, SOLUTION INTRAVENOUS; SUBCUTANEOUS at 02:11

## 2018-11-20 RX ADMIN — HYDRALAZINE HYDROCHLORIDE 125 MG: 50 TABLET ORAL at 05:11

## 2018-11-20 RX ADMIN — IPRATROPIUM BROMIDE AND ALBUTEROL SULFATE 3 ML: .5; 3 SOLUTION RESPIRATORY (INHALATION) at 08:11

## 2018-11-20 RX ADMIN — HYDRALAZINE HYDROCHLORIDE 125 MG: 50 TABLET ORAL at 09:11

## 2018-11-20 RX ADMIN — ATORVASTATIN CALCIUM 80 MG: 20 TABLET, FILM COATED ORAL at 08:11

## 2018-11-20 RX ADMIN — HYDRALAZINE HYDROCHLORIDE 125 MG: 50 TABLET ORAL at 02:11

## 2018-11-20 RX ADMIN — FLUOXETINE 20 MG: 20 CAPSULE ORAL at 08:11

## 2018-11-20 NOTE — ASSESSMENT & PLAN NOTE
43 y.o. male with significant past medical history of obesity, HTN, tobacco abuse who was transferred from [facility] after receiving IV-tPA for acute left sided weakness.  Found to have R M1 occlusion of the right middle cerebral artery and taken to interventional radiology for thrombectomy.  Patient had successful thromboaspiration with TICI 3 reperfusion       MRI brain showed large right middle cerebral artery territory infarct.  Mild petechial hemorrhage.  Etiology ESUS.  Utox for multiple drugs of abuse including THC with amphetamine.  Amphetamines can cause abnormal heart rhythms and could be the etiology of stroke.  Patient does have other risk factors such as family history of early stroke, obesity, tobacco abuse, hypertension.  Echo with bubble 11/12: negative for intracardiac shunt.   Patient needs 30 day event monitor     Antithrombotics: asa 81mg qd     Statins for secondary stroke prevention and hyperlipidemia, if present:   Statins: Atorvastatin- 80 mg daily     Aggressive risk factor modification: HTN, Smoking, Obesity, Drug use     Rehab efforts: PT/OT/SLP to evaluate and treat inpatient rehab     Diagnostics ordered/pending: hypercoagulable panel     VTE prophylaxis: SCDs. Heparin 5000 units q 8hours     BP parameters: Infarct: Post tPA, SBP <160    Patient needs thirty day event monitor after rehab

## 2018-11-20 NOTE — PLAN OF CARE
CM spoke to Bell at [facility]-patient has been accepted-pending insurance auth which she has submitted today.     11/20/18 1053   Post-Acute Status   Post-Acute Authorization Placement   Post-Acute Placement Status Pending Payor Review

## 2018-11-20 NOTE — PLAN OF CARE
Problem: Stroke (Ischemic) (Adult)  Intervention: Monitor/Assist with Self Care   11/19/18 0900 11/19/18 2005   Activity   Activity Assistance Provided --  assistance, 1 person   Daily Care Interventions   Self-Care Promotion independence encouraged --    Functional Level Current   Ambulation 3 - assistive equipment and person --    Transferring 2 - assistive person --    Toileting 2 - assistive person --    Bathing 2 - assistive person --    Dressing 2 - assistive person --    Eating 0 - independent --    Communication 0 - understands/communicates without difficulty --    Swallowing 0 - swallows foods/liquids without difficulty --

## 2018-11-20 NOTE — ASSESSMENT & PLAN NOTE
- Admitted to [facility] for close monitoring initially  - Now stepped down to Vascular neurology service

## 2018-11-20 NOTE — PLAN OF CARE
Problem: Patient Care Overview  Goal: Plan of Care Review  Outcome: Ongoing (interventions implemented as appropriate)   11/19/18 7751   Coping/Psychosocial   Plan Of Care Reviewed With patient;mother     Patient to remain awake, alert and oriented x4. VSS. No apparent distress. Patient is able to verbalize pain relief and pain goals. Patient able to verbalize pain relief and pain goals. Denies any pain at this time. POC discussed with patient and family at bedside. Patient and family verbalize understanding of stroke education. No new neurological changes to report this shift. Fall precautions in place. Bed in lowest position with call light in reach. No other concerns at this time. Will continue to monitor.

## 2018-11-20 NOTE — PLAN OF CARE
Problem: Patient Care Overview  Goal: Plan of Care Review  Outcome: Ongoing (interventions implemented as appropriate)   11/20/18 1637   Coping/Psychosocial   Plan Of Care Reviewed With patient;mother   Patient to remain awake, alert and oriented x4. VSS. No apparent distress. Patient is able to verbalize pain relief and pain goals. Patient able to verbalize pain relief and pain goals. Denies any pain at this time. POC discussed with patient and family at bedside. Patient and family verbalize understanding of stroke education. No new neurological changes to report this shift. Fall precautions in place. Bed in lowest position with call light in reach. No other concerns at this time. Will continue to monitor.

## 2018-11-20 NOTE — PT/OT/SLP PROGRESS
"Speech Language Pathology Treatment    Patient Name:  Tyree Pablo   MRN:  [id number]  Admitting Diagnosis: Embolic stroke involving right middle cerebral artery    Recommendations:                 General Recommendations:  Dysphagia therapy and Cognitive-linguistic therapy  Diet recommendations:  Mechanical soft, Liquid Diet Level: Thin   Aspiration Precautions: 1 bite/sip at a time, Alternating bites/sips, Check for pocketing/oral residue, Eliminate distractions, Feed only when awake/alert, HOB to 90 degrees, Meds whole 1 at a time, Monitor for s/s of aspiration, Small bites/sips and Strict aspiration precautions   General Precautions: Standard, aspiration, fall, vision impaired  Communication strategies:  provide increased time to answer and go to room if call light pushed    Subjective     SLP reviewed Pt with nurse, nurse reported no difficulty with meals or medications, reviewed L inattention  Pt presents calm  He explains, "that felt a little different" (when attempting math tasks)  Patient goals: to get home    Pain/Comfort:  · Pain Rating 1: 0/10    Objective:     Has the patient been evaluated by SLP for swallowing?   Yes  Keep patient NPO? No   Current Respiratory Status: room air      Pt seen for dysphagia and cognitive-lingusitic therapies. Pt found awake and alert in bed, Mother at bedside. HOB elevated. Patient requested some water to drink. Pt seen with self-regulatd cup edge sips x4. NO overt S/S aspiration. He denied difficulty with meals. He completed fx math word problems for time management with and average of 60% accuracy I'ly and up to 90% provided minimal verbal and visual cues. He named an average of 9 items/minute in a concrete category across 2 attempts word-fluency tasks. He completed simple OMEs for labial/mandibular ROM and strength x5 each provided set-up. Pt and Mother educated on ongoing aspiration precautions, OMEs for HEP, and safety precautions for time/medication management. " Patient and mother verbalized understanding. Patient then asked for ice to ice down his orange juice on bedside table. Ice provided per Pt request. No additional questions noted. Whiteboard updated. Pt remained upright in bed with mother at bedside as SLP exited room.     Assessment:     Tyree Pablo is a 43 y.o. male with an SLP diagnosis of Dysphagia, Dysarthria, Cognitive-Linguistic Impairment and Visio-Spatial Impairment.  He presents with motivated and with ongoing progress with short term goals.  He would benefit from ongoing, intensive ST via ORF following d/c from acute to continue to improve speech, swallow and cognitive-linguistic skills for highest level of [place].     Goals:   Multidisciplinary Problems     SLP Goals        Problem: SLP Goal    Goal Priority Disciplines Outcome   SLP Goal     SLP Ongoing (interventions implemented as appropriate)   Description:  Speech Language Pathology Goals  Goals expected to be met by 11/26:  1. Patient will tolerate a dental soft diet and thin liquids with no overt signs of airway compromise.   2. Patient will complete OME's x10 each for improved lingual/labial strength and ROM.  3. Patient will demonstrate sustained attention to therapy tasks for 2 min with no cues.   4. Patient will provide 12+ items within concrete category in 1 minute indep.   5. Patient will complete mental manipulation tasks with 80% acc indep.   6. Patient will complete high level problem solving/reasoning tasks with 80% acc indep.     Goals expected to be met by 11/19  1. Patient will tolerate a dental soft diet and thin liquids with no overt signs of airway compromise.   2. Patient will complete OME's x10 each for improved lingual/labial strength and ROM.  3. Patient will demonstrate sustained attention to therapy tasks for 2 min with no cues.   4. Patient will provide 12+ items within concrete category in 1 minute indep.   5. Patient will complete mental manipulation tasks with 80%  acc indep.   6. Patient will complete high level problem solving/reasoning tasks with 80% acc indep.   7. Patient will complete visual spatial/tracking tasks to improve left neglect given mod A.   7. Patient will participate in further assessments regarding reading and writing.     Goals due 11/16  1.  Tolerate trials of [facility] soft diet with thin liquids with no s/s of aspiration  2.  Participate in speech language evaluation                      Plan:     · Patient to be seen:  4 x/week   · Plan of Care expires:  12/07/18  · Plan of Care reviewed with:  patient, mother   · SLP Follow-Up:  Yes       Discharge recommendations:  rehabilitation facility     Time Tracking:     SLP Treatment Date:   11/20/18  Speech Start Time:  1148  Speech Stop Time:  1212     Speech Total Time (min):  24 min    Billable Minutes: Speech Therapy Individual 16 and Treatment Swallowing Dysfunction 8     EMMA Dempsey, [facility]-SLP  Speech-Language Pathology  Pager: [phone]      11/20/2018

## 2018-11-20 NOTE — SUBJECTIVE & OBJECTIVE
Neurologic Chief Complaint: right MCA infarct    Subjective:     Interval History: Patient is seen for follow-up neurological assessment and treatment recommendations:MAKENZIE.  Waiting for placement.  More information given for substance abuse program and does not want to pursue treatment     HPI, Past Medical, Family, and Social History remains the same as documented in the initial encounter.     Review of Systems   Constitutional: Negative for chills and fever.   HENT: Negative for congestion and drooling.    Respiratory: Negative for cough and shortness of breath.    Gastrointestinal: Negative for diarrhea and vomiting.   Skin: Negative for pallor and rash.   Neurological: Positive for facial asymmetry and weakness.   Psychiatric/Behavioral: Negative for behavioral problems.     Scheduled Meds:   albuterol-ipratropium  3 mL Nebulization Q6H WAKE    amLODIPine  10 mg Oral Daily    aspirin  81 mg Oral Daily    atorvastatin  80 mg Oral Daily    FLUoxetine  20 mg Oral Daily    heparin (porcine)  5,000 Units Subcutaneous Q8H    hydrALAZINE  125 mg Oral Q8H     Continuous Infusions:  PRN Meds:acetaminophen, dextrose 50%, glucagon (human recombinant), hydrALAZINE, insulin aspart U-100, labetalol, sodium chloride 0.9%    Objective:     Vital Signs (Most Recent):  Temp: 98.1 °F (36.7 °C) (11/20/18 1641)  Pulse: 97 (11/20/18 1641)  Resp: 18 (11/20/18 1641)  BP: (!) 158/85 (11/20/18 1641)  SpO2: (!) 93 % (11/20/18 1641)  BP Location: Left arm    Vital Signs Range (Last 24H):  Temp:  [97.7 °F (36.5 °C)-98.4 °F (36.9 °C)]   Pulse:  [87-99]   Resp:  [16-20]   BP: (135-158)/(68-86)   SpO2:  [93 %-97 %]   BP Location: Left arm    Physical Exam   Constitutional: He appears well-developed and well-nourished.   HENT:   Head: Normocephalic and atraumatic.        Cardiovascular: Normal rate and regular rhythm.   Pulmonary/Chest: Effort normal. No respiratory distress.   Skin: Skin is warm and dry.   Vitals reviewed.        Neurological Exam:   LOC: alert  Attention Span: Good   Language: No aphasia  Articulation: mild dysarthria   Orientation: Person, Place, Time   Visual Fields: Hemianopsia left improving   EOM (CN III, IV, VI): R gaze preference  Pupils (CN II, III): PERRL  Motor: Arm left  Plegia 0/5  Leg left  Plegia: 0/5  Arm right  Normal 5/5  Leg right Normal 5/5  Sensation: intact  Tone:flaccid in left upper extremity     Laboratory:  CMP:   No results for input(s): GLUCOSE, CALCIUM, ALBUMIN, PROT, NA, K, CO2, CL, BUN, CREATININE, ALKPHOS, ALT, AST, BILITOT in the last 24 hours.  CBC:   Recent Labs   Lab 11/19/18  0600   WBC 6.40   RBC 5.06   HGB 13.3*   HCT 40.9      MCV 81*   MCH 26.3*   MCHC 32.5     Lipid Panel:   No results for input(s): CHOL, LDLCALC, HDL, TRIG in the last 168 hours.  Hgb A1C:   No results for input(s): HGBA1C in the last 168 hours.  TSH:   No results for input(s): TSH in the last 168 hours.    Diagnostic Results     Brain Imaging   CT head 11/13/18  Evolving right MCA infarct with 0.2cm MLS  Mass effect upon the right lateral ventricle   Petechial hemorrhage     MRI brain 11/09/18    Large right MCA infarct   Cytotoxic cerebral edema  Petechial hemorrhage inside lesion     Vessel Imaging   Cerebral angiogram 11/09/18  Thromboaspiration of the m1 segment of right middle cerebral artery with TICI 3 reperfusion.      CTA MP 11/09/18  Abrupt distal m1 occlusion.    Cardiac Imaging   Echo 11/10/18  · Left ventricle ejection fraction is normal at 65%  · Normal LV diastolic function.  · RV systolic function is normal.  · Trace tricuspid regurgitation.  · Normal central venous pressure (3 mm Hg).  · No pericardial effusion.

## 2018-11-20 NOTE — ASSESSMENT & PLAN NOTE
- Positive Utox screen for THC, amphetamine, opioids, benzodiazepines   - Likely the etiology of stroke   -patient does not want to go to substance abuse program

## 2018-11-20 NOTE — PLAN OF CARE
Problem: SLP Goal  Goal: SLP Goal  Speech Language Pathology Goals  Goals expected to be met by 11/26:  1. Patient will tolerate a dental soft diet and thin liquids with no overt signs of airway compromise.   2. Patient will complete OME's x10 each for improved lingual/labial strength and ROM.  3. Patient will demonstrate sustained attention to therapy tasks for 2 min with no cues.   4. Patient will provide 12+ items within concrete category in 1 minute indep.   5. Patient will complete mental manipulation tasks with 80% acc indep.   6. Patient will complete high level problem solving/reasoning tasks with 80% acc indep.     Goals expected to be met by 11/19  1. Patient will tolerate a dental soft diet and thin liquids with no overt signs of airway compromise.   2. Patient will complete OME's x10 each for improved lingual/labial strength and ROM.  3. Patient will demonstrate sustained attention to therapy tasks for 2 min with no cues.   4. Patient will provide 12+ items within concrete category in 1 minute indep.   5. Patient will complete mental manipulation tasks with 80% acc indep.   6. Patient will complete high level problem solving/reasoning tasks with 80% acc indep.   7. Patient will complete visual spatial/tracking tasks to improve left neglect given mod A.   7. Patient will participate in further assessments regarding reading and writing.     Goals due 11/16  1.  Tolerate trials of [facility] soft diet with thin liquids with no s/s of aspiration  2.  Participate in speech language evaluation     Outcome: Ongoing (interventions implemented as appropriate)  Pt progressing with goals. Pt required minimal verbal and visual cues for fx math tasks today.  ST to continue to follow.     SILVIANO Dempsey., [facility]-SLP  Speech-Language Pathology  Pager: [phone]  11/20/2018

## 2018-11-20 NOTE — PROGRESS NOTES
[facility]  Vascular Neurology  Comprehensive Stroke Center  Progress Note    Assessment/Plan:     * Embolic stroke involving right middle cerebral artery    43 y.o. male with significant past medical history of obesity, HTN, tobacco abuse who was transferred from [facility] after receiving IV-tPA for acute left sided weakness.  Found to have R M1 occlusion of the right middle cerebral artery and taken to interventional radiology for thrombectomy.  Patient had successful thromboaspiration with TICI 3 reperfusion       MRI brain showed large right middle cerebral artery territory infarct.  Mild petechial hemorrhage.  Etiology ESUS.  Utox for multiple drugs of abuse including THC with amphetamine.  Amphetamines can cause abnormal heart rhythms and could be the etiology of stroke.  Patient does have other risk factors such as family history of early stroke, obesity, tobacco abuse, hypertension.  Echo with bubble 11/12: negative for intracardiac shunt.   Patient needs 30 day event monitor     Antithrombotics: asa 81mg qd     Statins for secondary stroke prevention and hyperlipidemia, if present:   Statins: Atorvastatin- 80 mg daily     Aggressive risk factor modification: HTN, Smoking, Obesity, Drug use     Rehab efforts: PT/OT/SLP to evaluate and treat inpatient rehab     Diagnostics ordered/pending: hypercoagulable panel     VTE prophylaxis: SCDs. Heparin 5000 units q 8hours     BP parameters: Infarct: Post tPA, SBP <160    Patient needs thirty day event monitor after rehab          Substance abuse    - Positive Utox screen for THC, amphetamine, opioids, benzodiazepines   - Likely the etiology of stroke   -patient does not want to go to substance abuse program       Cytotoxic cerebral edema    - Area of cytotoxic cerebral edema identified when reviewing brain imaging in the territory of the right middle cerebral artery. There is no mass effect associated with it. We will  continue to monitor the patients clinical exam for any worsening of symptoms which may indicate expansion of the stroke or the area of the edema resulting in the clinical change. The pattern is suggestive of embolic etiology.             Essential hypertension    - Stroke risk factor.  SBP<160  - Continue Amlodipine 10mg and Hydralazine 125 mg TID  - BP well controlled since medication adjustments were made     Depression    Likely due to stroke  No SI, HI  Fluoxetine started       Left arm swelling    - Noted swelling and increased temperature of overlying skin  - WBC elevated this AM; leukocytosis now resolved  - US UE venous ordered to evaluate; no DVT noted  - Swelling improving, no further complaints      Angioedema due to angiotensin converting enzyme inhibitor (ACE-I)    - Angioedema on lips and tongue   - Improved with benadryl and steroids   - Recorded in patient's allergies        Mixed hyperlipidemia    - Risk factor for stroke  -    - Continue Atorvastatin 80mg QHS     Dysarthria    - Due to stroke   - SLP ordered and following      Tobacco abuse    - Stroke risk factor.  Encourage cessation.  - Nicotine patch PRN     S/P admn tPA in diff fac w/n last 24 hr bef adm to crnt fac    - Admitted to [facility] for close monitoring initially  - Now stepped down to Vascular neurology service     Acute left-sided weakness    - Sequelae of infarct  - PT, OT ordered; recommending rehab placement  - Continue aggressive daily therapy            11/12/18 Investigating etiologies of stroke.  Hypercoagulable and echo with bubble ordered. Patient utox + for amphetamines, THC, benzo, opiates.     11/13/18 NAEON patient neurologically stable.  Repeat CT head showing 0.2 cm midline shift and mass effect.  [facility] will continue to monitor cerebral edema.  11/14/18 Patient experiencing angioedema from lisinopril.  Will stay in in the ICU for intubation watch.  IV benadryl and solumedrol ordered   11/15/18 angioedema improved  with steroids and benadryl  11/16/18. Patient neurologically stable on exam with continued left sided weakness. -191 and HR  in last 24 hour. Discussed with Dr. Dunbar, continue amlodipine 10mg and increase hydralazine to 150mg TID. Plan to increase Lipitor to 80mg daily per Dr. Kapoor since LDL 14.  hypercoagulable panel overall negative, Lipoprotein A elevated at 134. Pending Rehab placement.   11/17/2018 Patient w/ new leukocytosis noted and increased temp and swelling in the LUE; US ordered to evaluate the vasculature. No other complaints at this time. Awaiting placement to Rehab. Starting Fluoxetine 10 mg QD  11/18/2018 No DVT on US. Patient doing well. Shoulder pain relieved w/ Tylenol PRN  11/19/18 Neurologically stable.  Awaiting placement to inpatient rehab. Patient is agreeable to go to substance abuse program after inpatient rehab.    11/20/18 NAEON.  Waiting for placement.  More information given for substance abuse program and does not want to pursue treatment       STROKE DOCUMENTATION   Acute Stroke Times   Last Known Normal Date: 11/08/18  Last Known Normal Time: 1930  Symptom Onset Date: 11/08/18  Symptom Onset Time: 2015  Stroke Team Called Date: 11/08/18  Stroke Team Called Time: 2212  Stroke Team Arrival Date: 11/08/18  Stroke Team Arrival Time: 2213  CT Interpretation Time: 2213(no acute findings)  Decision to Treat Time for Alteplase: 2228(tPA administered)  Decision to Treat Time for IR: 0347(no intervention at this time)    NIH Scale:  1a. Level Of Consciousness: 0-->Alert: keenly responsive  1b. LOC Questions: 0-->Answers both questions correctly  1c. LOC Commands: 0-->Performs both tasks correctly  2. Best Gaze: 0-->Normal  3. Visual: 1-->Partial hemianopia  4. Facial Palsy: 2-->Partial paralysis (total or near-total paralysis of lower face)  5a. Motor Arm, Left: 4-->No movement  5b. Motor Arm, Right: 0-->No drift: limb holds 90 (or 45) degrees for full 10 secs  6a. Motor Leg,  Left: 4-->No movement  6b. Motor Leg, Right: 0-->No drift: leg holds 30 degree position for full 5 secs  7. Limb Ataxia: 0-->Absent  8. Sensory: 0-->Normal: no sensory loss  9. Best Language: 0-->No aphasia: normal  10. Dysarthria: 1-->Mild-to-moderate dysarthria: patient slurs at least some words and, at worst, can be understood with some difficulty  11. Extinction and Inattention (formerly Neglect): 0-->No abnormality  Total (NIH Stroke Scale): 12       Modified Danilo Score: 0  Genny Coma Scale:    ABCD2 Score:    [id number] Score:   HAS -BLED Score:   ICH Score:   [employer] Classification:      Hemorrhagic change of an Ischemic Stroke: Does this patient have an ischemic stroke with hemorrhagic changes? No     Neurologic Chief Complaint: right MCA infarct    Subjective:     Interval History: Patient is seen for follow-up neurological assessment and treatment recommendations:MAKENZIE.  Waiting for placement.  More information given for substance abuse program and does not want to pursue treatment     HPI, Past Medical, Family, and Social History remains the same as documented in the initial encounter.     Review of Systems   Constitutional: Negative for chills and fever.   HENT: Negative for congestion and drooling.    Respiratory: Negative for cough and shortness of breath.    Gastrointestinal: Negative for diarrhea and vomiting.   Skin: Negative for pallor and rash.   Neurological: Positive for facial asymmetry and weakness.   Psychiatric/Behavioral: Negative for behavioral problems.     Scheduled Meds:   albuterol-ipratropium  3 mL Nebulization Q6H WAKE    amLODIPine  10 mg Oral Daily    aspirin  81 mg Oral Daily    atorvastatin  80 mg Oral Daily    FLUoxetine  20 mg Oral Daily    heparin (porcine)  5,000 Units Subcutaneous Q8H    hydrALAZINE  125 mg Oral Q8H     Continuous Infusions:  PRN Meds:acetaminophen, dextrose 50%, glucagon (human recombinant), hydrALAZINE, insulin aspart U-100, labetalol,  sodium chloride 0.9%    Objective:     Vital Signs (Most Recent):  Temp: 98.1 °F (36.7 °C) (11/20/18 1641)  Pulse: 97 (11/20/18 1641)  Resp: 18 (11/20/18 1641)  BP: (!) 158/85 (11/20/18 1641)  SpO2: (!) 93 % (11/20/18 1641)  BP Location: Left arm    Vital Signs Range (Last 24H):  Temp:  [97.7 °F (36.5 °C)-98.4 °F (36.9 °C)]   Pulse:  [87-99]   Resp:  [16-20]   BP: (135-158)/(68-86)   SpO2:  [93 %-97 %]   BP Location: Left arm    Physical Exam   Constitutional: He appears well-developed and well-nourished.   HENT:   Head: Normocephalic and atraumatic.        Cardiovascular: Normal rate and regular rhythm.   Pulmonary/Chest: Effort normal. No respiratory distress.   Skin: Skin is warm and dry.   Vitals reviewed.       Neurological Exam:   LOC: alert  Attention Span: Good   Language: No aphasia  Articulation: mild dysarthria   Orientation: Person, Place, Time   Visual Fields: Hemianopsia left improving   EOM (CN III, IV, VI): R gaze preference  Pupils (CN II, III): PERRL  Motor: Arm left  Plegia 0/5  Leg left  Plegia: 0/5  Arm right  Normal 5/5  Leg right Normal 5/5  Sensation: intact  Tone:flaccid in left upper extremity     Laboratory:  CMP:   No results for input(s): GLUCOSE, CALCIUM, ALBUMIN, PROT, NA, K, CO2, CL, BUN, CREATININE, ALKPHOS, ALT, AST, BILITOT in the last 24 hours.  CBC:   Recent Labs   Lab 11/19/18  0600   WBC 6.40   RBC 5.06   HGB 13.3*   HCT 40.9      MCV 81*   MCH 26.3*   MCHC 32.5     Lipid Panel:   No results for input(s): CHOL, LDLCALC, HDL, TRIG in the last 168 hours.  Hgb A1C:   No results for input(s): HGBA1C in the last 168 hours.  TSH:   No results for input(s): TSH in the last 168 hours.    Diagnostic Results     Brain Imaging   CT head 11/13/18  Evolving right MCA infarct with 0.2cm MLS  Mass effect upon the right lateral ventricle   Petechial hemorrhage     MRI brain 11/09/18    Large right MCA infarct   Cytotoxic cerebral edema  Petechial hemorrhage inside lesion     Vessel  Imaging   Cerebral angiogram 11/09/18  Thromboaspiration of the m1 segment of right middle cerebral artery with TICI 3 reperfusion.      CTA MP 11/09/18  Abrupt distal m1 occlusion.    Cardiac Imaging   Echo 11/10/18  · Left ventricle ejection fraction is normal at 65%  · Normal LV diastolic function.  · RV systolic function is normal.  · Trace tricuspid regurgitation.  · Normal central venous pressure (3 mm Hg).  · No pericardial effusion.             Fabiana Michael PA-C  Comprehensive Stroke Center  Department of Vascular Neurology   [facility]

## 2018-11-20 NOTE — PLAN OF CARE
Problem: Occupational Therapy Goal  Goal: Occupational Therapy Goal  Goals set 11/19 to be addressed for 7 days with expiration date, 11/26:  Patient will increase functional independence with ADLs by performing:    Patient will demonstrate rolling to the right with min assist.  Not met  Patient will demonstrate rolling to the left with modified independence.   Not met  Patient will demonstrate supine -sit with SBA.   Not met  Patient will demonstrate stand pivot transfers with min assist.   Not met  Patient will demonstrate grooming while standing with min assist.   Not met  Patient will demonstrate upper body dressing with min assist while seated EOB.   Not met  Patient will demonstrate lower body dressing with mod assist while seated EOB.   Not met  Patient will demonstrate toileting with mod assist.   Not met  Patient's family / caregiver will demonstrate independence and safety with assisting patient with self-care skills and functional mobility.     Not met  Patient and/or patient's family will verbalize understanding of stroke prevention guidelines, personal risk factors and stroke warning signs via teachback method.  Not met              Goals remain appropriate.  GABRIELLE Bradshaw  11/20/2018

## 2018-11-20 NOTE — PT/OT/SLP PROGRESS
"Occupational Therapy   Treatment    Name: Tyree Pablo  MRN: [id number]  Admitting Diagnosis:  Embolic stroke involving right middle cerebral artery       Recommendations:     Discharge Recommendations: rehabilitation facility  Discharge Equipment Recommendations:  3-in-1 commode, bath bench  Barriers to discharge:  Inaccessible home environment, Decreased caregiver support    Subjective   Patient:  "I am ready for rehab."  Pain/Comfort:  · Pain Rating 1: 0/10  · Pain Rating Post-Intervention 1: 0/10    Objective:   Communicated with: Nurse prior to session.  Patient found with all lines intact, call button in reach and bed alarm on and bed alarm, SCD, peripheral IV, telemetry upon OT entry to room.  Family present.  General Precautions: Standard, aspiration, fall, vision impaired   Orthopedic Precautions:N/A   Braces: N/A     Occupational Performance:    Bed Mobility:    · Patient completed Rolling/Turning to Left with  modified independence  · Patient completed Supine to Sit with minimum assistance  · Patient completed Sit to Supine with moderate assistance     Functional Mobility/Transfers:  Max assist with scooting along the EOB    Activities of Daily Living:  · Grooming: minimum assistance while seated with left UE in weight bearing  · Upper Body Dressing: moderate assistance while seated EOB     [facility] 6 Click ADL: 13    Treatment & Education:  Patient education provided for stroke warning signs, prevention guidelines and personal risk factors.  Patient verbalizing understanding via teach back method.   Patient education provided on role of OT and need for rehab upon discharge.  Patient education provided on hemiplegic dressing technique, left UE weight bearing / positioning, postural control, and transfers.  Continued education, patient/ family training recommended. Patient alert and oriented x 3; able to follow 4/4 one step commands.  Patient attentive and interactive throughout the session. Addressed " increasing attention to the left.  PROM performed left UE one set x 10 rep in all planes of motion with stretches provided at end range; sustained stretch provided for external rotation.  Assistance and facilitation provided for upward rotation of the scapula during shoulder flexion and abduction.  Addressed left UE weight bearing while seated EOB and during transitional movement patterns.  SBA with postural control while seated EOB with left UE in weight bearing.  Positioning provided for midline orientation with bilateral UEs elevated and heels lifted off mattress.  Patient's functional status and disposition recommendation discussed with patient and nurse.  White board updated in patient's room.  OT asked if there were any other questions; patient/ family had no further questions.    Patient left supine with all lines intact, call button in reach and bed alarm on  Education:    Assessment:     Tyree Palbo is a 43 y.o. male with a medical diagnosis of Embolic stroke involving right middle cerebral artery.  He presents with the following performance deficits affecting function are weakness, impaired endurance, impaired functional mobilty, gait instability, impaired cognition, decreased lower extremity function, decreased upper extremity function, decreased coordination, impaired balance, impaired self care skills, decreased safety awareness, impaired coordination, impaired fine motor.     Rehab Prognosis:  Good; patient would benefit from acute skilled OT services to address these deficits and reach maximum level of function.       Plan:     Patient to be seen 4 x/week to address the above listed problems via self-care/home management, neuromuscular re-education, cognitive retraining, therapeutic activities, therapeutic exercises, sensory integration  · Plan of Care Expires: 12/08/18  · Plan of Care Reviewed with: patient, mother    This Plan of care has been discussed with the patient who was involved in its  development and understands and is in agreement with the identified goals and treatment plan    GOALS:   Multidisciplinary Problems     Occupational Therapy Goals        Problem: Occupational Therapy Goal    Goal Priority Disciplines Outcome Interventions   Occupational Therapy Goal     OT, PT/OT Ongoing (interventions implemented as appropriate)    Description:  Goals set 11/19 to be addressed for 7 days with expiration date, 11/26:  Patient will increase functional independence with ADLs by performing:    Patient will demonstrate rolling to the right with min assist.  Not met  Patient will demonstrate rolling to the left with modified independence.   Not met  Patient will demonstrate supine -sit with SBA.   Not met  Patient will demonstrate stand pivot transfers with min assist.   Not met  Patient will demonstrate grooming while standing with min assist.   Not met  Patient will demonstrate upper body dressing with min assist while seated EOB.   Not met  Patient will demonstrate lower body dressing with mod assist while seated EOB.   Not met  Patient will demonstrate toileting with mod assist.   Not met  Patient's family / caregiver will demonstrate independence and safety with assisting patient with self-care skills and functional mobility.     Not met  Patient and/or patient's family will verbalize understanding of stroke prevention guidelines, personal risk factors and stroke warning signs via teachback method.  Not met                               Time Tracking:     OT Date of Treatment: 11/20/18  OT Start Time: 1315  OT Stop Time: 1349  OT Total Time (min): 34 min    Billable Minutes:Self Care/Home Management 34    GABRIELLE Bradshaw  11/20/2018

## 2018-11-21 PROBLEM — M79.89 LEFT ARM SWELLING: Status: RESOLVED | Noted: 2018-11-17 | Resolved: 2018-11-21

## 2018-11-21 PROCEDURE — 25000003 PHARM REV CODE 250: Performed by: PSYCHIATRY & NEUROLOGY

## 2018-11-21 PROCEDURE — 99238 HOSP IP/OBS DSCHRG MGMT 30/<: CPT | Mod: ,,, | Performed by: PSYCHIATRY & NEUROLOGY

## 2018-11-21 PROCEDURE — 25000003 PHARM REV CODE 250: Performed by: PHYSICIAN ASSISTANT

## 2018-11-21 PROCEDURE — 94640 AIRWAY INHALATION TREATMENT: CPT

## 2018-11-21 PROCEDURE — 25000003 PHARM REV CODE 250: Performed by: STUDENT IN AN ORGANIZED HEALTH CARE EDUCATION/TRAINING PROGRAM

## 2018-11-21 PROCEDURE — 92507 TX SP LANG VOICE COMM INDIV: CPT

## 2018-11-21 PROCEDURE — 63600175 PHARM REV CODE 636 W HCPCS: Performed by: PSYCHIATRY & NEUROLOGY

## 2018-11-21 PROCEDURE — 25000242 PHARM REV CODE 250 ALT 637 W/ HCPCS: Performed by: STUDENT IN AN ORGANIZED HEALTH CARE EDUCATION/TRAINING PROGRAM

## 2018-11-21 PROCEDURE — 20600001 HC STEP DOWN PRIVATE ROOM

## 2018-11-21 PROCEDURE — 99233 SBSQ HOSP IP/OBS HIGH 50: CPT | Mod: ,,, | Performed by: PSYCHIATRY & NEUROLOGY

## 2018-11-21 PROCEDURE — 97530 THERAPEUTIC ACTIVITIES: CPT

## 2018-11-21 PROCEDURE — 94761 N-INVAS EAR/PLS OXIMETRY MLT: CPT

## 2018-11-21 RX ORDER — NAPROXEN SODIUM 220 MG/1
81 TABLET, FILM COATED ORAL DAILY
Refills: 0
Start: 2018-11-22 | End: 2019-11-22

## 2018-11-21 RX ORDER — FLUOXETINE HYDROCHLORIDE 20 MG/1
20 CAPSULE ORAL DAILY
Qty: 30 CAPSULE | Refills: 0
Start: 2018-11-22 | End: 2019-11-22

## 2018-11-21 RX ORDER — HYDRALAZINE HYDROCHLORIDE 50 MG/1
100 TABLET, FILM COATED ORAL EVERY 8 HOURS
Status: DISCONTINUED | OUTPATIENT
Start: 2018-11-21 | End: 2018-11-22 | Stop reason: HOSPADM

## 2018-11-21 RX ORDER — HYDRALAZINE HYDROCHLORIDE 100 MG/1
100 TABLET, FILM COATED ORAL EVERY 8 HOURS
Qty: 90 TABLET | Refills: 0
Start: 2018-11-21 | End: 2019-11-21

## 2018-11-21 RX ORDER — ATORVASTATIN CALCIUM 80 MG/1
80 TABLET, FILM COATED ORAL DAILY
Qty: 90 TABLET | Refills: 0
Start: 2018-11-22 | End: 2019-11-22

## 2018-11-21 RX ORDER — IPRATROPIUM BROMIDE AND ALBUTEROL SULFATE 2.5; .5 MG/3ML; MG/3ML
3 SOLUTION RESPIRATORY (INHALATION)
Qty: 1 BOX | Refills: 0 | Status: SHIPPED | OUTPATIENT
Start: 2018-11-21 | End: 2018-11-22

## 2018-11-21 RX ADMIN — HEPARIN SODIUM 5000 UNITS: 5000 INJECTION, SOLUTION INTRAVENOUS; SUBCUTANEOUS at 05:11

## 2018-11-21 RX ADMIN — IPRATROPIUM BROMIDE AND ALBUTEROL SULFATE 3 ML: .5; 3 SOLUTION RESPIRATORY (INHALATION) at 07:11

## 2018-11-21 RX ADMIN — HYDRALAZINE HYDROCHLORIDE 100 MG: 50 TABLET ORAL at 02:11

## 2018-11-21 RX ADMIN — ATORVASTATIN CALCIUM 80 MG: 20 TABLET, FILM COATED ORAL at 09:11

## 2018-11-21 RX ADMIN — HEPARIN SODIUM 5000 UNITS: 5000 INJECTION, SOLUTION INTRAVENOUS; SUBCUTANEOUS at 09:11

## 2018-11-21 RX ADMIN — HEPARIN SODIUM 5000 UNITS: 5000 INJECTION, SOLUTION INTRAVENOUS; SUBCUTANEOUS at 02:11

## 2018-11-21 RX ADMIN — FLUOXETINE 20 MG: 20 CAPSULE ORAL at 09:11

## 2018-11-21 RX ADMIN — ASPIRIN 81 MG CHEWABLE TABLET 81 MG: 81 TABLET CHEWABLE at 09:11

## 2018-11-21 RX ADMIN — HYDRALAZINE HYDROCHLORIDE 100 MG: 50 TABLET ORAL at 09:11

## 2018-11-21 RX ADMIN — IPRATROPIUM BROMIDE AND ALBUTEROL SULFATE 3 ML: .5; 3 SOLUTION RESPIRATORY (INHALATION) at 11:11

## 2018-11-21 RX ADMIN — HYDRALAZINE HYDROCHLORIDE 125 MG: 50 TABLET ORAL at 05:11

## 2018-11-21 RX ADMIN — AMLODIPINE BESYLATE 10 MG: 10 TABLET ORAL at 09:11
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
## 2018-11-21 NOTE — PT/OT/SLP PROGRESS
"Physical Therapy Treatment    Patient Name:  Tyree Pablo   MRN:  [id number]  Admitting Diagnosis: Embolic stroke involving right middle cerebral artery  Recent Surgery: * No surgery found *      Recommendations:     Discharge Recommendations:  rehabilitation facility   Discharge Equipment Recommendations: (TBD)   Barriers to discharge: Decreased caregiver support  Pt requiring increased assistance at current time.     Plan:     During this hospitalization, patient to be seen 5 x/week to address the above listed problems via gait training, therapeutic activities, therapeutic exercises, neuromuscular re-education  · Plan of Care Expires:  12/10/18   Plan of Care Reviewed with: patient, mother    This Plan of care has been discussed with the patient who was involved in its development and understands and is in agreement with the identified goals and treatment plan    Subjective     Communicated with RN (Ella) prior to session.     Patient comments: "My R leg is getting tired" while standing  Pain/Comfort:  · Pain Rating 1: 0/10  · Pain Rating Post-Intervention 1: 0/10    Objective:     Patient found with: bed alarm, pressure relief boots, telemetry    Patient found sup in bed upon PT entry to room, agreeable to treatment.  Mother present in the room.    General Precautions: Standard, Cardiac aspiration, fall, vision impaired   Orthopedic Precautions:N/A   Braces: N/A       BED MOBILITY (vc's for hand placement sequencing of task):        Rolling to the R with max A with no use of bedrail       Sup > sit at the EOB with mod A from R side lying        Sit > sup with max A       Scooting hips to the EOB upon sitting with mod A x2 scoots       Scooting hips along the EOB to the R requiring mod/max A x2-3 scoots       Pt performs scooting laterally to the L via bridging with mod A x2-3 scoot(s)            SITTING AT THE EDGE OF THE BED    Assistance Level Required: min A for trunk control and for L UE support      Postural " deviations noted: flexed trunk, rounded shoulders, PPT, decreased attention to the L   Encouraged: midline orientation, attention the L, symmetrical weight bearing through B UE                Pt performs reaching with R UE with mod/min A for L UE and trunk        TRANSFERS  (vc's for hand placement, sequencing of task and safety)   Patient completed Sit <> Stand Transfer from EOB with max A of 2 (rehab for L UE only) for hip elevation, hip ext, L knee ext, L UE support3 and balance with no assistive device x3 trials   Patient completed Stand <> Sit Transfer to EOB with max  A for controlled descent with no assistive device       STANDING (2-3 min)x3 trials       Pt tolerates standing with no AD requiring max A of 2 (rehab tech for L UE support) for hip ext, L knee ext, balance, trunk ext and support to L UE with vc's.  Pt demonstrates hip flexed, L lateral lean, decreased attention to the L, resistance with R LE, decreased ankle ROM    EDUCATION  Patient provided with daily orientation and goals of this PT session.  They were provided and educated on proper positioning in supine and in sitting with support of affected L UE in order to increase awareness of it and to decrease the effects of immobility, specifically edema and pain.     They were educated to call for assistance and to transfer with hospital staff only.  Also, pt was educated on the effects of prolonged immobility and the importance of performing OOB activity to promote healing and reduce recovery time    Whiteboard updated with correct mobility information. RN/PCT notified.  Pt appropriate to transfer OOB with therapy ONLY at this time.     Patient left supine, with L UE propped on pillow for scapular support and edema management with all lines intact, call button in reach, bed alarm on, RN notified and mother present    AM-PAC 6 CLICK MOBILITY  Turning over in bed (including adjusting bedclothes, sheets and blankets)?: 2  Sitting down on and standing  up from a chair with arms (e.g., wheelchair, bedside commode, etc.): 2  Moving from lying on back to sitting on the side of the bed?: 2  Moving to and from a bed to a chair (including a wheelchair)?: 1  Need to walk in hospital room?: 1  Climbing 3-5 steps with a railing?: 1  Basic Mobility Total Score: 9     Assessment:     Tyree Pablo is a 43 y.o. male admitted with a medical diagnosis of Embolic stroke involving right middle cerebral artery.  He presents with the following impairments/functional limitations:  weakness, impaired endurance, impaired sensation, impaired self care skills, impaired functional mobilty, impaired balance, visual deficits, impaired cognition, decreased coordination, decreased upper extremity function, decreased lower extremity function, decreased safety awareness, abnormal tone, decreased ROM, impaired coordination, impaired fine motor. L hemiparesis requiring significant assistance and verbal cues for bed mob, scooting in bed, scooting to/along the EOB, sitting at the EOB, sit < > stand and standing 2* weakness, decreased L UE and LE control, decreased R ankle ROM (fused), decreased attention to the L and cognitive deficits.   In light of pt's current functional level and deficits, it is anticipated that pt will need to participate in an intense rehab program consisting of PT, OT and ST in order to achieve full rehab potential to return to previous level of function and roles.  Pt remains motivated to participate in PT session and will cont to benefit from skilled PT intervention.    Rehab Prognosis:  Good; patient would benefit from acute skilled PT services to address these deficits and reach maximum level of function.      GOALS:   Multidisciplinary Problems     Physical Therapy Goals        Problem: Physical Therapy Goal    Goal Priority Disciplines Outcome Goal Variances Interventions   Physical Therapy Goal     PT, PT/OT Ongoing (interventions implemented as appropriate)      Description:  Goals to be met by: 2018     Patient will increase functional independence with mobility by performin. Supine to sit with MInimal Assistance  2. Sit to supine with MInimal Assistance  3. Sit to stand transfer with Minimal Assistance  4. Bed to chair transfer with Minimal Assistance  5. Gait  x 50 feet with Moderate Assistance with or without appropriate AD.   6. Sitting at edge of bed x10 minutes with Contact Guard Assistance  7. Lower extremity exercise program x15 reps per handout, with assistance as needed                      Time Tracking:     PT Received On: 18  PT Start Time: 1326     PT Stop Time: 1406  PT Total Time (min): 40 min     Billable Minutes: Therapeutic Activity 40    Treatment Type: Treatment  PT/PTA: PTA     PTA Visit Number: 1       Agueda Lomax PTA.  Pager [phone]    2018    .

## 2018-11-21 NOTE — PLAN OF CARE
Notified by Bell at [facility] (cell ph [phone]) that insurance auth has been received. She states that they can accept patient tomorrow even though it is a holiday. Notified team.

## 2018-11-21 NOTE — PLAN OF CARE
Problem: Physical Therapy Goal  Goal: Physical Therapy Goal  Goals to be met by: 2018     Patient will increase functional independence with mobility by performin. Supine to sit with MInimal Assistance  2. Sit to supine with MInimal Assistance  3. Sit to stand transfer with Minimal Assistance  4. Bed to chair transfer with Minimal Assistance  5. Gait  x 50 feet with Moderate Assistance with or without appropriate AD.   6. Sitting at edge of bed x10 minutes with Contact Guard Assistance  7. Lower extremity exercise program x15 reps per handout, with assistance as needed       Discharge Recommendations: Rehab    Pt appropriate to transfer OOB with therapy ONLY at this time.     Goals remain appropriate.     Agueda Lomax, PTA.   [phone]   2018

## 2018-11-21 NOTE — PROGRESS NOTES
[facility]  Vascular Neurology  Comprehensive Stroke Center  Progress Note    Assessment/Plan:     * Embolic stroke involving right middle cerebral artery    43 y.o. male with significant past medical history of obesity, HTN, tobacco abuse who was transferred from [facility] after receiving IV-tPA for acute left sided weakness.  Found to have R M1 occlusion of the right middle cerebral artery and taken to interventional radiology for thrombectomy.  Patient had successful thromboaspiration with TICI 3 reperfusion       MRI brain showed large right middle cerebral artery territory infarct.  Mild petechial hemorrhage.  Etiology ESUS.  Utox for multiple drugs of abuse including THC with amphetamine.  Amphetamines can cause abnormal heart rhythms and could be the etiology of stroke.  Patient does have other risk factors such as family history of early stroke, obesity, tobacco abuse, hypertension.  Echo with bubble 11/12: negative for intracardiac shunt.   Patient needs 30 day event monitor     Antithrombotics: asa 81mg qd     Statins for secondary stroke prevention and hyperlipidemia, if present:   Statins: Atorvastatin- 80 mg daily     Aggressive risk factor modification: HTN, Smoking, Obesity, Drug use     Rehab efforts: PT/OT/SLP to evaluate and treat inpatient rehab     Diagnostics ordered/pending: hypercoagulable panel     VTE prophylaxis: SCDs. Heparin 5000 units q 8hours     BP parameters: Infarct: Post tPA, SBP <160    Patient needs thirty day event monitor after rehab          Substance abuse    - Positive Utox screen for THC, amphetamine, opioids, benzodiazepines   - Likely the etiology of stroke   -patient does not want to go to substance abuse program       Cytotoxic cerebral edema    - Area of cytotoxic cerebral edema identified when reviewing brain imaging in the territory of the right middle cerebral artery. There is no mass effect associated with it. We will  continue to monitor the patients clinical exam for any worsening of symptoms which may indicate expansion of the stroke or the area of the edema resulting in the clinical change. The pattern is suggestive of embolic etiology.             Essential hypertension    - Stroke risk factor.  SBP<160  - Continue Amlodipine 10mg and Hydralazine 125 mg TID  - BP well controlled since medication adjustments were made     Depression    Likely due to stroke  No SI, HI  Fluoxetine started       Left arm swelling    - Noted swelling and increased temperature of overlying skin  - WBC elevated this AM; leukocytosis now resolved  - US UE venous ordered to evaluate; no DVT noted  - Swelling improving, no further complaints      Angioedema due to angiotensin converting enzyme inhibitor (ACE-I)    - Angioedema on lips and tongue   - Improved with benadryl and steroids   - Recorded in patient's allergies        Mixed hyperlipidemia    - Risk factor for stroke  -    - Continue Atorvastatin 80mg QHS     Dysarthria    - Due to stroke   - SLP ordered and following      Tobacco abuse    - Stroke risk factor.  Encourage cessation.  - Nicotine patch PRN     S/P admn tPA in diff fac w/n last 24 hr bef adm to crnt fac    - Admitted to [facility] for close monitoring initially  - Now stepped down to Vascular neurology service     Acute left-sided weakness    - Sequelae of infarct  - PT, OT ordered; recommending rehab placement  - Continue aggressive daily therapy            43 y.o. male with significant past medical history of obesity, HTN, tobacco abuse who was transferred from [facility] after receiving IV-tPA for acute left sided weakness.  Found to have R M1 occlusion of the right middle cerebral artery and taken to interventional radiology for thrombectomy.  Patient had successful thromboaspiration with TICI 3 reperfusion        MRI brain showed large right middle cerebral artery territory infarct.  Mild petechial  hemorrhage.  Etiology ESUS.  Utox for multiple drugs of abuse including THC with amphetamine.  Amphetamines can cause abnormal heart rhythms and could be the etiology of stroke.  Patient does have other risk factors such as family history of early stroke, obesity, tobacco abuse, hypertension.  Echo with bubble 11/12: negative for intracardiac shunt.  He was started on ASA 81mg qd and atorvastatin 80mg qhs for secondary stroke prevention. Patient needs 30 day event monitor.  He will follow up with PCP within 10 days of hospital discharge and in 4-6 weeks stroke clinic.      11/12/18 Investigating etiologies of stroke.  Hypercoagulable and echo with bubble ordered. Patient utox + for amphetamines, THC, benzo, opiates.     11/13/18 NAEON patient neurologically stable.  Repeat CT head showing 0.2 cm midline shift and mass effect.  NCC will continue to monitor cerebral edema.  11/14/18 Patient experiencing angioedema from lisinopril.  Will stay in in the ICU for intubation watch.  IV benadryl and solumedrol ordered   11/15/18 angioedema improved with steroids and benadryl  11/16/18. Patient neurologically stable on exam with continued left sided weakness. -191 and HR  in last 24 hour. Discussed with Dr. Dunbar, continue amlodipine 10mg and increase hydralazine to 150mg TID. Plan to increase Lipitor to 80mg daily per Dr. Kapoor since LDL 14.  hypercoagulable panel overall negative, Lipoprotein A elevated at 134. Pending Rehab placement.   11/17/2018 Patient w/ new leukocytosis noted and increased temp and swelling in the LUE; US ordered to evaluate the vasculature. No other complaints at this time. Awaiting placement to Rehab. Starting Fluoxetine 10 mg QD  11/18/2018 No DVT on US. Patient doing well. Shoulder pain relieved w/ Tylenol PRN  11/19/18 Neurologically stable.  Awaiting placement to inpatient rehab. Patient is agreeable to go to substance abuse program after inpatient rehab.    11/20/18 MAKENZIE.   Waiting for placement.  More information given for substance abuse program and does not want to pursue treatment       STROKE DOCUMENTATION   Acute Stroke Times   Last Known Normal Date: 11/08/18  Last Known Normal Time: 1930  Symptom Onset Date: 11/08/18  Symptom Onset Time: 2015  Stroke Team Called Date: 11/08/18  Stroke Team Called Time: 2212  Stroke Team Arrival Date: 11/08/18  Stroke Team Arrival Time: 2213  CT Interpretation Time: 2213(no acute findings)  Decision to Treat Time for Alteplase: 2228(tPA administered)  Decision to Treat Time for IR: 0347(no intervention at this time)    NIH Scale:  1a. Level Of Consciousness: 0-->Alert: keenly responsive  1b. LOC Questions: 0-->Answers both questions correctly  1c. LOC Commands: 0-->Performs both tasks correctly  2. Best Gaze: 0-->Normal  3. Visual: 1-->Partial hemianopia  4. Facial Palsy: 2-->Partial paralysis (total or near-total paralysis of lower face)  5a. Motor Arm, Left: 4-->No movement  5b. Motor Arm, Right: 0-->No drift: limb holds 90 (or 45) degrees for full 10 secs  6a. Motor Leg, Left: 4-->No movement  6b. Motor Leg, Right: 0-->No drift: leg holds 30 degree position for full 5 secs  7. Limb Ataxia: 0-->Absent  8. Sensory: 0-->Normal: no sensory loss  9. Best Language: 0-->No aphasia: normal  10. Dysarthria: 1-->Mild-to-moderate dysarthria: patient slurs at least some words and, at worst, can be understood with some difficulty  11. Extinction and Inattention (formerly Neglect): 0-->No abnormality  Total (NIH Stroke Scale): 12       Modified [place] Score: 0  Genny Coma Scale:    ABCD2 Score:    [id number] Score:   HAS -BLED Score:   ICH Score:   [employer] Classification:      Hemorrhagic change of an Ischemic Stroke: Does this patient have an ischemic stroke with hemorrhagic changes? No     Neurologic Chief Complaint: right MCA infarct    Subjective:     Interval History: Patient is seen for follow-up neurological assessment and treatment  recommendations:ROMA chavez     HPI, Past Medical, Family, and Social History remains the same as documented in the initial encounter.     Review of Systems   Constitutional: Negative for chills and fever.   HENT: Negative for congestion and drooling.    Respiratory: Negative for cough and shortness of breath.    Gastrointestinal: Negative for diarrhea and vomiting.   Skin: Negative for pallor and rash.   Neurological: Positive for facial asymmetry and weakness.   Psychiatric/Behavioral: Negative for agitation and behavioral problems.        + depression  Denies SI, HI      Scheduled Meds:   albuterol-ipratropium  3 mL Nebulization Q6H WAKE    amLODIPine  10 mg Oral Daily    aspirin  81 mg Oral Daily    atorvastatin  80 mg Oral Daily    FLUoxetine  20 mg Oral Daily    heparin (porcine)  5,000 Units Subcutaneous Q8H    hydrALAZINE  100 mg Oral Q8H     Continuous Infusions:  PRN Meds:acetaminophen, dextrose 50%, glucagon (human recombinant), hydrALAZINE, insulin aspart U-100, labetalol, sodium chloride 0.9%    Objective:     Vital Signs (Most Recent):  Temp: 98.3 °F (36.8 °C) (11/21/18 1605)  Pulse: 96 (11/21/18 1605)  Resp: 18 (11/21/18 1605)  BP: (!) 154/81 (11/21/18 1605)  SpO2: 95 % (11/21/18 1605)  BP Location: Left arm    Vital Signs Range (Last 24H):  Temp:  [97.6 °F (36.4 °C)-99 °F (37.2 °C)]   Pulse:  [70-97]   Resp:  [16-20]   BP: (129-154)/(78-89)   SpO2:  [90 %-100 %]   BP Location: Left arm    Physical Exam   Constitutional: He appears well-developed and well-nourished.   HENT:   Head: Normocephalic and atraumatic.        Cardiovascular: Normal rate and regular rhythm.   Pulmonary/Chest: Effort normal. No respiratory distress.   Skin: Skin is warm and dry.   Vitals reviewed.       Neurological Exam:   LOC: alert  Attention Span: Good   Language: No aphasia  Articulation: mild dysarthria   Orientation: Person, Place, Time   Visual Fields: Hemianopsia left improving   EOM (CN III, IV, VI): R gaze  preference  Pupils (CN II, III): PERRL  Motor: Arm left  Plegia 0/5  Leg left  Plegia: 0/5  Arm right  Normal 5/5  Leg right Normal 5/5  Sensation: intact  Tone:flaccid in left upper extremity     Laboratory:  CMP:   No results for input(s): GLUCOSE, CALCIUM, ALBUMIN, PROT, NA, K, CO2, CL, BUN, CREATININE, ALKPHOS, ALT, AST, BILITOT in the last 24 hours.  CBC:   Recent Labs   Lab 11/19/18  0600   WBC 6.40   RBC 5.06   HGB 13.3*   HCT 40.9      MCV 81*   MCH 26.3*   MCHC 32.5     Lipid Panel:   No results for input(s): CHOL, LDLCALC, HDL, TRIG in the last 168 hours.  Hgb A1C:   No results for input(s): HGBA1C in the last 168 hours.  TSH:   No results for input(s): TSH in the last 168 hours.    Diagnostic Results     Brain Imaging   CT head 11/13/18  Evolving right MCA infarct with 0.2cm MLS  Mass effect upon the right lateral ventricle   Petechial hemorrhage     MRI brain 11/09/18    Large right MCA infarct   Cytotoxic cerebral edema  Petechial hemorrhage inside lesion     Vessel Imaging   Cerebral angiogram 11/09/18  Thromboaspiration of the m1 segment of right middle cerebral artery with TICI 3 reperfusion.      CTA MP 11/09/18  Abrupt distal m1 occlusion.    Cardiac Imaging   Echo 11/10/18  · Left ventricle ejection fraction is normal at 65%  · Normal LV diastolic function.  · RV systolic function is normal.  · Trace tricuspid regurgitation.  · Normal central venous pressure (3 mm Hg).  · No pericardial effusion.             Fabiana Michael PA-C  Comprehensive Stroke Center  Department of Vascular Neurology   [facility]
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
## 2018-11-21 NOTE — ASSESSMENT & PLAN NOTE
- Admitted to [facility] for close monitoring initially  - Now stepped down to Vascular neurology service

## 2018-11-21 NOTE — PT/OT/SLP PROGRESS
"Speech Language Pathology Treatment    Patient Name:  Tyree Pablo   MRN:  [id number]  Admitting Diagnosis: Embolic stroke involving right middle cerebral artery    Recommendations:                 General Recommendations:  Cognitive-linguistic therapy  Diet recommendations:  Mechanical soft, Liquid Diet Level: Thin   Aspiration Precautions: Standard aspiration precautions   General Precautions: Standard, aspiration, fall, vision impaired  Communication strategies:  none    Subjective     "you are starting to confuse me"  Per pt   Patient goals: rehab  Pain/Comfort:  · Pain Rating 1: 0/10  · Pain Rating Post-Intervention 1: 0/10    Objective:     Has the patient been evaluated by SLP for swallowing?   Yes  Keep patient NPO? No   Current Respiratory Status: room air      Pt. Seen at bedside with improved attention to left side noted.  He accurately read clock on wall slightly to left of bed.  Throughout session, he required min cues to attend to left to maintain eye contact.  Pt. Verbalized limited insight to deficits stating that his cognition was "fine.".  Mr. Pablo was oriented to month, year, beverly.  He responded to category exclusion tasks in field of 4 with 80% accuracy with mild delays in responding noted and min cues.  Pt. Responded to mental manipulation tasks with 70% accuracy with delays in responding noted and responded to functional math and time calculations with 80% accuracy with min cues.       Assessment:     Tyree Pablo is a 43 y.o. male with an SLP diagnosis of Cognitive-Linguistic Impairment and Visio-Spatial Impairment.    Goals:   Multidisciplinary Problems     SLP Goals        Problem: SLP Goal    Goal Priority Disciplines Outcome   SLP Goal     SLP Ongoing (interventions implemented as appropriate)   Description:  Speech Language Pathology Goals  Goals expected to be met by 11/26:  1. Patient will tolerate a dental soft diet and thin liquids with no overt signs of airway compromise.   2. " Patient will complete OME's x10 each for improved lingual/labial strength and ROM.  3. Patient will demonstrate sustained attention to therapy tasks for 2 min with no cues.   4. Patient will provide 12+ items within concrete category in 1 minute indep.   5. Patient will complete mental manipulation tasks with 80% acc indep.   6. Patient will complete high level problem solving/reasoning tasks with 80% acc indep.     Goals expected to be met by 11/19  1. Patient will tolerate a dental soft diet and thin liquids with no overt signs of airway compromise.   2. Patient will complete OME's x10 each for improved lingual/labial strength and ROM.  3. Patient will demonstrate sustained attention to therapy tasks for 2 min with no cues.   4. Patient will provide 12+ items within concrete category in 1 minute indep.   5. Patient will complete mental manipulation tasks with 80% acc indep.   6. Patient will complete high level problem solving/reasoning tasks with 80% acc indep.   7. Patient will complete visual spatial/tracking tasks to improve left neglect given mod A.   7. Patient will participate in further assessments regarding reading and writing.     Goals due 11/16  1.  [facility] with thin liquids with no s/s of aspiration  2.  Participate in speech language evaluation                      Plan:     · Patient to be seen:  4 x/week   · Plan of Care expires:  12/07/18  · Plan of Care reviewed with:  patient, mother   · SLP Follow-Up:  Yes       Discharge recommendations:  rehabilitation facility       Time Tracking:     SLP Treatment Date:   11/21/18  Speech Start Time:  1235  Speech Stop Time:  1300     Speech Total Time (min):  25 min    Billable Minutes: Speech Therapy Individual 25    Estefanía Yanez MA, CCC-SLP  11/21/2018

## 2018-11-21 NOTE — ASSESSMENT & PLAN NOTE
43 y.o. male with significant past medical history of obesity, HTN, tobacco abuse who was transferred from [facility] after receiving IV-tPA for acute left sided weakness.  Found to have R M1 occlusion of the right middle cerebral artery and taken to interventional radiology for thrombectomy.  Patient had successful thromboaspiration with TICI 3 reperfusion       MRI brain showed large right middle cerebral artery territory infarct.  Mild petechial hemorrhage.  Etiology ESUS.  Utox for multiple drugs of abuse including THC with amphetamine.  Amphetamines can cause abnormal heart rhythms and could be the etiology of stroke.  Patient does have other risk factors such as family history of early stroke, obesity, tobacco abuse, hypertension.  Echo with bubble 11/12: negative for intracardiac shunt.   Patient needs 30 day event monitor     Antithrombotics: asa 81mg qd     Statins for secondary stroke prevention and hyperlipidemia, if present:   Statins: Atorvastatin- 80 mg daily     Aggressive risk factor modification: HTN, Smoking, Obesity, Drug use     Rehab efforts: PT/OT/SLP to evaluate and treat inpatient rehab     Diagnostics ordered/pending: hypercoagulable panel     VTE prophylaxis: SCDs. Heparin 5000 units q 8hours     BP parameters: Infarct: Post tPA, SBP <160    Patient needs thirty day event monitor after rehab

## 2018-11-21 NOTE — PLAN OF CARE
Problem: SLP Goal  Goal: SLP Goal  Speech Language Pathology Goals  Goals expected to be met by 11/26:  1. Patient will tolerate a dental soft diet and thin liquids with no overt signs of airway compromise.   2. Patient will complete OME's x10 each for improved lingual/labial strength and ROM.  3. Patient will demonstrate sustained attention to therapy tasks for 2 min with no cues.   4. Patient will provide 12+ items within concrete category in 1 minute indep.   5. Patient will complete mental manipulation tasks with 80% acc indep.   6. Patient will complete high level problem solving/reasoning tasks with 80% acc indep.     Goals expected to be met by 11/19  1. Patient will tolerate a dental soft diet and thin liquids with no overt signs of airway compromise.   2. Patient will complete OME's x10 each for improved lingual/labial strength and ROM.  3. Patient will demonstrate sustained attention to therapy tasks for 2 min with no cues.   4. Patient will provide 12+ items within concrete category in 1 minute indep.   5. Patient will complete mental manipulation tasks with 80% acc indep.   6. Patient will complete high level problem solving/reasoning tasks with 80% acc indep.   7. Patient will complete visual spatial/tracking tasks to improve left neglect given mod A.   7. Patient will participate in further assessments regarding reading and writing.     Goals due 11/16  1.  Tolerate trials of [facility] soft diet with thin liquids with no s/s of aspiration  2.  Participate in speech language evaluation     Outcome: Ongoing (interventions implemented as appropriate)  Goals remain appropriate.    Estefanía Yanez MA/KELIN-SLP  Speech Language Pathologist  Pager [phone]  11/21/2018

## 2018-11-21 NOTE — SUBJECTIVE & OBJECTIVE
Neurologic Chief Complaint: right MCA infarct    Subjective:     Interval History: Patient is seen for follow-up neurological assessment and treatment recommendations:ROMA chavez     HPI, Past Medical, Family, and Social History remains the same as documented in the initial encounter.     Review of Systems   Constitutional: Negative for chills and fever.   HENT: Negative for congestion and drooling.    Respiratory: Negative for cough and shortness of breath.    Gastrointestinal: Negative for diarrhea and vomiting.   Skin: Negative for pallor and rash.   Neurological: Positive for facial asymmetry and weakness.   Psychiatric/Behavioral: Negative for agitation and behavioral problems.        + depression  Denies SI, HI      Scheduled Meds:   albuterol-ipratropium  3 mL Nebulization Q6H WAKE    amLODIPine  10 mg Oral Daily    aspirin  81 mg Oral Daily    atorvastatin  80 mg Oral Daily    FLUoxetine  20 mg Oral Daily    heparin (porcine)  5,000 Units Subcutaneous Q8H    hydrALAZINE  100 mg Oral Q8H     Continuous Infusions:  PRN Meds:acetaminophen, dextrose 50%, glucagon (human recombinant), hydrALAZINE, insulin aspart U-100, labetalol, sodium chloride 0.9%    Objective:     Vital Signs (Most Recent):  Temp: 98.3 °F (36.8 °C) (11/21/18 1605)  Pulse: 96 (11/21/18 1605)  Resp: 18 (11/21/18 1605)  BP: (!) 154/81 (11/21/18 1605)  SpO2: 95 % (11/21/18 1605)  BP Location: Left arm    Vital Signs Range (Last 24H):  Temp:  [97.6 °F (36.4 °C)-99 °F (37.2 °C)]   Pulse:  [70-97]   Resp:  [16-20]   BP: (129-154)/(78-89)   SpO2:  [90 %-100 %]   BP Location: Left arm    Physical Exam   Constitutional: He appears well-developed and well-nourished.   HENT:   Head: Normocephalic and atraumatic.        Cardiovascular: Normal rate and regular rhythm.   Pulmonary/Chest: Effort normal. No respiratory distress.   Skin: Skin is warm and dry.   Vitals reviewed.       Neurological Exam:   LOC: alert  Attention Span: Good   Language: No  aphasia  Articulation: mild dysarthria   Orientation: Person, Place, Time   Visual Fields: Hemianopsia left improving   EOM (CN III, IV, VI): R gaze preference  Pupils (CN II, III): PERRL  Motor: Arm left  Plegia 0/5  Leg left  Plegia: 0/5  Arm right  Normal 5/5  Leg right Normal 5/5  Sensation: intact  Tone:flaccid in left upper extremity     Laboratory:  CMP:   No results for input(s): GLUCOSE, CALCIUM, ALBUMIN, PROT, NA, K, CO2, CL, BUN, CREATININE, ALKPHOS, ALT, AST, BILITOT in the last 24 hours.  CBC:   Recent Labs   Lab 11/19/18  0600   WBC 6.40   RBC 5.06   HGB 13.3*   HCT 40.9      MCV 81*   MCH 26.3*   MCHC 32.5     Lipid Panel:   No results for input(s): CHOL, LDLCALC, HDL, TRIG in the last 168 hours.  Hgb A1C:   No results for input(s): HGBA1C in the last 168 hours.  TSH:   No results for input(s): TSH in the last 168 hours.    Diagnostic Results     Brain Imaging   CT head 11/13/18  Evolving right MCA infarct with 0.2cm MLS  Mass effect upon the right lateral ventricle   Petechial hemorrhage     MRI brain 11/09/18    Large right MCA infarct   Cytotoxic cerebral edema  Petechial hemorrhage inside lesion     Vessel Imaging   Cerebral angiogram 11/09/18  Thromboaspiration of the m1 segment of right middle cerebral artery with TICI 3 reperfusion.      CTA MP 11/09/18  Abrupt distal m1 occlusion.    Cardiac Imaging   Echo 11/10/18  · Left ventricle ejection fraction is normal at 65%  · Normal LV diastolic function.  · RV systolic function is normal.  · Trace tricuspid regurgitation.  · Normal central venous pressure (3 mm Hg).  · No pericardial effusion.

## 2018-11-21 NOTE — PLAN OF CARE
Problem: Stroke (Ischemic) (Adult)  Intervention: Monitor/Assist with Self Care   11/19/18 0900 11/20/18 0803 11/20/18 1498   Activity   Activity Assistance Provided --  --  assistance, 1 person   Daily Care Interventions   Self-Care Promotion --  independence encouraged --    Functional Level Current   Ambulation 3 - assistive equipment and person --  --    Transferring 2 - assistive person --  --    Toileting 2 - assistive person --  --    Bathing 2 - assistive person --  --    Dressing 2 - assistive person --  --    Eating 0 - independent --  --    Communication 0 - understands/communicates without difficulty --  --    Swallowing 0 - swallows foods/liquids without difficulty --  --

## 2018-11-22 VITALS
TEMPERATURE: 99 F | SYSTOLIC BLOOD PRESSURE: 143 MMHG | RESPIRATION RATE: 14 BRPM | HEART RATE: 91 BPM | HEIGHT: 68 IN | OXYGEN SATURATION: 96 % | BODY MASS INDEX: 33.25 KG/M2 | WEIGHT: 219.38 LBS | DIASTOLIC BLOOD PRESSURE: 87 MMHG

## 2018-11-22 PROBLEM — Z92.82 S/P ADMN TPA IN DIFF FAC W/N LAST 24 HR BEF ADM TO CRNT FAC: Status: RESOLVED | Noted: 2018-11-09 | Resolved: 2018-11-22

## 2018-11-22 PROCEDURE — 25000003 PHARM REV CODE 250: Performed by: PHYSICIAN ASSISTANT

## 2018-11-22 PROCEDURE — 63600175 PHARM REV CODE 636 W HCPCS: Performed by: PSYCHIATRY & NEUROLOGY

## 2018-11-22 PROCEDURE — 25000003 PHARM REV CODE 250: Performed by: PSYCHIATRY & NEUROLOGY

## 2018-11-22 PROCEDURE — 25000003 PHARM REV CODE 250: Performed by: STUDENT IN AN ORGANIZED HEALTH CARE EDUCATION/TRAINING PROGRAM

## 2018-11-22 PROCEDURE — 94640 AIRWAY INHALATION TREATMENT: CPT

## 2018-11-22 PROCEDURE — 99233 SBSQ HOSP IP/OBS HIGH 50: CPT | Mod: ,,, | Performed by: PSYCHIATRY & NEUROLOGY

## 2018-11-22 PROCEDURE — 94761 N-INVAS EAR/PLS OXIMETRY MLT: CPT

## 2018-11-22 PROCEDURE — 25000242 PHARM REV CODE 250 ALT 637 W/ HCPCS: Performed by: STUDENT IN AN ORGANIZED HEALTH CARE EDUCATION/TRAINING PROGRAM

## 2018-11-22 RX ORDER — CLOPIDOGREL BISULFATE 75 MG/1
75 TABLET ORAL DAILY
Qty: 30 TABLET | Refills: 11
Start: 2018-11-22 | End: 2019-11-22

## 2018-11-22 RX ORDER — IPRATROPIUM BROMIDE AND ALBUTEROL SULFATE 2.5; .5 MG/3ML; MG/3ML
3 SOLUTION RESPIRATORY (INHALATION)
Qty: 1 BOX | Refills: 0
Start: 2018-11-22 | End: 2019-11-22

## 2018-11-22 RX ORDER — NOREPINEPHRINE BITARTRATE/D5W 4MG/250ML
PLASTIC BAG, INJECTION (ML) INTRAVENOUS
Status: DISCONTINUED
Start: 2018-11-22 | End: 2018-11-22 | Stop reason: HOSPADM

## 2018-11-22 RX ORDER — CLOPIDOGREL BISULFATE 75 MG/1
75 TABLET ORAL DAILY
Status: DISCONTINUED | OUTPATIENT
Start: 2018-11-22 | End: 2018-11-22 | Stop reason: HOSPADM

## 2018-11-22 RX ADMIN — IPRATROPIUM BROMIDE AND ALBUTEROL SULFATE 3 ML: .5; 3 SOLUTION RESPIRATORY (INHALATION) at 07:11

## 2018-11-22 RX ADMIN — HEPARIN SODIUM 5000 UNITS: 5000 INJECTION, SOLUTION INTRAVENOUS; SUBCUTANEOUS at 05:11

## 2018-11-22 RX ADMIN — CLOPIDOGREL 75 MG: 75 TABLET, FILM COATED ORAL at 11:11

## 2018-11-22 RX ADMIN — ASPIRIN 81 MG CHEWABLE TABLET 81 MG: 81 TABLET CHEWABLE at 11:11

## 2018-11-22 RX ADMIN — HYDRALAZINE HYDROCHLORIDE 100 MG: 50 TABLET ORAL at 05:11

## 2018-11-22 RX ADMIN — ATORVASTATIN CALCIUM 80 MG: 20 TABLET, FILM COATED ORAL at 11:11

## 2018-11-22 RX ADMIN — FLUOXETINE 20 MG: 20 CAPSULE ORAL at 11:11

## 2018-11-22 RX ADMIN — AMLODIPINE BESYLATE 10 MG: 10 TABLET ORAL at 11:11

## 2018-11-22 NOTE — PROGRESS NOTES
[facility]  Vascular Neurology  Comprehensive Stroke Center  Progress Note    Assessment/Plan:     * Embolic stroke involving right middle cerebral artery    43 y.o. male with significant past medical history of obesity, HTN, tobacco abuse who was transferred from [facility] after receiving IV-tPA for acute left sided weakness. Found to have R M1 occlusion of the right middle cerebral artery and taken to interventional radiology for thrombectomy. Patient had successful thromboaspiration with TICI 3 reperfusion       MRI brain showed large R MCA infarct with mild petechial hemorrhage.     Etiology arrhythmia provoked by drug abuse vs ESUS. Utox for multiple drugs of abuse including THC with amphetamine (the latter of which can cause abnormal heart rhythms.) Patient does have other risk factors such as family history of early stroke, obesity, tobacco abuse, HTN. Echo with bubble 11/12: negative for intracardiac shunt. 30 day cardiac event monitor ordered for after rehab d/c.    Added Plavix to regimen. Pt stable for d/c to inpatient rehab 11/22/18.      Antithrombotics: asa 81mg qd, Plavix 75mg  Statins for secondary stroke prevention and hyperlipidemia, if present:   Statins: Atorvastatin- 80 mg daily   Aggressive risk factor modification: HTN, Smoking, Obesity, Drug use  Rehab efforts: PT/OT/SLP to evaluate and treat -- Inpatient rehab   Diagnostics ordered/pending: hypercoagulable panel as outpatient  VTE prophylaxis: SCDs. Heparin 5000 units q 8hours   BP parameters: Infarct: Post tPA, SBP <160     Substance abuse    - Positive Utox screen for THC, amphetamine, opioids, benzodiazepines   - Likely contributed to etiology of stroke   -patient does not want to go to substance abuse program     Cytotoxic cerebral edema    - Area of cytotoxic cerebral edema identified when reviewing brain imaging in the territory of the right middle cerebral artery. There is no mass effect  associated with it. We will continue to monitor the patients clinical exam for any worsening of symptoms which may indicate expansion of the stroke or the area of the edema resulting in the clinical change. The pattern is suggestive of possible embolic etiology.     Mixed hyperlipidemia    - Risk factor for stroke  -    - Continue Atorvastatin 80mg QHS     Essential hypertension    - Stroke risk factor.    SBP<160  - Continue current regimen; BP well controlled since adjustments made  PCP follow-up     Dysarthria    - Due to stroke   - SLP eval and treat - Rec rehab     Tobacco abuse    - Stroke risk factor  Encourage cessation for secondary stroke prevention     Acute left-sided weakness    - Sequelae of infarct  - PT, OT ordered; recommending rehab placement  - Continue aggressive daily therapy       Depression    Likely due to stroke  No SI, HI  Fluoxetine started       Angioedema due to angiotensin converting enzyme inhibitor (ACE-I)    - Angioedema on lips and tongue   - Improved with benadryl and steroids   - Recorded in patient's allergies             43 y.o. male with significant past medical history of obesity, HTN, tobacco abuse who was transferred from [facility] after receiving IV-tPA for acute left sided weakness.  Found to have R M1 occlusion of the right middle cerebral artery and taken to interventional radiology for thrombectomy.  Patient had successful thromboaspiration with TICI 3 reperfusion      MRI brain showed large right middle cerebral artery territory infarct.  Mild petechial hemorrhage.  Etiology ESUS.  Utox for multiple drugs of abuse including THC with amphetamine.  Amphetamines can cause abnormal heart rhythms and could be the etiology of stroke.  Patient does have other risk factors such as family history of early stroke, obesity, tobacco abuse, hypertension.  Echo with bubble 11/12: negative for intracardiac shunt.  He was started on ASA 81mg qd and  atorvastatin 80mg qhs for secondary stroke prevention. Patient needs 30 day event monitor to investigate for occult atrial fibrillation and follow up with hypercoagulable panel.  He will follow up with PCP within 10 days of hospital discharge and in 4-6 weeks stroke clinic.      11/12/18 Investigating etiologies of stroke.  Hypercoagulable and echo with bubble ordered. Patient utox + for amphetamines, THC, benzo, opiates.     11/13/18 NAEON patient neurologically stable.  Repeat CT head showing 0.2 cm midline shift and mass effect.  NCC will continue to monitor cerebral edema.  11/14/18 Patient experiencing angioedema from lisinopril.  Will stay in in the ICU for intubation watch.  IV benadryl and solumedrol ordered   11/15/18 angioedema improved with steroids and benadryl  11/16/18. Patient neurologically stable on exam with continued left sided weakness. -191 and HR  in last 24 hour. Discussed with Dr. Dunbar, continue amlodipine 10mg and increase hydralazine to 150mg TID. Plan to increase Lipitor to 80mg daily per Dr. Kapoor since LDL 14.  hypercoagulable panel overall negative, Lipoprotein A elevated at 134. Pending Rehab placement.   11/17/2018 Patient w/ new leukocytosis noted and increased temp and swelling in the LUE; US ordered to evaluate the vasculature. No other complaints at this time. Awaiting placement to Rehab. Starting Fluoxetine 10 mg QD  11/18/2018 No DVT on US. Patient doing well. Shoulder pain relieved w/ Tylenol PRN  11/19/18 Neurologically stable.  Awaiting placement to inpatient rehab. Patient is agreeable to go to substance abuse program after inpatient rehab.    11/20/18 NAEON.  Waiting for placement.  More information given for substance abuse program and does not want to pursue treatment   11/22: Added Plavix to med regimen. Pt d/c'd to inpatient rehab today.    STROKE DOCUMENTATION   Acute Stroke Times   Last Known Normal Date: 11/08/18  Last Known Normal Time: 1930  Symptom  Onset Date: 11/08/18  Symptom Onset Time: 2015  Stroke Team Called Date: 11/08/18  Stroke Team Called Time: 2212  Stroke Team Arrival Date: 11/08/18  Stroke Team Arrival Time: 2213  CT Interpretation Time: 2213(no acute findings)  Decision to Treat Time for Alteplase: 2228(tPA administered)  Decision to Treat Time for IR: 0347(no intervention at this time)    NIH Scale:  1a. Level Of Consciousness: 0-->Alert: keenly responsive  1b. LOC Questions: 0-->Answers both questions correctly  1c. LOC Commands: 0-->Performs both tasks correctly  2. Best Gaze: 0-->Normal  3. Visual: 1-->Partial hemianopia  4. Facial Palsy: 2-->Partial paralysis (total or near-total paralysis of lower face)  5a. Motor Arm, Left: 4-->No movement  5b. Motor Arm, Right: 0-->No drift: limb holds 90 (or 45) degrees for full 10 secs  6a. Motor Leg, Left: 4-->No movement  6b. Motor Leg, Right: 0-->No drift: leg holds 30 degree position for full 5 secs  7. Limb Ataxia: 0-->Absent  8. Sensory: 0-->Normal: no sensory loss  9. Best Language: 0-->No aphasia: normal  10. Dysarthria: 1-->Mild-to-moderate dysarthria: patient slurs at least some words and, at worst, can be understood with some difficulty  11. Extinction and Inattention (formerly Neglect): 0-->No abnormality  Total (NIH Stroke Scale): 12       Modified [place] Score: 4  Genny Coma Scale:    ABCD2 Score:    [id number] Score:   HAS -BLED Score:   ICH Score:   [employer] Classification:      Hemorrhagic change of an Ischemic Stroke: Does this patient have an ischemic stroke with hemorrhagic changes? Yes, Grading Scale: HI Type 1 (HI-1) = small petechiae along the margins of the infarct. Is this a symptomatic change?  No - Hemorrhage is not clinically significant     Neurologic Chief Complaint: right MCA infarct    Subjective:     Interval History: Patient is seen for follow-up neurological assessment and treatment recommendations: MAKENZIE. Added Plavix to med regimen. Pt d/c'd to inpatient rehab  today.    HPI, Past Medical, Family, and Social History remains the same as documented in the initial encounter.     Review of Systems   Constitutional: Negative for chills and fever.   Neurological: Positive for facial asymmetry, speech difficulty and weakness.   Psychiatric/Behavioral: Negative for agitation and behavioral problems.     Scheduled Meds:    Continuous Infusions:  PRN Meds:    Objective:     Vital Signs (Most Recent):  Temp: 98.9 °F (37.2 °C) (11/22/18 0751)  Pulse: 91 (11/22/18 0752)  Resp: 14 (11/22/18 0752)  BP: (!) 143/87 (11/22/18 0751)  SpO2: 96 % (11/22/18 0752)  BP Location: Left arm    Vital Signs Range (Last 24H):  Temp:  [98.2 °F (36.8 °C)-98.9 °F (37.2 °C)]   Pulse:  [81-94]   Resp:  [14-18]   BP: (129-154)/(81-87)   SpO2:  [94 %-98 %]   BP Location: Left arm    Physical Exam   Constitutional: He appears well-developed. No distress.   HENT:   Head: Normocephalic and atraumatic.   Eyes: Conjunctivae are normal. No scleral icterus.   Cardiovascular: Normal rate.   Pulmonary/Chest: Effort normal. No respiratory distress.   Musculoskeletal: He exhibits no edema, tenderness or deformity.   Neurological: He is alert. A cranial nerve deficit is present. He exhibits abnormal muscle tone.   Skin: Skin is warm and dry. He is not diaphoretic.   Psychiatric: He has a normal mood and affect. His behavior is normal.   Vitals reviewed.       Neurological Exam:   LOC: alert  Attention Span: Good   Language: No aphasia  Articulation: mild dysarthria   Orientation: Person, Place, Time   Visual Fields: Hemianopsia left, improving   EOM (CN III, IV, VI): R gaze preference  Motor: Arm left  Plegia 0/5  Leg left  Plegia: 0/5  Arm right  Normal 5/5  Leg right Normal 5/5  Sensation: intact  Tone: flaccid in left upper extremity     Laboratory:  CMP:   No results for input(s): GLUCOSE, CALCIUM, ALBUMIN, PROT, NA, K, CO2, CL, BUN, CREATININE, ALKPHOS, ALT, AST, BILITOT in the last 24 hours.  CBC:   Recent Labs    Lab 11/19/18  0600   WBC 6.40   RBC 5.06   HGB 13.3*   HCT 40.9      MCV 81*   MCH 26.3*   MCHC 32.5     Lipid Panel:   No results for input(s): CHOL, LDLCALC, HDL, TRIG in the last 168 hours.  Hgb A1C:   No results for input(s): HGBA1C in the last 168 hours.  TSH:   No results for input(s): TSH in the last 168 hours.    Diagnostic Results     Brain Imaging     CT head 11/13/18  Evolving right MCA infarct with 0.2cm MLS  Mass effect upon the right lateral ventricle   Petechial hemorrhage     MRI brain 11/09/18    Large right MCA infarct   Cytotoxic cerebral edema  Petechial hemorrhage inside lesion     Vessel Imaging     Cerebral angiogram 11/09/18  Thromboaspiration of the m1 segment of right middle cerebral artery with TICI 3 reperfusion.      CTA MP 11/09/18  Abrupt distal R M1 occlusion.      Cardiac Imaging   Echo 11/10/18  · Left ventricle ejection fraction is normal at 65%  · Normal LV diastolic function.  · RV systolic function is normal.  · Trace tricuspid regurgitation.  · Normal central venous pressure (3 mm Hg).  · No pericardial effusion.      Kalli Merrill PA-C  Comprehensive Stroke Center  Department of Vascular Neurology   [facility]

## 2018-11-22 NOTE — ASSESSMENT & PLAN NOTE
43 y.o. male with significant past medical history of obesity, HTN, tobacco abuse who was [place] after receiving IV-tPA for acute left sided weakness. Found to have R M1 occlusion of the right middle cerebral artery and taken to interventional radiology for thrombectomy. Patient had successful thromboaspiration with TICI 3 reperfusion       MRI brain showed large R MCA infarct with mild petechial hemorrhage.     Etiology arrhythmia provoked by drug abuse vs ESUS. Utox for multiple drugs of abuse including THC with amphetamine (the latter of which can cause abnormal heart rhythms.) Patient does have other risk factors such as family history of early stroke, obesity, tobacco abuse, HTN. Echo with bubble 11/12: negative for intracardiac shunt. 30 day cardiac event monitor ordered for after rehab d/c.    Added Plavix to regimen. Pt stable for d/c to inpatient rehab 11/22/18.      Antithrombotics: asa 81mg qd, Plavix 75mg  Statins for secondary stroke prevention and hyperlipidemia, if present:   Statins: Atorvastatin- 80 mg daily   Aggressive risk factor modification: HTN, Smoking, Obesity, Drug use  Rehab efforts: PT/OT/SLP to evaluate and treat -- Inpatient rehab   Diagnostics ordered/pending: hypercoagulable panel as outpatient  VTE prophylaxis: SCDs. Heparin 5000 units q 8hours   BP parameters: Infarct: Post tPA, SBP <160

## 2018-11-22 NOTE — ASSESSMENT & PLAN NOTE
- Positive Utox screen for THC, amphetamine, opioids, benzodiazepines   - Likely contributed to etiology of stroke   -patient does not want to go to substance abuse program

## 2018-11-22 NOTE — PT/OT/SLP DISCHARGE
Occupational Therapy Discharge Summary    Tyree Pablo  MRN: [id number]   Principal Problem: Embolic stroke involving right middle cerebral artery      Patient Discharged from acute Occupational Therapy on 11/22.  Please refer to prior OT note dated 11/22 for functional status.    Assessment:      Patient appropriate for care in another setting.    Objective:     GOALS:   Multidisciplinary Problems     Occupational Therapy Goals        Problem: Occupational Therapy Goal    Goal Priority Disciplines Outcome Interventions   Occupational Therapy Goal     OT, PT/OT Ongoing (interventions implemented as appropriate)    Description:  Goals set 11/19 to be addressed for 7 days with expiration date, 11/26:  Patient will increase functional independence with ADLs by performing:    Patient will demonstrate rolling to the right with min assist.  Not met  Patient will demonstrate rolling to the left with modified independence.   Not met  Patient will demonstrate supine -sit with SBA.   Not met  Patient will demonstrate stand pivot transfers with min assist.   Not met  Patient will demonstrate grooming while standing with min assist.   Not met  Patient will demonstrate upper body dressing with min assist while seated EOB.   Not met  Patient will demonstrate lower body dressing with mod assist while seated EOB.   Not met  Patient will demonstrate toileting with mod assist.   Not met  Patient's family / caregiver will demonstrate independence and safety with assisting patient with self-care skills and functional mobility.     Not met  Patient and/or patient's family will verbalize understanding of stroke prevention guidelines, personal risk factors and stroke warning signs via teachback method.  Not met                               Reasons for Discontinuation of Therapy Services  Transfer to alternate level of care.      Plan:     Patient Discharged to: Inpatient Rehab    GABRIELLE Bradshaw  11/22/2018

## 2018-11-22 NOTE — ASSESSMENT & PLAN NOTE
- Stroke risk factor.    SBP<160  - Continue current regimen; BP well controlled since adjustments made  PCP follow-up

## 2018-11-22 NOTE — PLAN OF CARE
Covering SW received a call from [facility]roke Team.  Pt. Is medically ready to d/c to [facility].  Transportation was arranged with [facility].  Pts RN Ella provided with contact information to call report [phone] ([place]).  No additional needs identified.    Parker Villa LMSW

## 2018-11-22 NOTE — SUBJECTIVE & OBJECTIVE
Neurologic Chief Complaint: right MCA infarct    Subjective:     Interval History: Patient is seen for follow-up neurological assessment and treatment recommendations: MAKENZIE. Added Plavix to med regimen. Pt d/c'd to inpatient rehab today.    HPI, Past Medical, Family, and Social History remains the same as documented in the initial encounter.     Review of Systems   Constitutional: Negative for chills and fever.   Neurological: Positive for facial asymmetry, speech difficulty and weakness.   Psychiatric/Behavioral: Negative for agitation and behavioral problems.     Scheduled Meds:    Continuous Infusions:  PRN Meds:    Objective:     Vital Signs (Most Recent):  Temp: 98.9 °F (37.2 °C) (11/22/18 0751)  Pulse: 91 (11/22/18 0752)  Resp: 14 (11/22/18 0752)  BP: (!) 143/87 (11/22/18 0751)  SpO2: 96 % (11/22/18 0752)  BP Location: Left arm    Vital Signs Range (Last 24H):  Temp:  [98.2 °F (36.8 °C)-98.9 °F (37.2 °C)]   Pulse:  [81-94]   Resp:  [14-18]   BP: (129-154)/(81-87)   SpO2:  [94 %-98 %]   BP Location: Left arm    Physical Exam   Constitutional: He appears well-developed. No distress.   HENT:   Head: Normocephalic and atraumatic.   Eyes: Conjunctivae are normal. No scleral icterus.   Cardiovascular: Normal rate.   Pulmonary/Chest: Effort normal. No respiratory distress.   Musculoskeletal: He exhibits no edema, tenderness or deformity.   Neurological: He is alert. A cranial nerve deficit is present. He exhibits abnormal muscle tone.   Skin: Skin is warm and dry. He is not diaphoretic.   Psychiatric: He has a normal mood and affect. His behavior is normal.   Vitals reviewed.       Neurological Exam:   LOC: alert  Attention Span: Good   Language: No aphasia  Articulation: mild dysarthria   Orientation: Person, Place, Time   Visual Fields: Hemianopsia left, improving   EOM (CN III, IV, VI): R gaze preference  Motor: Arm left  Plegia 0/5  Leg left  Plegia: 0/5  Arm right  Normal 5/5  Leg right Normal 5/5  Sensation:  intact  Tone: flaccid in left upper extremity     Laboratory:  CMP:   No results for input(s): GLUCOSE, CALCIUM, ALBUMIN, PROT, NA, K, CO2, CL, BUN, CREATININE, ALKPHOS, ALT, AST, BILITOT in the last 24 hours.  CBC:   Recent Labs   Lab 11/19/18  0600   WBC 6.40   RBC 5.06   HGB 13.3*   HCT 40.9      MCV 81*   MCH 26.3*   MCHC 32.5     Lipid Panel:   No results for input(s): CHOL, LDLCALC, HDL, TRIG in the last 168 hours.  Hgb A1C:   No results for input(s): HGBA1C in the last 168 hours.  TSH:   No results for input(s): TSH in the last 168 hours.    Diagnostic Results     Brain Imaging     CT head 11/13/18  Evolving right MCA infarct with 0.2cm MLS  Mass effect upon the right lateral ventricle   Petechial hemorrhage     MRI brain 11/09/18    Large right MCA infarct   Cytotoxic cerebral edema  Petechial hemorrhage inside lesion     Vessel Imaging     Cerebral angiogram 11/09/18  Thromboaspiration of the m1 segment of right middle cerebral artery with TICI 3 reperfusion.      CTA MP 11/09/18  Abrupt distal R M1 occlusion.      Cardiac Imaging   Echo 11/10/18  · Left ventricle ejection fraction is normal at 65%  · Normal LV diastolic function.  · RV systolic function is normal.  · Trace tricuspid regurgitation.  · Normal central venous pressure (3 mm Hg).  · No pericardial effusion.

## 2018-11-22 NOTE — PLAN OF CARE
Problem: Stroke (Ischemic) (Adult)  Intervention: Promote Effective Elimination   11/22/18 0012   Gastrointestinal (GI) Interventions   Bowel Program maintenance program followed   Genitourinary () Interventions   Urinary Elimination Promotion catheter patency maintained   Monitor/Manage Chemotherapy Gastrointestinal Effects   Bowel Dysfunction Management relaxation techniques promoted;hygiene measures promoted

## 2018-11-22 NOTE — ASSESSMENT & PLAN NOTE
- Area of cytotoxic cerebral edema identified when reviewing brain imaging in the territory of the right middle cerebral artery. There is no mass effect associated with it. We will continue to monitor the patients clinical exam for any worsening of symptoms which may indicate expansion of the stroke or the area of the edema resulting in the clinical change. The pattern is suggestive of possible embolic etiology.

## 2018-11-23 NOTE — PT/OT/SLP DISCHARGE
Physical Therapy Discharge Summary    Name: Tyree Pablo  MRN: [id number]   Principal Problem: Embolic stroke involving right middle cerebral artery     Patient Discharged from acute Physical Therapy on 2018.  Please refer to prior PT noted date on 2018 for functional status.     Assessment:     Patient appropriate for care in another setting.    Objective:     GOALS:   Multidisciplinary Problems     Physical Therapy Goals        Problem: Physical Therapy Goal    Goal Priority Disciplines Outcome Goal Variances Interventions   Physical Therapy Goal     PT, PT/OT Ongoing (interventions implemented as appropriate)     Description:  Goals to be met by: 2018     Patient will increase functional independence with mobility by performin. Supine to sit with MInimal Assistance  2. Sit to supine with MInimal Assistance  3. Sit to stand transfer with Minimal Assistance  4. Bed to chair transfer with Minimal Assistance  5. Gait  x 50 feet with Moderate Assistance with or without appropriate AD.   6. Sitting at edge of bed x10 minutes with Contact Guard Assistance  7. Lower extremity exercise program x15 reps per handout, with assistance as needed                      Reasons for Discontinuation of Therapy Services  Transfer to alternate level of care.      Plan:     Patient Discharged to: Inpatient Rehab.    Val Guerra, PT  2018

## 2018-12-10 ENCOUNTER — TELEPHONE (OUTPATIENT)
Dept: NEUROLOGY | Facility: CLINIC | Age: 43
End: 2018-12-10

## 2018-12-10 NOTE — TELEPHONE ENCOUNTER
----- Message from Lottie Arenas RN sent at 11/23/2018  7:55 AM CST -----  Please schedule a neuro followup appt for 4-6 weeks. Patient was inpatient and seen by Dr Springer. Please contact patient with date and time of appointment.

## 2018-12-21 ENCOUNTER — RESEARCH ENCOUNTER (OUTPATIENT)
Dept: RESEARCH | Facility: HOSPITAL | Age: 43
End: 2018-12-21

## 2018-12-24 ENCOUNTER — CLINICAL SUPPORT (OUTPATIENT)
Dept: CARDIOLOGY | Facility: HOSPITAL | Age: 43
End: 2018-12-24
Attending: PHYSICIAN ASSISTANT
Payer: MEDICAID

## 2018-12-24 DIAGNOSIS — R53.1 ACUTE LEFT-SIDED WEAKNESS: ICD-10-CM

## 2018-12-24 DIAGNOSIS — I10 ESSENTIAL HYPERTENSION: ICD-10-CM

## 2018-12-24 DIAGNOSIS — Z92.82 S/P ADMN TPA IN DIFF FAC W/N LAST 24 HR BEF ADM TO CRNT FAC: ICD-10-CM

## 2018-12-24 DIAGNOSIS — G93.6 CYTOTOXIC CEREBRAL EDEMA: ICD-10-CM

## 2018-12-24 DIAGNOSIS — Z72.0 TOBACCO ABUSE: ICD-10-CM

## 2018-12-24 DIAGNOSIS — I63.411 EMBOLIC STROKE INVOLVING RIGHT MIDDLE CEREBRAL ARTERY: ICD-10-CM

## 2018-12-26 ENCOUNTER — OFFICE VISIT (OUTPATIENT)
Dept: NEUROLOGY | Facility: CLINIC | Age: 43
End: 2018-12-26
Payer: MEDICAID

## 2018-12-26 VITALS
HEIGHT: 68 IN | BODY MASS INDEX: 33.25 KG/M2 | SYSTOLIC BLOOD PRESSURE: 144 MMHG | DIASTOLIC BLOOD PRESSURE: 91 MMHG | WEIGHT: 219.38 LBS | HEART RATE: 84 BPM

## 2018-12-26 DIAGNOSIS — I63.411 EMBOLIC STROKE INVOLVING RIGHT MIDDLE CEREBRAL ARTERY: ICD-10-CM

## 2018-12-26 PROCEDURE — 99999 PR PBB SHADOW E&M-EST. PATIENT-LVL II: CPT | Mod: PBBFAC,,, | Performed by: PSYCHIATRY & NEUROLOGY

## 2018-12-26 PROCEDURE — 99212 OFFICE O/P EST SF 10 MIN: CPT | Mod: PBBFAC,PO | Performed by: PSYCHIATRY & NEUROLOGY

## 2018-12-26 PROCEDURE — 99214 OFFICE O/P EST MOD 30 MIN: CPT | Mod: S$PBB,,, | Performed by: PSYCHIATRY & NEUROLOGY

## 2018-12-26 NOTE — ASSESSMENT & PLAN NOTE
Doing better. Still with LUE plegia and LLE paresis but now ambulatory. Continue dual antiplatelets for now, Check platelet response for ASA and Plavix.

## 2018-12-26 NOTE — PROGRESS NOTES
Subjective:       Patient ID: Tyree Pablo is a 43 y.o. male.    Chief Complaint:  Stroke (Hospital follow up)      History of Present Illness  HPI  Stroke Follow-up  He presents for follow-up of a stroke. Event occurred 6 weeks ago. He has residual symptoms of paralysis of left arm(s), left hand(s) or left finger(s), weakness of left face, left upper leg(s) or left lower leg(s), left facial droop. He denies balance disturbance, inability to speak, slurred speech, cognitive impairment, swallowing difficulty, loss of vision bilateral. Overall he feels the condition is improved. Stroke risk factors include hyperlipidemia, hypertension and smoking. He also complains of none. He denies chest pain, palpitations, seizures and shortness of breath.       Review of Systems  Review of Systems   Neurological: Positive for facial asymmetry and weakness.   All other systems reviewed and are negative.      Objective:      Neurologic Exam     Mental Status   Oriented to person, place, and time.   Level of consciousness: alert  Knowledge: good.   Able to name object. Able to read. Able to repeat. Able to write.     Cranial Nerves     CN II   Right visual field deficit: upper nasal and lower nasal quadrant(s)  Left visual field deficit: upper temporal and lower temporal quadrant(s)    CN III, IV, VI   Extraocular motions are normal.     CN VII   Left facial weakness: central    Motor Exam   Muscle bulk: normal  Overall muscle tone: normal    Strength   Strength 5/5 except as noted.   Left deltoid: 0/5  Left biceps: 0/5  Left triceps: 0/5  Left wrist flexion: 0/5  Left wrist extension: 0/5  Left interossei: 0/5  Left iliopsoas: 3/5  Left quadriceps: 3/5  Left hamstring: 3/5  Left glutei: 3/5  Left anterior tibial: 0/5  Left posterior tibial: 0/5  Left peroneal: 0/5  Left gastroc: 3/5    Sensory Exam   Light touch normal.     Gait, Coordination, and Reflexes     Gait  Gait: circumduction    Reflexes   Left brachioradialis: 3+  Left  biceps: 3+  Left triceps: 3+  Left patellar: 3+  Left achilles: 3+  Left plantar: upgoing  Left Nicolas: absent  Left ankle clonus: absent  Left pendular knee jerk: absent      Physical Exam   Constitutional: He is oriented to person, place, and time. He appears well-developed and well-nourished.   HENT:   Head: Normocephalic and atraumatic.   Eyes: EOM are normal.   Cardiovascular: Normal rate and regular rhythm.   Pulmonary/Chest: Effort normal.   Neurological: He is alert and oriented to person, place, and time. A cranial nerve deficit is present.   Reflex Scores:       Tricep reflexes are 3+ on the left side.       Bicep reflexes are 3+ on the left side.       Brachioradialis reflexes are 3+ on the left side.       Patellar reflexes are 3+ on the left side.       Achilles reflexes are 3+ on the left side.  Vitals reviewed.        Assessment:     Problem List Items Addressed This Visit        Neuro    Embolic stroke involving right middle cerebral artery    Overview     S/p tPA given at 2234 - 1630 MRI order   CTA head reveals distal right M1 occlusion   Consult Vascular Neurology  IR following plans for Thrombectomy   BP <140   Q1H Neuro Monitoring   Repeat Imaging to follow   PT/OT/SLP            Current Assessment & Plan     Doing better. Still with LUE plegia and LLE paresis but now ambulatory. Continue dual antiplatelets for now, Check platelet response for ASA and Plavix.         Relevant Orders    Platelet Aggregation, Drug Resistance            Plan:      RTC 3 months

## 2019-02-11 ENCOUNTER — DOCUMENTATION ONLY (OUTPATIENT)
Dept: CARDIOLOGY | Facility: HOSPITAL | Age: 44
End: 2019-02-11

## 2019-02-11 NOTE — PROGRESS NOTES
The above patient cardiac monitor  was cancelled on 1/31 due to monitoring company  not being able to confirm shippping with patient.

## 2019-02-20 NOTE — PROGRESS NOTES
Complete study   PI: Mart Solis MD   : Keiry Kaba         Spoke to patient in regards to 30 day study follow up. Patient is doing better since stroke/procedure, however still requires help with assistance with bodily needs. No AEs to report at this time. Will follow up at 90 days via phone call.    MRS-4

## 2019-02-21 ENCOUNTER — RESEARCH ENCOUNTER (OUTPATIENT)
Dept: RESEARCH | Facility: HOSPITAL | Age: 44
End: 2019-02-21

## 2019-03-14 NOTE — PROGRESS NOTES
Complete study  PI: Marek Solis MD   : Keiry Kaba      Tried to contact Mr. Pablo in regards to 90 day study contact, however was unsuccessful. Called all contacts listed in EMR, and no answer. Mailed out follow up letter, asking patient to contact office.

## 2019-03-18 ENCOUNTER — RESEARCH ENCOUNTER (OUTPATIENT)
Dept: NEUROLOGY | Facility: CLINIC | Age: 44
End: 2019-03-18

## 2019-04-03 NOTE — PROGRESS NOTES
Complete study  PI: Marek Solis MD  : Keiry Kaba  Subject #[id number]  90 day study contact       Called patient regarding 90 day study contact. Per patient he feels his condition has improved since our last communication. He receives outpatient rehab, and still requires some help with day to day activies. No AEs to report.    MRS-3

## 2021-03-24 NOTE — ASSESSMENT & PLAN NOTE
- Stroke risk factor.  SBP<160  - Continue Amlodipine 10mg and Hydralazine 125 mg TID  - BP well controlled since medication adjustments were made   There are no Wet Read(s) to document.

## 2025-02-21 NOTE — PLAN OF CARE
Problem: Patient Care Overview  Goal: Plan of Care Review  Outcome: Ongoing (interventions implemented as appropriate)  Plan of care reviewed with and family at bedside. Assessment performed and V/S taken. Bp elevated 150s/60s; has scheduled hydralazine 100mg and prn labetalol. Patient alert and oriented x4, on tele monitor NSR to tachy in low 100s.Left hemiparesis  And left facial droop noted, left arm elevated on a pillow and left leg with leg boot. 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 PICC line DL to RUE  flushes with + blood return. Emesis x1, patient states he gets nauseous when hot; offered to call MD for orders for antiemetics but patient declined. Bed locked and in low position. Side rails up and locked X2. Call light and personal belongings within reach. Will continue to monitor.        with patient